# Patient Record
Sex: FEMALE | Race: WHITE | NOT HISPANIC OR LATINO | Employment: FULL TIME | URBAN - METROPOLITAN AREA
[De-identification: names, ages, dates, MRNs, and addresses within clinical notes are randomized per-mention and may not be internally consistent; named-entity substitution may affect disease eponyms.]

---

## 2017-07-05 LAB
ALT SERPL-CCNC: 16 U/L (ref 14–59)
AST SERPL-CCNC: 17 U/L (ref 0–45)
CHOLEST SERPL-MCNC: 158 MG/DL
CREAT SERPL-MCNC: 0.8 MG/DL (ref 0.6–1)
GFR SERPL CREATININE-BSD FRML MDRD: >60 ML/MIN/1.73M^2
GLUCOSE SERPL-MCNC: 80 MG/DL (ref 70–124)
HDLC SERPL-MCNC: 68 MG/DL (ref 35–999)
HEP C HIM: NORMAL
HIV 1&2 EXT: NORMAL
LDLC SERPL CALC-MCNC: 76 MG/DL (ref 0–130)
POTASSIUM SERPL-SCNC: 3.9 MMOL/L (ref 3.4–5.3)
TRIGL SERPL-MCNC: 71 MG/DL (ref 20–150)
TSH SERPL-ACNC: 10.39 MLU/L (ref 0.4–4.5)

## 2017-10-03 ENCOUNTER — TRANSFERRED RECORDS (OUTPATIENT)
Dept: HEALTH INFORMATION MANAGEMENT | Facility: CLINIC | Age: 27
End: 2017-10-03

## 2017-10-03 LAB — TSH SERPL-ACNC: 5.07 MLU/L (ref 0.4–4.5)

## 2017-10-14 ENCOUNTER — MEDICAL CORRESPONDENCE (OUTPATIENT)
Dept: HEALTH INFORMATION MANAGEMENT | Facility: CLINIC | Age: 27
End: 2017-10-14

## 2017-10-14 ENCOUNTER — TRANSFERRED RECORDS (OUTPATIENT)
Dept: HEALTH INFORMATION MANAGEMENT | Facility: CLINIC | Age: 27
End: 2017-10-14

## 2017-10-24 ENCOUNTER — TELEPHONE (OUTPATIENT)
Dept: ENDOCRINOLOGY | Facility: CLINIC | Age: 27
End: 2017-10-24

## 2017-10-24 NOTE — TELEPHONE ENCOUNTER
To schedulers: Please schedule her for lst available endocrine    Joana Agustin MD  Endocrine triage

## 2017-10-24 NOTE — TELEPHONE ENCOUNTER
----- Message from Wilber Ruff sent at 10/24/2017 10:49 AM CDT -----  Regarding: Referring to Endocrine from Dr. Britney Le   Pt being referred for possible Hasimoto's / Thyroiditis based on lab results/symptoms.    Thanks,  Wilber    Referral / Discharge Coordinator

## 2017-11-12 ASSESSMENT — ENCOUNTER SYMPTOMS
BLOOD IN STOOL: 0
INSOMNIA: 0
DIARRHEA: 0
NERVOUS/ANXIOUS: 1
SINUS PAIN: 0
ARTHRALGIAS: 0
INCREASED ENERGY: 0
DECREASED CONCENTRATION: 0
SKIN CHANGES: 0
SORE THROAT: 0
HALLUCINATIONS: 0
POOR WOUND HEALING: 0
HOT FLASHES: 0
MUSCLE WEAKNESS: 0
BOWEL INCONTINENCE: 0
VOMITING: 0
NECK MASS: 0
RECTAL PAIN: 0
SMELL DISTURBANCE: 0
HOARSE VOICE: 0
TASTE DISTURBANCE: 0
NECK PAIN: 1
NAUSEA: 0
PANIC: 0
POLYDIPSIA: 0
STIFFNESS: 0
CONSTIPATION: 1
FEVER: 0
BACK PAIN: 1
ALTERED TEMPERATURE REGULATION: 0
SINUS CONGESTION: 0
WEIGHT LOSS: 0
MYALGIAS: 1
FATIGUE: 1
DECREASED LIBIDO: 0
DECREASED APPETITE: 0
MUSCLE CRAMPS: 0
JOINT SWELLING: 0
NAIL CHANGES: 0
CHILLS: 0
BLOATING: 1
POLYPHAGIA: 0
ABDOMINAL PAIN: 1
NIGHT SWEATS: 0
HEARTBURN: 0
JAUNDICE: 0
DEPRESSION: 1
WEIGHT GAIN: 0
TROUBLE SWALLOWING: 0

## 2017-11-15 ENCOUNTER — OFFICE VISIT (OUTPATIENT)
Dept: ENDOCRINOLOGY | Facility: CLINIC | Age: 27
End: 2017-11-15

## 2017-11-15 VITALS
SYSTOLIC BLOOD PRESSURE: 106 MMHG | BODY MASS INDEX: 22.9 KG/M2 | HEIGHT: 61 IN | DIASTOLIC BLOOD PRESSURE: 68 MMHG | WEIGHT: 121.3 LBS | HEART RATE: 63 BPM

## 2017-11-15 DIAGNOSIS — E03.8 SUBCLINICAL HYPOTHYROIDISM: Primary | ICD-10-CM

## 2017-11-15 ASSESSMENT — PAIN SCALES - GENERAL: PAINLEVEL: NO PAIN (0)

## 2017-11-15 NOTE — MR AVS SNAPSHOT
"              After Visit Summary   11/15/2017    Corrina Beasley    MRN: [id number]           Patient Information     Date Of Birth          1990        Visit Information        Provider Department      11/15/2017 7:00 AM Hernesto Ortez MD [facility]        Today's Diagnoses     Subclinical hypothyroidism    -  1       Follow-ups after your visit        Who to contact     Please call your clinic at [phone] to:    Ask questions about your health    Make or cancel appointments    Discuss your medicines    Learn about your test results    Speak to your doctor   If you have compliments or concerns about an experience at your clinic, or if you wish to file a complaint, please contact [facility] Patient Relations at [phone] or email us at [email or web address]         Additional Information About Your Visit        MyChart Information     [employer] gives you secure access to your electronic health record. If you see a primary care provider, you can also send messages to your care team and make appointments. If you have questions, please call your primary care clinic.  If you do not have a primary care provider, please call [phone] and they will assist you.      [employer] is an electronic gateway that provides easy, online access to your medical records. With [employer], you can request a clinic appointment, read your test results, renew a prescription or communicate with your care team.     To access your existing account, please contact your [facility] or call [phone] for assistance.        Care EveryWhere ID     This is your Care EveryWhere ID. This could be used by other organizations to access your [place] medical records  [id number]        Your Vitals Were     Pulse Height BMI (Body Mass Index)             63 1.549 m (5' 1\") 22.92 kg/m2          Blood Pressure from Last 3 Encounters:   11/15/17 106/68    Weight " from Last 3 Encounters:   11/15/17 55 kg (121 lb 4.8 oz)              Today, you had the following     No orders found for display       Primary Care Provider Office Phone # Fax #    [facility] [phone]       [address] [place] [id number]        Equal Access to Services     MISTY SANTOS : Hadii aad ku hadasho Soomaali, waaxda luqadaha, qaybta kaalmada adeegyada, waxmayte pantojain hayaan adeiesha lamb. So [facility] [phone].    ATENCIÓN: Si habla español, tiene a gray disposición servicios gratuitos de asistencia lingüística. Llame al [phone].    We comply with applicable federal civil rights laws and [place] laws. We do not discriminate on the basis of race, color, national origin, age, disability, sex, sexual orientation, or gender identity.            Thank you!     Thank you for choosing [facility]  for your care. Our goal is always to provide you with excellent care. Hearing back from our patients is one way we can continue to improve our services. Please take a few minutes to complete the written survey that you may receive in the mail after your visit with us. Thank you!             Your Updated Medication List - Protect others around you: Learn how to safely use, store and throw away your medicines at www.disposemymeds.org.          This list is accurate as of: 11/15/17  7:37 AM.  Always use your most recent med list.                   Brand Name Dispense Instructions for use Diagnosis    CALCIUM/VITAMIN D3/ADULT GUMMY PO           HM MULTIVITAMIN ADULT GUMMY Chew

## 2017-11-15 NOTE — LETTER
11/15/2017       RE: Corrina Beasley  [address]  [place] [address]     Dear Colleague,    Thank you for referring your patient, Corrina Beasley, to the [facility] ENDOCR[facility] at [facility]. Please see a copy of my visit note below.    Endocrinology Clinic Visit 11/15/2017    NAME:  Corrina Beasley  PCP:  Mimi Bueno Md  MRN:  [id number]  Reason for Consult:  Subclinical hypothyroidism  Requesting Provider:  [facility]    Chief Complaint     Chief Complaint   Patient presents with     Consult     Hashimoto's        History of Present Illness     Corrina Beasley is a 27 year old female who is seen in clinic for management of subclinical hypothyroidism.    Patient had a routine physical examination at [facility] in July 2017. Thyroid labs were checked due to a family history of thyroid disease. Patient was not symptomatic then. Labs showed TSH 10.39, free T4 0.9. Patient was advised to recheck labs in 3 months.      Labs drawn on 10/4/2017 showed:  Thyroid peroxidase antibodies: 448 IU/mL (normal less than 9)  TSH: 5.07 (normal 0.4-4.5)  Free T4: 1.0 ng/dL (normal 0.8-1.8)  CBC, BMP, and cholesterol profile all normal.    Outside records from [facility] were reviewed in the visit today    The patient reports that she has been noticing some hair thinning in the past year or 2. It is mild. She has been slightly more fatigue this past year but she thinks it may be due to increased schoolwork in grad school. She has noticed occasional constipation but only about 25% of the time. Her weight is stable.    The patient denies any slowed thinking, feeling slow, dry skin, feeling down and feeling depressed.    No recent history of febrile illness with neck pain or sore throat.     Localized symptoms: there is no throat swelling, trouble swallowing, no SOB when lying flat, and no voice changes.     Family history of thyroid disease: Yes: one  maternal aunt and a paternal aunt both had Graves disease. Paternal grandmother had hypothyroidism. Possibly another paternal aunt with hypothyroidism. Paternal aunt with lupus (same one who has Graves disease).   Family history of thyroid cancer: No  Personal history of high-dose radiation especially in childhood: No    Problem List     There is no problem list on file for this patient.       Medications     Current Outpatient Prescriptions   Medication     Calcium-Phosphorus-Vitamin D (CALCIUM/VITAMIN D3/ADULT GUMMY PO)     Multiple Vitamins-Minerals (HM MULTIVITAMIN ADULT GUMMY) CHEW     No current facility-administered medications for this visit.         Allergies     No Known Allergies    Medical / Surgical History     No past medical history on file.  No past surgical history on file.    Social History     Social History     Social History     Marital status: Single     Spouse name: N/A     Number of children: N/A     Years of education: N/A     Occupational History     Not on file.     Social History Main Topics     Smoking status: Not on file     Smokeless tobacco: Not on file     Alcohol use Not on file     Drug use: Not on file     Sexual activity: Not on file     Other Topics Concern     Not on file     Social History Narrative       Family History     No family history on file.    ROS     Constitutional: no fevers, chills, night sweats. No weight loss/gain. Mild fatigue. Good appetite  Eyes: no vision changes, no eye redness, no diplopia  Ears, Nose, mouth, throat: no hearing changes, no tinnitus, no rhinorrhea, no nasal congestion, no anosmia  Cardiovascular: no chest pain, no orthopnea or PND, no edema, no palpitations  Respiratory: no dyspnea, no cough, no sputum, no wheezing  Gastrointestinal: no nausea, no vomiting, no abdominal pain, no diarrhea, occasional constipation  Genitourinary: no dysuria, no frequency, no urgency, no nocturia  Musculoskeletal: no joint pains, no back pain, no cramps, no  "fractures  Skin: no rash, no itching, no dryness, no ulcers, no hair loss, no nail changes  Neurological:no headaches, no weakness, no numbness, no tingling, no tremors, no difficulty sleeping, no snoring, no AM sleepiness  Psychiatric: no anxiety, no sadness  Hematologic/lymphatic: no easy bruising, no bleeding, no palor    Physical Exam   /68  Pulse 63  Ht 1.549 m (5' 1\")  Wt 55 kg (121 lb 4.8 oz)  BMI 22.92 kg/m2     General: Comfortable, no obvious distress, normal body habitus  Eyes: Sclera anicteric, moist conjunctiva, no lid lag, no exophthalmos  HENT: Atraumatic, oropharynx clear, moist mucous membranes with no mucosal ulcerations  Neck: Trachea midline, supple. Thyroid: Thyroid is normal in size and texture  CV: Regular rhythm, normal rate. No murmurs auscultated  Resp: Clear to auscultation bilaterally, good effort  Abdomen:  Soft, non tender, non distended. Bowel sounds heard. No organomegaly. No striae  Skin: No rashes, lesions, or subcutaneous nodules.   Psych: Alert and oriented x 3. Appropriate affect, good insight  Extremities: No peripheral edema  Musculoskeletal: Appropriate muscle bulk and strength  Lymphatic: No cervical lymphadenopathy  Neuro: Moves all four extremities. No focal deficits on limited exam. Gait normal. No resting tremor, deep tendon reflexes with normal relaxation phase.     Labs/Imaging     Pertinent Labs were reviewed and updated in [facility].  Radiology Results were  reviewed and updated in EPIC.    I personally reviewed the patient's outside records from [place]. Summary of pertinent findings in [facility].    Impression / Plan     1. Subclinical Hypothyroidism:    We discussed the pathogenesis of hypothyroidism. In most cases it is an autoimmune process that results in lymphocytic thyroiditis, also knows as Hashimoto's thyroiditis, which gradually destroys the gland's ability to produce and secrete thyroid hormone. There is a genetic predisposition in most cases, and possibly " an environmental trigger. There is no treatment that would reverse/stop the autoimmune process. Treatment is aimed at replacing thyroid hormone, with levothyroxine, which is identical to our own T4. The dose is adjusted to target a normal TSH, which is a signal from the pituitary gland.   Patients with hypothyroidism usually have positive thyroid antibodies, namely thyroid peroxidase antibody (anti-TPO) and anti-thyroglobulin antibody.     As far as guidelines for starting treatment, we discussed the current guidelines. I explained to her the pros and cons of starting levothyroxine replacement. Pros including possible symptom relief, but her symptoms are mild, also she has a positive TPO so this may get worse over time. Cons including cost of treatments, and possible lack of any clinical response.     After discussing at length all of the above the patient decided to delay the decision for treatment. This is mostly due to the fact that her TSH changed from 10 to 5 in 3 months, and she would like to check again in 3 months status he where her TSH settles. I did explain to her that it is not very predictable as to the course of Hashimoto's thyroiditis. Her TSH might get worse very quickly or it may just linger in the mildly elevated range for years or any scenario in between.     Plan:  - Will recheck TSH again around January 15, 2018 which is about 3 months from her previous tests  - She will have the lab done at [place] where she had it before. I gave her a lab slip today  - we Will communicate via [employer] after the lab tests are back  - No follow up appointment was made today pending the next set of labs      Hernesto Ortez MD  Endocrinology, Diabetes and Metabolism  [facility]

## 2017-11-15 NOTE — PROGRESS NOTES
Endocrinology Clinic Visit 11/15/2017    NAME:  Corrina Beasley  PCP:  [facility]  MRN:  [id number]  Reason for Consult:  Subclinical hypothyroidism  Requesting Provider:  [facility]    Chief Complaint     Chief Complaint   Patient presents with     Consult     Hashimoto's        History of Present Illness     Corrina Beasley is a 27 year old female who is seen in clinic for management of subclinical hypothyroidism.    Patient had a routine physical examination at [facility] in July 2017. Thyroid labs were checked due to a family history of thyroid disease. Patient was not symptomatic then. Labs showed TSH 10.39, free T4 0.9. Patient was advised to recheck labs in 3 months.      Labs drawn on 10/4/2017 showed:  Thyroid peroxidase antibodies: 448 IU/mL (normal less than 9)  TSH: 5.07 (normal 0.4-4.5)  Free T4: 1.0 ng/dL (normal 0.8-1.8)  CBC, BMP, and cholesterol profile all normal.    Outside records from [facility] were reviewed in the visit today    The patient reports that she has been noticing some hair thinning in the past year or 2. It is mild. She has been slightly more fatigue this past year but she thinks it may be due to increased schoolwork in grad school. She has noticed occasional constipation but only about 25% of the time. Her weight is stable.    The patient denies any slowed thinking, feeling slow, dry skin, feeling down and feeling depressed.    No recent history of febrile illness with neck pain or sore throat.     Localized symptoms: there is no throat swelling, trouble swallowing, no SOB when lying flat, and no voice changes.     Family history of thyroid disease: Yes: one maternal aunt and a paternal aunt both had Graves disease. Paternal grandmother had hypothyroidism. Possibly another paternal aunt with hypothyroidism. Paternal aunt with lupus (same one who has Graves disease).   Family history of thyroid cancer: No  Personal history of high-dose  radiation especially in childhood: No    Problem List     There is no problem list on file for this patient.       Medications     Current Outpatient Prescriptions   Medication     Calcium-Phosphorus-Vitamin D (CALCIUM/VITAMIN D3/ADULT GUMMY PO)     Multiple Vitamins-Minerals (HM MULTIVITAMIN ADULT GUMMY) CHEW     No current facility-administered medications for this visit.         Allergies     No Known Allergies    Medical / Surgical History     No past medical history on file.  No past surgical history on file.    Social History     Social History     Social History     Marital status: Single     Spouse name: N/A     Number of children: N/A     Years of education: N/A     Occupational History     Not on file.     Social History Main Topics     Smoking status: Not on file     Smokeless tobacco: Not on file     Alcohol use Not on file     Drug use: Not on file     Sexual activity: Not on file     Other Topics Concern     Not on file     Social History Narrative       Family History     No family history on file.    ROS     Constitutional: no fevers, chills, night sweats. No weight loss/gain. Mild fatigue. Good appetite  Eyes: no vision changes, no eye redness, no diplopia  Ears, Nose, mouth, throat: no hearing changes, no tinnitus, no rhinorrhea, no nasal congestion, no anosmia  Cardiovascular: no chest pain, no orthopnea or PND, no edema, no palpitations  Respiratory: no dyspnea, no cough, no sputum, no wheezing  Gastrointestinal: no nausea, no vomiting, no abdominal pain, no diarrhea, occasional constipation  Genitourinary: no dysuria, no frequency, no urgency, no nocturia  Musculoskeletal: no joint pains, no back pain, no cramps, no fractures  Skin: no rash, no itching, no dryness, no ulcers, no hair loss, no nail changes  Neurological:no headaches, no weakness, no numbness, no tingling, no tremors, no difficulty sleeping, no snoring, no AM sleepiness  Psychiatric: no anxiety, no sadness  Hematologic/lymphatic:  "no easy bruising, no bleeding, no palor    Physical Exam   /68  Pulse 63  Ht 1.549 m (5' 1\")  Wt 55 kg (121 lb 4.8 oz)  BMI 22.92 kg/m2     General: Comfortable, no obvious distress, normal body habitus  Eyes: Sclera anicteric, moist conjunctiva, no lid lag, no exophthalmos  HENT: Atraumatic, oropharynx clear, moist mucous membranes with no mucosal ulcerations  Neck: Trachea midline, supple. Thyroid: Thyroid is normal in size and texture  CV: Regular rhythm, normal rate. No murmurs auscultated  Resp: Clear to auscultation bilaterally, good effort  Abdomen:  Soft, non tender, non distended. Bowel sounds heard. No organomegaly. No striae  Skin: No rashes, lesions, or subcutaneous nodules.   Psych: Alert and oriented x 3. Appropriate affect, good insight  Extremities: No peripheral edema  Musculoskeletal: Appropriate muscle bulk and strength  Lymphatic: No cervical lymphadenopathy  Neuro: Moves all four extremities. No focal deficits on limited exam. Gait normal. No resting tremor, deep tendon reflexes with normal relaxation phase.     Labs/Imaging     Pertinent Labs were [facility].  Radiology Results were  reviewed and updated in EPIC.    I personally reviewed the patient's outside records from [place]. Summary of pertinent findings in [facility].    Impression / Plan     1. Subclinical Hypothyroidism:    We discussed the pathogenesis of hypothyroidism. In most cases it is an autoimmune process that results in lymphocytic thyroiditis, also knows as Hashimoto's thyroiditis, which gradually destroys the gland's ability to produce and secrete thyroid hormone. There is a genetic predisposition in most cases, and possibly an environmental trigger. There is no treatment that would reverse/stop the autoimmune process. Treatment is aimed at replacing thyroid hormone, with levothyroxine, which is identical to our own T4. The dose is adjusted to target a normal TSH, which is a signal from the pituitary " gland.   Patients with hypothyroidism usually have positive thyroid antibodies, namely thyroid peroxidase antibody (anti-TPO) and anti-thyroglobulin antibody.     As far as guidelines for starting treatment, we discussed the current guidelines. I explained to her the pros and cons of starting levothyroxine replacement. Pros including possible symptom relief, but her symptoms are mild, also she has a positive TPO so this may get worse over time. Cons including cost of treatments, and possible lack of any clinical response.     After discussing at length all of the above the patient decided to delay the decision for treatment. This is mostly due to the fact that her TSH changed from 10 to 5 in 3 months, and she would like to check again in 3 months status he where her TSH settles. I did explain to her that it is not very predictable as to the course of Hashimoto's thyroiditis. Her TSH might get worse very quickly or it may just linger in the mildly elevated range for years or any scenario in between.     Plan:  - Will recheck TSH again around January 15, 2018 which is about 3 months from her previous tests  - She will have the lab done at [place] [facility] where she had it before. I gave her a lab slip today  - we Will communicate via [employer] after the lab tests are back  - No follow up appointment was made today pending the next set of labs      Hernesto Ortez MD  Endocrinology, Diabetes and Metabolism  [facility]

## 2017-11-18 ENCOUNTER — HEALTH MAINTENANCE LETTER (OUTPATIENT)
Age: 27
End: 2017-11-18

## 2018-05-15 ENCOUNTER — MYC MEDICAL ADVICE (OUTPATIENT)
Dept: ENDOCRINOLOGY | Facility: CLINIC | Age: 28
End: 2018-05-15

## 2018-05-15 DIAGNOSIS — E03.9 ACQUIRED HYPOTHYROIDISM: Primary | ICD-10-CM

## 2018-05-18 DIAGNOSIS — E03.8 SUBCLINICAL HYPOTHYROIDISM: ICD-10-CM

## 2018-05-18 LAB
T4 FREE SERPL-MCNC: 0.73 NG/DL (ref 0.76–1.46)
TSH SERPL DL<=0.005 MIU/L-ACNC: 10.15 MU/L (ref 0.4–4)

## 2018-05-21 RX ORDER — LEVOTHYROXINE SODIUM 75 UG/1
75 TABLET ORAL DAILY
Qty: 90 TABLET | Refills: 1 | Status: SHIPPED | OUTPATIENT
Start: 2018-05-21 | End: 2018-07-18

## 2018-07-06 DIAGNOSIS — E03.9 ACQUIRED HYPOTHYROIDISM: ICD-10-CM

## 2018-07-06 LAB — TSH SERPL DL<=0.005 MIU/L-ACNC: 0.97 MU/L (ref 0.4–4)

## 2018-07-18 DIAGNOSIS — E03.9 ACQUIRED HYPOTHYROIDISM: ICD-10-CM

## 2018-07-18 RX ORDER — LEVOTHYROXINE SODIUM 75 UG/1
75 TABLET ORAL DAILY
Qty: 90 TABLET | Refills: 3 | Status: SHIPPED | OUTPATIENT
Start: 2018-07-18 | End: 2019-01-16

## 2018-08-16 ENCOUNTER — TRANSFERRED RECORDS (OUTPATIENT)
Dept: HEALTH INFORMATION MANAGEMENT | Facility: CLINIC | Age: 28
End: 2018-08-16

## 2018-08-20 ENCOUNTER — MYC MEDICAL ADVICE (OUTPATIENT)
Dept: ENDOCRINOLOGY | Facility: CLINIC | Age: 28
End: 2018-08-20

## 2018-08-22 DIAGNOSIS — E03.8 OTHER SPECIFIED HYPOTHYROIDISM: Primary | ICD-10-CM

## 2019-01-16 ENCOUNTER — OFFICE VISIT (OUTPATIENT)
Dept: ENDOCRINOLOGY | Facility: CLINIC | Age: 29
End: 2019-01-16
Payer: COMMERCIAL

## 2019-01-16 VITALS
BODY MASS INDEX: 23.03 KG/M2 | WEIGHT: 122 LBS | DIASTOLIC BLOOD PRESSURE: 73 MMHG | HEIGHT: 61 IN | HEART RATE: 69 BPM | SYSTOLIC BLOOD PRESSURE: 123 MMHG

## 2019-01-16 DIAGNOSIS — E03.8 OTHER SPECIFIED HYPOTHYROIDISM: ICD-10-CM

## 2019-01-16 DIAGNOSIS — E03.9 ACQUIRED HYPOTHYROIDISM: Primary | ICD-10-CM

## 2019-01-16 DIAGNOSIS — E03.9 ACQUIRED HYPOTHYROIDISM: ICD-10-CM

## 2019-01-16 LAB
T4 FREE SERPL-MCNC: NORMAL NG/DL (ref 0.76–1.46)
TSH SERPL DL<=0.005 MIU/L-ACNC: 3.77 MU/L (ref 0.4–4)

## 2019-01-16 ASSESSMENT — MIFFLIN-ST. JEOR: SCORE: 1215.77

## 2019-01-16 ASSESSMENT — PAIN SCALES - GENERAL: PAINLEVEL: NO PAIN (0)

## 2019-01-16 NOTE — PROGRESS NOTES
Endocrinology Clinic Visit 01/16/19  NAME:  Corrina Beasley  PCP:  [facility]  MRN:  [id number]      Chief Complaint     Chief Complaint   Patient presents with     RECHECK     hashimoto's thyroiditis       History of Present Illness     Corrina Beasley is a 27 year old female who is seen in clinic for management of subclinical hypothyroidism.    Patient had a routine physical examination at [facility] in July 2017. Thyroid labs were checked due to a family history of thyroid disease. Patient was not symptomatic then. Labs showed TSH 10.39, free T4 0.9. Patient was advised to recheck labs in 3 months.  Lab recheck in October 2017 showed elevated thyroid peroxidase antibody, with a TSH of 5.07 (normal 0.4-4.5) and a free T4 of 1.0 ng/dL (normal 0.8-1.8).  I saw the patient in November 2017 and she elected to wait and recheck her TSH in a few months.  She had it checked in May 2018 and her TSH was 10.15, with a low free T4 of 0.73.  At that time I advised starting levothyroxine replacement at a dose of 75 mcg daily.  Follow-up lab on 7/6/2018 showed a normal TSH of 0.97.  She was continued on the same dose of levothyroxine, which is the same dose she is still taking right now.  She has not had any repeat thyroid testing.  She reports taking her levothyroxine every morning on an empty stomach half an hour before any other food or drinks.    As far as symptoms, she has noticed that she feels less cold since she started her levothyroxine replacement.  She does report some fatigue, but there has been a lot happening in the past year including starting a new job.  Weight is stable, No change in BMs, No issues with sleep, No tremors, No palpitations, No change in menstrual cycles. It is regular.    No recent history of febrile illness with neck pain or sore throat.     Localized symptoms: there is no throat swelling, trouble swallowing, no SOB when lying flat, and no voice changes.     Family history of  thyroid disease: Yes: one maternal aunt and a paternal aunt both had Graves disease. Paternal grandmother had hypothyroidism. Possibly another paternal aunt with hypothyroidism. Paternal aunt with lupus (same one who has Graves disease).   Family history of thyroid cancer: No  Personal history of high-dose radiation especially in childhood: No    Problem List     Patient Active Problem List   Diagnosis     Acquired hypothyroidism        Medications     Current Outpatient Medications   Medication     Calcium-Phosphorus-Vitamin D (CALCIUM/VITAMIN D3/ADULT GUMMY PO)     levothyroxine (SYNTHROID/LEVOTHROID) 75 MCG tablet     Multiple Vitamins-Minerals (HM MULTIVITAMIN ADULT GUMMY) CHEW     No current facility-administered medications for this visit.         Allergies     No Known Allergies    Medical / Surgical History     No past medical history on file.  No past surgical history on file.    Social History     Social History     Socioeconomic History     Marital status: Single     Spouse name: Not on file     Number of children: Not on file     Years of education: Not on file     Highest education level: Not on file   Social Needs     Financial resource strain: Not on file     Food insecurity - worry: Not on file     Food insecurity - inability: Not on file     Transportation needs - medical: Not on file     Transportation needs - non-medical: Not on file   Occupational History     Not on file   Tobacco Use     Smoking status: Not on file   Substance and Sexual Activity     Alcohol use: Not on file     Drug use: Not on file     Sexual activity: Not on file   Other Topics Concern     Not on file   Social History Narrative     Not on file       Family History     Multiple family members with thyroid disease.    ROS     Constitutional: no fevers, chills, night sweats. Mild fatigue. Good appetite  Eyes: no vision changes, no eye redness, no diplopia  Ears, Nose, mouth, throat: no hearing changes, no tinnitus, no  "rhinorrhea, no nasal congestion  Cardiovascular: no chest pain, no orthopnea or PND, no edema, no palpitations  Respiratory: no dyspnea, no cough, no sputum, no wheezing  Gastrointestinal: no nausea, no vomiting, no abdominal pain, no diarrhea, occasional constipation  Genitourinary: no dysuria, no frequency, no urgency, no nocturia  Musculoskeletal: no joint pains, no back pain, no cramps, no fractures  Skin: no rash, no itching, no dryness, no ulcers, no hair loss, no nail changes  Neurological:no headaches, no weakness, no numbness, no tingling, no tremors, no difficulty sleeping  Psychiatric: no anxiety, no sadness  Hematologic/lymphatic: no easy bruising, no bleeding, no palor    Physical Exam   /73   Pulse 69   Ht 1.549 m (5' 1\")   Wt 55.3 kg (122 lb)   BMI 23.05 kg/m       General: Comfortable, no obvious distress, normal body habitus  Eyes: Sclera anicteric, moist conjunctiva, no lid lag, no exophthalmos  HENT: Atraumatic, oropharynx clear, moist mucous membranes with no mucosal ulcerations  Neck: Trachea midline, supple. Thyroid: Thyroid is normal in size and texture  CV: Regular rhythm, normal rate. No murmurs auscultated  Resp: Clear to auscultation bilaterally, good effort  Abdomen:  Soft, non tender, non distended. Bowel sounds heard. No organomegaly. No striae  Skin: No rashes, lesions, or subcutaneous nodules.   Psych: Alert and oriented x 3. Appropriate affect, good insight  Extremities: No peripheral edema  Musculoskeletal: Appropriate muscle bulk and strength  Lymphatic: No cervical lymphadenopathy  Neuro: Moves all four extremities. No focal deficits on limited exam. Gait normal. No resting tremor, deep tendon reflexes with normal relaxation phase.     Labs/Imaging     Pertinent Labs were reviewed and updated in [facility].  Radiology Results were  reviewed and updated in EPIC.    I personally reviewed the patient's outside records from [place]. Summary of pertinent findings in " HPI.    Impression / Plan     1. Hypothyroidism due to Hashimoto's thyroiditis.:    She was started on thyroid hormone replacement May 2018.    Today we discussed the following:  -The need for annual monitoring of her thyroid status.  TSH checks may need to be done more frequently depending on her results and dose adjustments.  -We will need to get a TSH today, and if it is normal we will recheck again in a year.  If it is abnormal we will adjust the dose and recheck in 2 months.  -The patient was advised to contact us in the next year if she notices any changes in her symptoms that could be suggestive of hyper or hypothyroidism.  At that point we would want to check a TSH.  -Counseling regarding pregnancy planning was given today.  Cervically advised for a TSH goal of 0.4-2.5 before pregnancy and during the first trimester.  I also advised empiric dose increases in levothyroxine by adding an extra pill on Saturdays and Sundays if she finds out she is pregnant.  She is advised to call us as soon as she gets a pregnancy test.  She is not planning a pregnancy anytime soon but counseling was given in any case given that she is in childbearing age.    Otherwise she will follow-up with me annually, considering that she does not have any other primary provider.  She was encouraged to establish with a primary care provider in the future and at that point she does not need to see endocrinology.    Follow up: One year    TIME: I spent a total of 25 minutes face-to-face with Corrina Beasley during today's office visit.  Over 50% of this time was spent counseling the patient and/or coordinating care regarding counseling as per above.  See note for details.    Hernesto Ortez MD  Endocrinology, Diabetes and Metabolism  [facility]

## 2019-01-16 NOTE — LETTER
1/16/2019       RE: Corrina Beasley  [address]  [facility] [address]     Dear Colleague,    Thank you for referring your patient, Corrina Beasley, to the [facility] ENDOCRINOLOGY at [facility]. Please see a copy of my visit note below.    Endocrinology Clinic Visit 01/16/19  NAME:  Corrina Beasley  PCP:  [facility]  MRN:  [id number]      Chief Complaint     Chief Complaint   Patient presents with     RECHECK     hashimoto's thyroiditis       History of Present Illness     Corrina Beasley is a 27 year old female who is seen in clinic for management of subclinical hypothyroidism.    Patient had a routine physical examination at [facility] in July 2017. Thyroid labs were checked due to a family history of thyroid disease. Patient was not symptomatic then. Labs showed TSH 10.39, free T4 0.9. Patient was advised to recheck labs in 3 months.  Lab recheck in October 2017 showed elevated thyroid peroxidase antibody, with a TSH of 5.07 (normal 0.4-4.5) and a free T4 of 1.0 ng/dL (normal 0.8-1.8).  I saw the patient in November 2017 and she elected to wait and recheck her TSH in a few months.  She had it checked in May 2018 and her TSH was 10.15, with a low free T4 of 0.73.  At that time I advised starting levothyroxine replacement at a dose of 75 mcg daily.  Follow-up lab on 7/6/2018 showed a normal TSH of 0.97.  She was continued on the same dose of levothyroxine, which is the same dose she is still taking right now.  She has not had any repeat thyroid testing.  She reports taking her levothyroxine every morning on an empty stomach half an hour before any other food or drinks.    As far as symptoms, she has noticed that she feels less cold since she started her levothyroxine replacement.  She does report some fatigue, but there has been a lot happening in the past year including starting a new job.  Weight is stable, No change in BMs, No issues with sleep, No  tremors, No palpitations, No change in menstrual cycles. It is regular.    No recent history of febrile illness with neck pain or sore throat.     Localized symptoms: there is no throat swelling, trouble swallowing, no SOB when lying flat, and no voice changes.     Family history of thyroid disease: Yes: one maternal aunt and a paternal aunt both had Graves disease. Paternal grandmother had hypothyroidism. Possibly another paternal aunt with hypothyroidism. Paternal aunt with lupus (same one who has Graves disease).   Family history of thyroid cancer: No  Personal history of high-dose radiation especially in childhood: No    Problem List     Patient Active Problem List   Diagnosis     Acquired hypothyroidism        Medications     Current Outpatient Medications   Medication     Calcium-Phosphorus-Vitamin D (CALCIUM/VITAMIN D3/ADULT GUMMY PO)     levothyroxine (SYNTHROID/LEVOTHROID) 75 MCG tablet     Multiple Vitamins-Minerals (HM MULTIVITAMIN ADULT GUMMY) CHEW     No current facility-administered medications for this visit.         Allergies     No Known Allergies    Medical / Surgical History     No past medical history on file.  No past surgical history on file.    Social History     Social History     Socioeconomic History     Marital status: Single     Spouse name: Not on file     Number of children: Not on file     Years of education: Not on file     Highest education level: Not on file   Social Needs     Financial resource strain: Not on file     Food insecurity - worry: Not on file     Food insecurity - inability: Not on file     Transportation needs - medical: Not on file     Transportation needs - non-medical: Not on file   Occupational History     Not on file   Tobacco Use     Smoking status: Not on file   Substance and Sexual Activity     Alcohol use: Not on file     Drug use: Not on file     Sexual activity: Not on file   Other Topics Concern     Not on file   Social History Narrative     Not on file  "      Family History     Multiple family members with thyroid disease.    ROS     Constitutional: no fevers, chills, night sweats. Mild fatigue. Good appetite  Eyes: no vision changes, no eye redness, no diplopia  Ears, Nose, mouth, throat: no hearing changes, no tinnitus, no rhinorrhea, no nasal congestion  Cardiovascular: no chest pain, no orthopnea or PND, no edema, no palpitations  Respiratory: no dyspnea, no cough, no sputum, no wheezing  Gastrointestinal: no nausea, no vomiting, no abdominal pain, no diarrhea, occasional constipation  Genitourinary: no dysuria, no frequency, no urgency, no nocturia  Musculoskeletal: no joint pains, no back pain, no cramps, no fractures  Skin: no rash, no itching, no dryness, no ulcers, no hair loss, no nail changes  Neurological:no headaches, no weakness, no numbness, no tingling, no tremors, no difficulty sleeping  Psychiatric: no anxiety, no sadness  Hematologic/lymphatic: no easy bruising, no bleeding, no palor    Physical Exam   /73   Pulse 69   Ht 1.549 m (5' 1\")   Wt 55.3 kg (122 lb)   BMI 23.05 kg/m        General: Comfortable, no obvious distress, normal body habitus  Eyes: Sclera anicteric, moist conjunctiva, no lid lag, no exophthalmos  HENT: Atraumatic, oropharynx clear, moist mucous membranes with no mucosal ulcerations  Neck: Trachea midline, supple. Thyroid: Thyroid is normal in size and texture  CV: Regular rhythm, normal rate. No murmurs auscultated  Resp: Clear to auscultation bilaterally, good effort  Abdomen:  Soft, non tender, non distended. Bowel sounds heard. No organomegaly. No striae  Skin: No rashes, lesions, or subcutaneous nodules.   Psych: Alert and oriented x 3. Appropriate affect, good insight  Extremities: No peripheral edema  Musculoskeletal: Appropriate muscle bulk and strength  Lymphatic: No cervical lymphadenopathy  Neuro: Moves all four extremities. No focal deficits on limited exam. Gait normal. No resting tremor, deep tendon " reflexes with normal relaxation phase.     Labs/Imaging     Pertinent Labs were reviewed and updated in [facility].  Radiology Results were  reviewed and updated in EPIC.    I personally reviewed the patient's outside records from [place]. Summary of pertinent findings in [facility].    Impression / Plan     1. Hypothyroidism due to Hashimoto's thyroiditis.:    She was started on thyroid hormone replacement May 2018.    Today we discussed the following:  -The need for annual monitoring of her thyroid status.  TSH checks may need to be done more frequently depending on her results and dose adjustments.  -We will need to get a TSH today, and if it is normal we will recheck again in a year.  If it is abnormal we will adjust the dose and recheck in 2 months.  -The patient was advised to contact us in the next year if she notices any changes in her symptoms that could be suggestive of hyper or hypothyroidism.  At that point we would want to check a TSH.  -Counseling regarding pregnancy planning was given today.  Cervically advised for a TSH goal of 0.4-2.5 before pregnancy and during the first trimester.  I also advised empiric dose increases in levothyroxine by adding an extra pill on Saturdays and Sundays if she finds out she is pregnant.  She is advised to call us as soon as she gets a pregnancy test.  She is not planning a pregnancy anytime soon but counseling was given in any case given that she is in childbearing age.    Otherwise she will follow-up with me annually, considering that she does not have any other primary provider.  She was encouraged to establish with a primary care provider in the future and at that point she does not need to see endocrinology.    Follow up: One year    TIME: I spent a total of 25 minutes face-to-face with Corrina Beasley during today's office visit.  Over 50% of this time was spent counseling the patient and/or coordinating care regarding counseling as per above.  See note for  details.      Hernesto Ortez MD

## 2019-01-16 NOTE — NURSING NOTE
Chief Complaint   Patient presents with     RECHECK     hashimoto's thyroiditis     Maggie Alberto CMA

## 2019-07-16 DIAGNOSIS — E03.9 ACQUIRED HYPOTHYROIDISM: ICD-10-CM

## 2019-07-16 LAB — TSH SERPL DL<=0.005 MIU/L-ACNC: 1.53 MU/L (ref 0.4–4)

## 2019-07-17 ENCOUNTER — MYC MEDICAL ADVICE (OUTPATIENT)
Dept: ENDOCRINOLOGY | Facility: CLINIC | Age: 29
End: 2019-07-17

## 2019-10-25 DIAGNOSIS — E03.9 ACQUIRED HYPOTHYROIDISM: ICD-10-CM

## 2019-10-26 RX ORDER — LEVOTHYROXINE SODIUM 88 UG/1
88 TABLET ORAL DAILY
Qty: 90 TABLET | Refills: 3 | OUTPATIENT
Start: 2019-10-26

## 2019-11-03 ENCOUNTER — HEALTH MAINTENANCE LETTER (OUTPATIENT)
Age: 29
End: 2019-11-03

## 2019-12-12 ENCOUNTER — OFFICE VISIT (OUTPATIENT)
Dept: OBGYN | Facility: CLINIC | Age: 29
End: 2019-12-12
Payer: COMMERCIAL

## 2019-12-12 VITALS
DIASTOLIC BLOOD PRESSURE: 65 MMHG | SYSTOLIC BLOOD PRESSURE: 112 MMHG | WEIGHT: 123 LBS | OXYGEN SATURATION: 95 % | HEART RATE: 71 BPM | BODY MASS INDEX: 23.24 KG/M2

## 2019-12-12 DIAGNOSIS — Z12.4 SCREENING FOR MALIGNANT NEOPLASM OF CERVIX: ICD-10-CM

## 2019-12-12 DIAGNOSIS — Z11.3 SCREENING EXAMINATION FOR SEXUALLY TRANSMITTED DISEASE: ICD-10-CM

## 2019-12-12 DIAGNOSIS — Z11.3 SCREEN FOR STD (SEXUALLY TRANSMITTED DISEASE): ICD-10-CM

## 2019-12-12 DIAGNOSIS — B37.31 YEAST INFECTION OF THE VAGINA: ICD-10-CM

## 2019-12-12 DIAGNOSIS — N89.8 VAGINAL ITCHING: Primary | ICD-10-CM

## 2019-12-12 LAB
SPECIMEN SOURCE: ABNORMAL
WET PREP SPEC: ABNORMAL

## 2019-12-12 PROCEDURE — 87491 CHLMYD TRACH DNA AMP PROBE: CPT | Performed by: OBSTETRICS & GYNECOLOGY

## 2019-12-12 PROCEDURE — 99203 OFFICE O/P NEW LOW 30 MIN: CPT | Performed by: OBSTETRICS & GYNECOLOGY

## 2019-12-12 PROCEDURE — 87210 SMEAR WET MOUNT SALINE/INK: CPT | Performed by: OBSTETRICS & GYNECOLOGY

## 2019-12-12 PROCEDURE — 87624 HPV HI-RISK TYP POOLED RSLT: CPT | Performed by: OBSTETRICS & GYNECOLOGY

## 2019-12-12 PROCEDURE — 87591 N.GONORRHOEAE DNA AMP PROB: CPT | Performed by: OBSTETRICS & GYNECOLOGY

## 2019-12-12 PROCEDURE — G0145 SCR C/V CYTO,THINLAYER,RESCR: HCPCS | Performed by: OBSTETRICS & GYNECOLOGY

## 2019-12-12 RX ORDER — FLUCONAZOLE 150 MG/1
150 TABLET ORAL ONCE
Qty: 1 TABLET | Refills: 0 | Status: SHIPPED | OUTPATIENT
Start: 2019-12-12 | End: 2019-12-17

## 2019-12-12 NOTE — PROGRESS NOTES
SUBJECTIVE:  Corrina Beasley is a 29 year old female who presents to evaluate vulvar itching.  She is seen no abnormal discharge, but over the past week has had vulvar itching, vulva is sensitive to touch.  She had a recent menstrual period, the symptoms seem to be decreasing.  She has a history of an abdominal myomectomy 4 years ago, had been experiencing pelvic pain prior to the procedure, still has some degree of pain but feels she can cope with it.  She used a ParaGard for 5 years, now is not using contraception.  She requests STD testing as ordered.    Past medical, surgical, family, and social history have been noted.     ROS:  See above re GI/.  No fevers, chills.     OBJECTIVE: /65   Pulse 71   Wt 55.8 kg (123 lb)   SpO2 95%   BMI 23.24 kg/m       Pelvic exam:  External genitalia appeared normal without lesion.  Urethral meatus, urethra, bladder, perineum, and anus appeared normal. Cervix and vagina appeared normal, without lesion.  Bimanual exam revealed a normal sized non-tender uterus, with no adnexal masses.  Rectovaginal deferred.       Results for orders placed or performed in visit on 12/12/19   Wet prep     Status: Abnormal   Result Value Ref Range    Specimen Description Vagina     Wet Prep No Trichomonas seen     Wet Prep No clue cells seen     Wet Prep Few  Yeast seen   (A)     Wet Prep Few  WBC'S seen         GC/CT pending.  Pap pending.     ASSESSMENT: Vulvar pruritus, improving.  Yeast seen on wet prep.    PLAN: Will treat with Diflucan x 1.  RTC if symptoms recur.

## 2019-12-13 LAB
C TRACH DNA SPEC QL NAA+PROBE: NEGATIVE
N GONORRHOEA DNA SPEC QL NAA+PROBE: NEGATIVE
SPECIMEN SOURCE: NORMAL
SPECIMEN SOURCE: NORMAL

## 2019-12-16 LAB
COPATH REPORT: NORMAL
PAP: NORMAL

## 2019-12-17 ENCOUNTER — ANCILLARY PROCEDURE (OUTPATIENT)
Dept: GENERAL RADIOLOGY | Facility: CLINIC | Age: 29
End: 2019-12-17
Attending: NURSE PRACTITIONER
Payer: OTHER MISCELLANEOUS

## 2019-12-17 ENCOUNTER — OFFICE VISIT (OUTPATIENT)
Dept: FAMILY MEDICINE | Facility: CLINIC | Age: 29
End: 2019-12-17
Payer: OTHER MISCELLANEOUS

## 2019-12-17 VITALS
TEMPERATURE: 98.4 F | HEIGHT: 61 IN | WEIGHT: 124 LBS | RESPIRATION RATE: 18 BRPM | BODY MASS INDEX: 23.41 KG/M2 | OXYGEN SATURATION: 98 % | SYSTOLIC BLOOD PRESSURE: 117 MMHG | HEART RATE: 57 BPM | DIASTOLIC BLOOD PRESSURE: 74 MMHG

## 2019-12-17 DIAGNOSIS — T36.95XA ANTIBIOTIC-INDUCED YEAST INFECTION: ICD-10-CM

## 2019-12-17 DIAGNOSIS — S67.196A CRUSHING INJURY OF RIGHT LITTLE FINGER, INITIAL ENCOUNTER: ICD-10-CM

## 2019-12-17 DIAGNOSIS — Z02.6 ENCOUNTER RELATED TO WORKER'S COMPENSATION CLAIM: ICD-10-CM

## 2019-12-17 DIAGNOSIS — S67.196A CRUSHING INJURY OF RIGHT LITTLE FINGER, INITIAL ENCOUNTER: Primary | ICD-10-CM

## 2019-12-17 DIAGNOSIS — B37.9 ANTIBIOTIC-INDUCED YEAST INFECTION: ICD-10-CM

## 2019-12-17 PROCEDURE — 73140 X-RAY EXAM OF FINGER(S): CPT | Mod: RT

## 2019-12-17 PROCEDURE — 99203 OFFICE O/P NEW LOW 30 MIN: CPT | Performed by: NURSE PRACTITIONER

## 2019-12-17 RX ORDER — CEPHALEXIN 500 MG/1
500 CAPSULE ORAL 2 TIMES DAILY
Qty: 10 CAPSULE | Refills: 0 | Status: SHIPPED | OUTPATIENT
Start: 2019-12-17 | End: 2019-12-23

## 2019-12-17 RX ORDER — FLUCONAZOLE 150 MG/1
150 TABLET ORAL ONCE
Qty: 1 TABLET | Refills: 0 | Status: SHIPPED | OUTPATIENT
Start: 2019-12-17 | End: 2019-12-23

## 2019-12-17 ASSESSMENT — MIFFLIN-ST. JEOR: SCORE: 1219.84

## 2019-12-17 ASSESSMENT — PAIN SCALES - GENERAL: PAINLEVEL: MODERATE PAIN (4)

## 2019-12-17 NOTE — PROGRESS NOTES
"Subjective     Corrina Beasley is a 29 year old female who presents to clinic today for the following health issues:    HPI   Work Comp    Onset: Today 12/17/19 about 8:40am    Description:   Location: Right pinky finger  Character: throbbing    Intensity: mild    Progression of Symptoms: improving       Accompanying Signs & Symptoms:  Other symptoms: radiation of pain to right hand, warm    History:   Previous similar pain: no       Precipitating factors:   Trauma or overuse: YES- Right pinky finger slammed between window when trying to close window, window came down and shut on finger    Alleviating factors:  Improved by: Ibuprofen    Therapies Tried and outcome: Ice right after injury, Ibuprofen      Pleasant 29 year old female presents with crush injury to right little finger that occurred this morning. She works in a [employer] and was in a patient room. The window was open and she went to close it and it slammed shut on her finger. She is able to move it but it's quite painful. Left hand dominant. Not pregnant or breastfeeding.        Reviewed and updated as needed this visit by Provider         Review of Systems   ROS COMP: Constitutional, HEENT, cardiovascular, pulmonary, gi and gu systems are negative, except as otherwise noted.      Objective    /74 (BP Location: Right arm, Patient Position: Chair, Cuff Size: Adult Regular)   Pulse 57   Temp 98.4  F (36.9  C) (Oral)   Resp 18   Ht 1.549 m (5' 1\")   Wt 56.2 kg (124 lb)   LMP 12/04/2019 (Exact Date)   SpO2 98%   BMI 23.43 kg/m    Body mass index is 23.43 kg/m .  Physical Exam   GENERAL: healthy, alert and no distress  MS: left little finger with tenderness from DIP to tip of finger  SKIN: minor abrasion with dried blood to left little finger. No erythema or ecchymosis  PSYCH: mentation appears normal, affect normal/bright    Diagnostic Test Results:  Xray -     RIGHT FINGER TWO OR MORE VIEWS  12/17/2019 1:43 PM      HISTORY: Crush injury this morning - " window slammed on finger.  Crushing injury of right little finger, initial encounter.     COMPARISON: None.                                                                      IMPRESSION: Small ossicle adjacent to the tuft of the distal phalanx  of the small finger. This could be a subtle fracture. Otherwise  unremarkable.     ANTONIO BUTCHER MD        Assessment & Plan       ICD-10-CM    1. Crushing injury of right little finger, initial encounter S67.196A XR Finger Right G/E 2 Views     cephALEXin (KEFLEX) 500 MG capsule   2. Antibiotic-induced yeast infection B37.9 fluconazole (DIFLUCAN) 150 MG tablet    T36.95XA    3. Encounter related to worker's compensation claim [email or web address]    Wound is superficial and doesn't require closure   X-ray as above - possible fracture. Splinted with frog with tip of finger in slight extension and used papertape to secure.  Will give keflex for possible open fracture  Follow up in 1 week  Workability letter given         See Patient Instructions    Ice 15 minutes 4 times daily  Elevate while resting  Splint x2-4 weeks  Tylenol 650-1000 mg every 6 hours as needed for pain  Cephalexin 1 tab twice daily x5 days for prophylaxis   Follow up with primary care provider in 1 week    Return in about 1 week (around 12/24/2019).    ESSENCE Jansen [facility]

## 2019-12-17 NOTE — LETTER
REPORT OF WORK COMP    [address] [address]  [phone]      PATIENT DATA    Employee Name: Corrina Beasley      : 1990     #: [id number]    Work related injury: Yes  Employer at time of injury:  [employer]  Employer contact & phone:    Employed elsewhere? No  Workers' Compensation Carrier/Managed Care Plan:       Today's date: 2019  Date of injury: 2019  Date of first visit: 2019    PROVIDER EVALUATION: Please fill in as needed.  Please give copy to employee for employer.    1. Diagnosis: crush injury of right little finger, open fracture    2. Treatment: splint, rest, ice.  3. Medication: tylenol, keflex  NOTE: When ordering a medication, [place] Rules require Work Comp or WC on prescriptions.    4. No work for the rest of the day today.  5. Return to work date: 2019   ** WITH RESTRICTIONS? Yes, with work restrictions: left hand work with right hand assist. She is left handed.  DURATION OF LIMITATIONS: 1 week      RESTRICTIONS: Unlimited unless listed.  Restrictions apply to home and leisure also.  If work restrictions is not available, the employee is totally disabled.    Maximum Medical Improvement (Date): 2020  Any Permanent Partial Disability? Deferred to future exam/consult.    Provider comments: as above    Medical Examiner: ESSENCE Jansen CNP           License or registration: [id number]    Next appointment: 1 week    CC: Employer, Managed Care Plan/Payor, Patient

## 2019-12-17 NOTE — PATIENT INSTRUCTIONS
Ice 15 minutes 4 times daily  Elevate while resting  Splint x2-4 weeks  Tylenol 650-1000 mg every 6 hours as needed for pain  Cephalexin 1 tab twice daily x5 days for prophylaxis   Follow up with primary care provider in 1 week  Patient Education     Finger Fracture, Open  You have a broken finger (fracture) with a nearby cut, puncture, or deep scrape. This causes local pain, swelling, and bruising. Because of the open injury, you are at risk for infection in the skin and bone. You will take antibiotics to lower the risk for infection.   This injury usually takes about 4 weeks to heal. Finger injuries are often treated with a splint or cast, or by taping the injured finger to the next one (buddy taping). This protects the injured finger and holds the bone in position while it heals. More serious fractures may need surgery.  If the fingernail has been severely injured, it will probably fall off in 1 to 2 weeks. A new fingernail will usually start to grow back within a month.  Home care  Follow these guidelines when caring for yourself at home:    Keep your hand elevated to reduce pain and swelling. When sitting or lying down keep your arm above the level of your heart. You can do this by placing your arm on a pillow that rests on your chest or on a pillow at your side. This is most important during the first 2 days (48 hours) after the injury.    Put an ice pack on the injured area. Do this for 20 minutes every 1 to 2 hours the first day for pain relief. You can make an ice pack by wrapping a plastic bag of ice cubes in a thin towel. As the ice melts, be careful that the cast or splint doesn t get wet. Continue using the ice pack 3 to 4 times a day until the pain and swelling go away.    Keep the cast or splint completely dry at all times. Bathe with your cast or splint out of the water. Protect it with a large plastic bag, rubber-banded at the top end. If a fiberglass cast or splint gets wet, you can dry it with a  hair dryer.    You may use acetaminophen or ibuprofen to control pain, unless another pain medicine was prescribed. If you have chronic liver or kidney disease, talk with your healthcare provider before using these medicines. Also talk with your provider if you ve had a stomach ulcer or gastrointestinal bleeding.    If charlie tape was applied and it becomes wet or dirty, change it. You may replace it with paper, plastic, or cloth tape. Cloth tape and paper tapes must be kept dry. Keep the charlie tape in place for at least 4 weeks.    Take all antibiotics until you have finished them.    Don t put creams or objects under the cast if you have itching.  Follow-up care  Follow up with your healthcare provider, or as advised. This is to make sure the bone is healing the way it should.  X-rays may be taken. You will be told of any new findings that may affect your care.  When to seek medical advice  Call your healthcare provider right away if any of these occur:    The cast or splint cracks    The plaster cast or splint becomes wet or soft    The fiberglass cast or splint stays wet for more than 24 hours    Pain or swelling gets worse    Tightness or pressure under the cast or splint gets worse    Finger becomes cold, blue, numb, or tingly    You can t move your finger    Redness, warmth, swelling, drainage from the wound, or foul odor from a cast or splint    Fever of 100.4 F (38 C) or higher, or as directed by your healthcare provider   Date Last Reviewed: 2/1/2017 2000-2018 [employer]. [address] [place] [address], [place] [address]. All rights reserved. This information is not intended as a substitute for professional medical care. Always follow your healthcare professional's instructions.

## 2019-12-18 LAB
FINAL DIAGNOSIS: NORMAL
HPV HR 12 DNA CVX QL NAA+PROBE: NEGATIVE
HPV16 DNA SPEC QL NAA+PROBE: NEGATIVE
HPV18 DNA SPEC QL NAA+PROBE: NEGATIVE
SPECIMEN DESCRIPTION: NORMAL
SPECIMEN SOURCE CVX/VAG CYTO: NORMAL

## 2019-12-23 ENCOUNTER — OFFICE VISIT (OUTPATIENT)
Dept: FAMILY MEDICINE | Facility: CLINIC | Age: 29
End: 2019-12-23
Payer: OTHER MISCELLANEOUS

## 2019-12-23 VITALS
DIASTOLIC BLOOD PRESSURE: 60 MMHG | RESPIRATION RATE: 14 BRPM | HEIGHT: 61 IN | TEMPERATURE: 97.9 F | HEART RATE: 65 BPM | WEIGHT: 127 LBS | OXYGEN SATURATION: 99 % | SYSTOLIC BLOOD PRESSURE: 96 MMHG | BODY MASS INDEX: 23.98 KG/M2

## 2019-12-23 DIAGNOSIS — S67.196D CRUSHING INJURY OF RIGHT LITTLE FINGER, SUBSEQUENT ENCOUNTER: Primary | ICD-10-CM

## 2019-12-23 DIAGNOSIS — Z02.6 ENCOUNTER RELATED TO WORKER'S COMPENSATION CLAIM: ICD-10-CM

## 2019-12-23 PROCEDURE — 99213 OFFICE O/P EST LOW 20 MIN: CPT | Performed by: NURSE PRACTITIONER

## 2019-12-23 ASSESSMENT — MIFFLIN-ST. JEOR: SCORE: 1233.45

## 2019-12-23 NOTE — PATIENT INSTRUCTIONS
Ice 15 minutes 4 times daily  Elevate while resting  Splint x2-4 weeks  Tylenol 650-1000 mg every 6 hours as needed for pain  Follow up with orthopedics

## 2019-12-31 ENCOUNTER — OFFICE VISIT (OUTPATIENT)
Dept: ORTHOPEDICS | Facility: CLINIC | Age: 29
End: 2019-12-31
Payer: OTHER MISCELLANEOUS

## 2019-12-31 VITALS
HEIGHT: 62 IN | BODY MASS INDEX: 23 KG/M2 | RESPIRATION RATE: 16 BRPM | HEART RATE: 64 BPM | DIASTOLIC BLOOD PRESSURE: 69 MMHG | SYSTOLIC BLOOD PRESSURE: 127 MMHG | WEIGHT: 125 LBS

## 2019-12-31 DIAGNOSIS — S62.666A CLOSED NONDISPLACED FRACTURE OF DISTAL PHALANX OF RIGHT LITTLE FINGER, INITIAL ENCOUNTER: Primary | ICD-10-CM

## 2019-12-31 PROCEDURE — 99243 OFF/OP CNSLTJ NEW/EST LOW 30: CPT | Performed by: ORTHOPAEDIC SURGERY

## 2019-12-31 ASSESSMENT — MIFFLIN-ST. JEOR: SCORE: 1232.31

## 2019-12-31 NOTE — PROGRESS NOTES
Corrina Beasley is a 29 year old female who is seen in consultation at the request of Sonya Mckenzie CNP  for right small finger crush injury 12/17/19.  A window slammed down on her small finger.  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  She had severe pain in the finger initially.  It is improving.  She is used ice and ibuprofen.  She has a AlumaFoam splint but it is uncomfortable for her.    X-ray is reviewed with her showing minimally displaced fracture of the distal phalanx right small finger.    History reviewed. No pertinent past medical history.    History reviewed. No pertinent surgical history.    History reviewed. No pertinent family history.    Social History     Socioeconomic History     Marital status: Single     Spouse name: Not on file     Number of children: Not on file     Years of education: Not on file     Highest education level: Not on file   Occupational History     Not on file   Social Needs     Financial resource strain: Not on file     Food insecurity:     Worry: Not on file     Inability: Not on file     Transportation needs:     Medical: Not on file     Non-medical: Not on file   Tobacco Use     Smoking status: Never Smoker     Smokeless tobacco: Never Used   Substance and Sexual Activity     Alcohol use: Not Currently     Drug use: Never     Sexual activity: Yes     Partners: Male     Birth control/protection: None   Lifestyle     Physical activity:     Days per week: Not on file     Minutes per session: Not on file     Stress: Not on file   Relationships     Social connections:     Talks on phone: Not on file     Gets together: Not on file     Attends Holiness service: Not on file     Active member of club or organization: Not on file     Attends meetings of clubs or organizations: Not on file     Relationship status: Not on file     Intimate partner violence:     Fear of  "current or ex partner: Not on file     Emotionally abused: Not on file     Physically abused: Not on file     Forced sexual activity: Not on file   Other Topics Concern     Not on file   Social History Narrative     Not on file       Current Outpatient Medications   Medication Sig Dispense Refill     Calcium-Phosphorus-Vitamin D (CALCIUM/VITAMIN D3/ADULT GUMMY PO)        levothyroxine (SYNTHROID/LEVOTHROID) 88 MCG tablet Take 1 tablet (88 mcg) by mouth daily 90 tablet 3     Multiple Vitamins-Minerals (HM MULTIVITAMIN ADULT GUMMY) CHEW          Allergies   Allergen Reactions     Seasonal Allergies        REVIEW OF SYSTEMS:  CONSTITUTIONAL:  NEGATIVE for fever, chills, change in weight, not feeling tired  SKIN:  NEGATIVE for worrisome rashes, no skin lumps, no skin ulcers and no non-healing wounds  EYES:  NEGATIVE for vision changes or irritation.  ENT/MOUTH:  NEGATIVE.  No hearing loss, no hoarseness, no difficulty swallowing.  RESP:  NEGATIVE. No cough or shortness of breath.  CV:  NEGATIVE for chest pain, palpitations or peripheral edema  GI:  NEGATIVE for nausea, abdominal pain, heartburn, or change in bowel habits  :  Negative. No dysuria, no hematuria  MUSCULOSKELETAL:  See HPI above  NEURO:  NEGATIVE . No headaches, no dizziness,  no numbness  ENDOCRINE:  NEGATIVE for temperature intolerance, skin/hair changes  HEME/ALLERGY/IMMUNE:  NEGATIVE for bleeding problems  PSYCHIATRIC:  NEGATIVE. no anxiety, no depression.     Exam:  Vitals: /69   Pulse 64   Resp 16   Ht 1.562 m (5' 1.5\")   Wt 56.7 kg (125 lb)   LMP 12/04/2019 (Exact Date)   BMI 23.24 kg/m    BMI= Body mass index is 23.24 kg/m .  Constitutional:  healthy, alert and no distress  Neuro: Alert and Oriented x 3, Sensation grossly WNL.  Psych: Affect normal   Respiratory: Breathing not labored.  Cardiovascular: normal peripheral pulses  Lymph: no adenopathy  Skin: No rashes,worrisome lesions or skin problems  She has some bruising of the right " small fingernail.  She has 2 sites of abrasion on the fingertip.  She has normal sensation to the fingertip.  The finger is quite sensitive to touch.  She does have full range of motion of the finger.  No warmth or erythema.  No drainage.    Assessment: Right small finger tip fracture.  There is no evidence of nailbed injury.  There is no infection.    Plan: This needs symptomatic treatment only.  I have fitted her with a stack splint, which is more comfortable for her.  She will use this as long as it feels like the fingertip needs to be protected.  This will probably take another month.  She does not need follow-up x-ray.  There will be no permanent impairment from this injury.

## 2019-12-31 NOTE — LETTER
12/31/2019         RE: Corrina Beasley  [address]  [facility] [address]        Dear Colleague,    Thank you for referring your patient, Corrina Beasley, to the [facility]. Please see a copy of my visit note below.    Corrina Beasley is a 29 year old female who is seen in consultation at the request of Sonya Mckenzie CNP  for right small finger crush injury 12/17/19.  A window slammed down on her small finger.  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  She had severe pain in the finger initially.  It is improving.  She is used ice and ibuprofen.  She has a AlumaFoam splint but it is uncomfortable for her.    X-ray is reviewed with her showing minimally displaced fracture of the distal phalanx right small finger.    History reviewed. No pertinent past medical history.    History reviewed. No pertinent surgical history.    History reviewed. No pertinent family history.    Social History     Socioeconomic History     Marital status: Single     Spouse name: Not on file     Number of children: Not on file     Years of education: Not on file     Highest education level: Not on file   Occupational History     Not on file   Social Needs     Financial resource strain: Not on file     Food insecurity:     Worry: Not on file     Inability: Not on file     Transportation needs:     Medical: Not on file     Non-medical: Not on file   Tobacco Use     Smoking status: Never Smoker     Smokeless tobacco: Never Used   Substance and Sexual Activity     Alcohol use: Not Currently     Drug use: Never     Sexual activity: Yes     Partners: Male     Birth control/protection: None   Lifestyle     Physical activity:     Days per week: Not on file     Minutes per session: Not on file     Stress: Not on file   Relationships     Social connections:     Talks on phone: Not on file     Gets together:  "Not on file     Attends Orthodoxy service: Not on file     Active member of club or organization: Not on file     Attends meetings of clubs or organizations: Not on file     Relationship status: Not on file     Intimate partner violence:     Fear of current or ex partner: Not on file     Emotionally abused: Not on file     Physically abused: Not on file     Forced sexual activity: Not on file   Other Topics Concern     Not on file   Social History Narrative     Not on file       Current Outpatient Medications   Medication Sig Dispense Refill     Calcium-Phosphorus-Vitamin D (CALCIUM/VITAMIN D3/ADULT GUMMY PO)        levothyroxine (SYNTHROID/LEVOTHROID) 88 MCG tablet Take 1 tablet (88 mcg) by mouth daily 90 tablet 3     Multiple Vitamins-Minerals (HM MULTIVITAMIN ADULT GUMMY) CHEW          Allergies   Allergen Reactions     Seasonal Allergies        REVIEW OF SYSTEMS:  CONSTITUTIONAL:  NEGATIVE for fever, chills, change in weight, not feeling tired  SKIN:  NEGATIVE for worrisome rashes, no skin lumps, no skin ulcers and no non-healing wounds  EYES:  NEGATIVE for vision changes or irritation.  ENT/MOUTH:  NEGATIVE.  No hearing loss, no hoarseness, no difficulty swallowing.  RESP:  NEGATIVE. No cough or shortness of breath.  CV:  NEGATIVE for chest pain, palpitations or peripheral edema  GI:  NEGATIVE for nausea, abdominal pain, heartburn, or change in bowel habits  :  Negative. No dysuria, no hematuria  MUSCULOSKELETAL:  See HPI above  NEURO:  NEGATIVE . No headaches, no dizziness,  no numbness  ENDOCRINE:  NEGATIVE for temperature intolerance, skin/hair changes  HEME/ALLERGY/IMMUNE:  NEGATIVE for bleeding problems  PSYCHIATRIC:  NEGATIVE. no anxiety, no depression.     Exam:  Vitals: /69   Pulse 64   Resp 16   Ht 1.562 m (5' 1.5\")   Wt 56.7 kg (125 lb)   LMP 12/04/2019 (Exact Date)   BMI 23.24 kg/m     BMI= Body mass index is 23.24 kg/m .  Constitutional:  healthy, alert and no distress  Neuro: Alert " and Oriented x 3, Sensation grossly WNL.  Psych: Affect normal   Respiratory: Breathing not labored.  Cardiovascular: normal peripheral pulses  Lymph: no adenopathy  Skin: No rashes,worrisome lesions or skin problems  She has some bruising of the right small fingernail.  She has 2 sites of abrasion on the fingertip.  She has normal sensation to the fingertip.  The finger is quite sensitive to touch.  She does have full range of motion of the finger.  No warmth or erythema.  No drainage.    Assessment: Right small finger tip fracture.  There is no evidence of nailbed injury.  There is no infection.    Plan: This needs symptomatic treatment only.  I have fitted her with a stack splint, which is more comfortable for her.  She will use this as long as it feels like the fingertip needs to be protected.  This will probably take another month.  She does not need follow-up x-ray.  There will be no permanent impairment from this injury.      Again, thank you for allowing me to participate in the care of your patient.        Sincerely,        Dylan Mosher MD

## 2020-01-14 ENCOUNTER — TELEPHONE (OUTPATIENT)
Dept: ORTHOPEDICS | Facility: CLINIC | Age: 30
End: 2020-01-14

## 2020-01-14 NOTE — TELEPHONE ENCOUNTER
Called and spoke to Corrina. Work note will be provided for her. Discussed her options of delivery of note. She voices her understanding. All questions were asked and answered. She was in agreement with the plan and wished to proceed.

## 2020-01-14 NOTE — TELEPHONE ENCOUNTER
Reason for Call:  Other note    Detailed comments: Pt calling in regards to the note for her after visit summary and restrictions. Please fill out and leave at  in [facility] office.    Phone Number Patient can be reached at: Home number on file [phone] (home)    Best Time: After 3:45 PM    Can we leave a detailed message on this number? YES    Call taken on 1/14/2020 at 1:23 PM by Arsh Workman

## 2020-01-14 NOTE — TELEPHONE ENCOUNTER
Pt needs an after visit summary/ restrictions  for work. Please call pt to advise.      Thank you,   Debra RENAE   Central Scheduling  [phone]

## 2020-01-14 NOTE — LETTER
[address] [address]  [phone]      2020    Corrina Beasley  [address]  [facility] [address]  [phone] (home)     : 1990      To Whom it may concern:    Corrina Beasley was seen in our clinic on, 2019 for her right small finger. She was diagnosed with a right small finger tip fracture. She may do work as tolerated with no specific restrictions. Expected date of healing is approximately one month. There will be no permanent impairment from this injury.       Sincerely,        Dylan Mosher MD

## 2020-01-24 ENCOUNTER — MYC REFILL (OUTPATIENT)
Dept: ENDOCRINOLOGY | Facility: CLINIC | Age: 30
End: 2020-01-24

## 2020-01-24 DIAGNOSIS — E03.9 ACQUIRED HYPOTHYROIDISM: ICD-10-CM

## 2020-01-24 RX ORDER — LEVOTHYROXINE SODIUM 88 UG/1
88 TABLET ORAL DAILY
Qty: 30 TABLET | Refills: 0 | Status: SHIPPED | OUTPATIENT
Start: 2020-01-24 | End: 2020-01-29

## 2020-01-24 NOTE — TELEPHONE ENCOUNTER
levothyroxine (SYNTHROID/LEVOTHROID) 88 MCG tablet      Last Written Prescription Date:  1-18-19  Last Fill Quantity: 90,   # refills: 3  Last Office Visit : 1-16-19  Future Office visit:  none    Past due appt- 30 day RF sent to pharm  Scheduling has been notified to contact the pt for appointment.

## 2020-01-28 ENCOUNTER — OFFICE VISIT (OUTPATIENT)
Dept: FAMILY MEDICINE | Facility: CLINIC | Age: 30
End: 2020-01-28
Payer: COMMERCIAL

## 2020-01-28 VITALS
WEIGHT: 125 LBS | RESPIRATION RATE: 18 BRPM | BODY MASS INDEX: 23.6 KG/M2 | SYSTOLIC BLOOD PRESSURE: 119 MMHG | HEART RATE: 62 BPM | TEMPERATURE: 98.1 F | DIASTOLIC BLOOD PRESSURE: 72 MMHG | OXYGEN SATURATION: 100 % | HEIGHT: 61 IN

## 2020-01-28 DIAGNOSIS — Z13.6 CARDIOVASCULAR SCREENING; LDL GOAL LESS THAN 160: ICD-10-CM

## 2020-01-28 DIAGNOSIS — Z30.09 ENCOUNTER FOR OTHER GENERAL COUNSELING OR ADVICE ON CONTRACEPTION: ICD-10-CM

## 2020-01-28 DIAGNOSIS — E03.9 ACQUIRED HYPOTHYROIDISM: ICD-10-CM

## 2020-01-28 DIAGNOSIS — Z00.00 ROUTINE GENERAL MEDICAL EXAMINATION AT A HEALTH CARE FACILITY: Primary | ICD-10-CM

## 2020-01-28 PROCEDURE — 36415 COLL VENOUS BLD VENIPUNCTURE: CPT | Performed by: NURSE PRACTITIONER

## 2020-01-28 PROCEDURE — 99395 PREV VISIT EST AGE 18-39: CPT | Performed by: NURSE PRACTITIONER

## 2020-01-28 PROCEDURE — 84443 ASSAY THYROID STIM HORMONE: CPT | Performed by: NURSE PRACTITIONER

## 2020-01-28 RX ORDER — LEVOTHYROXINE SODIUM 88 UG/1
88 TABLET ORAL DAILY
Qty: 90 TABLET | Refills: 3 | Status: CANCELLED | OUTPATIENT
Start: 2020-01-28

## 2020-01-28 ASSESSMENT — MIFFLIN-ST. JEOR: SCORE: 1228.34

## 2020-01-28 ASSESSMENT — PAIN SCALES - GENERAL: PAINLEVEL: NO PAIN (0)

## 2020-01-28 NOTE — PROGRESS NOTES
SUBJECTIVE:   CC: Corrina Beasley is an 29 year old woman who presents for preventive health visit.     Healthy Habits:    Do you get at least three servings of calcium containing foods daily (dairy, green leafy vegetables, etc.)? no    Amount of exercise or daily activities, outside of work: 3 day(s) per week    Problems taking medications regularly No    Medication side effects: No    Have you had an eye exam in the past two years? no    Do you see a dentist twice per year? yes    Do you have sleep apnea, excessive snoring or daytime drowsiness?no    Birth Control Consultation - Patient leaning towards the Mirena. Paragard taken out 2.5 years ago due to pelvic pain if related.    Hypothyroidism Follow-up  Patient states due for appointment prior to refills and endocrinologist said it would be okay with family doctor.      Since last visit, patient describes the following symptoms: Weight stable, no hair loss, no skin changes, no constipation, no loose stools    Considering IUD. Previously had Paragard but had it removed 5 years ago because of abdominal pain of unknown cause. Removing it didn't really help the discomfort that occurs sporadically. Thinking about Mirena or Kyleena.    Today's PHQ-2 Score:   PHQ-2 ( 1999 Pfizer) 1/28/2020   Q1: Little interest or pleasure in doing things 0   Q2: Feeling down, depressed or hopeless 0   PHQ-2 Score 0       Abuse: Current or Past(Physical, Sexual or Emotional)- No  Do you feel safe in your environment? Yes        Social History     Tobacco Use     Smoking status: Never Smoker     Smokeless tobacco: Never Used   Substance Use Topics     Alcohol use: Yes     Comment: 2 drinks or less per week     If you drink alcohol do you typically have >3 drinks per day or >7 drinks per week? No                     Reviewed orders with patient.  Reviewed health maintenance and updated orders accordingly - Yes  Lab work is in process  Labs reviewed in EPIC  BP Readings from Last 3  Encounters:   01/28/20 119/72   12/31/19 127/69   12/23/19 96/60    Wt Readings from Last 3 Encounters:   01/28/20 56.7 kg (125 lb)   12/31/19 56.7 kg (125 lb)   12/23/19 57.6 kg (127 lb)                  Patient Active Problem List   Diagnosis     Acquired hypothyroidism     CARDIOVASCULAR SCREENING; LDL GOAL LESS THAN 160     History reviewed. No pertinent surgical history.    Social History     Tobacco Use     Smoking status: Never Smoker     Smokeless tobacco: Never Used   Substance Use Topics     Alcohol use: Yes     Comment: 2 drinks or less per week     Family History   Problem Relation Age of Onset     Hypertension Father      Cancer Maternal Grandfather      Hypothyroidism Paternal Grandmother      Cancer Paternal Grandfather      Hypothyroidism Paternal Aunt          Current Outpatient Medications   Medication Sig Dispense Refill     levothyroxine (SYNTHROID/LEVOTHROID) 88 MCG tablet Take 1 tablet (88 mcg) by mouth daily For  refills, please schedule a follow-up appointment at [phone] 30 tablet 0     Multiple Vitamins-Minerals (HM MULTIVITAMIN ADULT GUMMY) CHEW        Calcium-Phosphorus-Vitamin D (CALCIUM/VITAMIN D3/ADULT GUMMY PO)        Allergies   Allergen Reactions     Seasonal Allergies      Recent Labs   Lab Test 07/16/19  1629 01/16/19  0905   TSH 1.53 3.77        Mammogram not appropriate for this patient based on age.    Pertinent mammograms are reviewed under the imaging tab.  History of abnormal Pap smear: NO - age 30-65 PAP every 5 years with negative HPV co-testing recommended  PAP / HPV Latest Ref Rng & Units 12/12/2019   PAP - NIL   HPV 16 DNA NEG:Negative Negative   HPV 18 DNA NEG:Negative Negative   OTHER HR HPV NEG:Negative Negative     Reviewed and updated as needed this visit by clinical staff  Tobacco  Allergies  Meds  Problems  Med Hx  Surg Hx  Fam Hx  Soc Hx          Reviewed and updated as needed this visit by Provider  Tobacco  Allergies  Meds  Problems  Med Hx   "Surg Hx  Fam Hx        History reviewed. No pertinent past medical history.   History reviewed. No pertinent surgical history.  OB History   No obstetric history on file.       ROS:  CONSTITUTIONAL: NEGATIVE for fever, chills, change in weight  INTEGUMENTARU/SKIN: NEGATIVE for worrisome rashes, moles or lesions  EYES: NEGATIVE for vision changes or irritation  ENT: NEGATIVE for ear, mouth and throat problems  RESP: NEGATIVE for significant cough or SOB  BREAST: NEGATIVE for masses, tenderness or discharge  CV: NEGATIVE for chest pain, palpitations or peripheral edema  GI: NEGATIVE for nausea, abdominal pain, heartburn, or change in bowel habits  : NEGATIVE for unusual urinary or vaginal symptoms. Periods are regular.  MUSCULOSKELETAL: NEGATIVE for significant arthralgias or myalgia  NEURO: NEGATIVE for weakness, dizziness or paresthesias  ENDOCRINE: NEGATIVE for temperature intolerance, skin/hair changes  PSYCHIATRIC: NEGATIVE for changes in mood or affect    OBJECTIVE:   /72 (BP Location: Right arm, Patient Position: Chair, Cuff Size: Adult Regular)   Pulse 62   Temp 98.1  F (36.7  C) (Oral)   Resp 18   Ht 1.556 m (5' 1.25\")   Wt 56.7 kg (125 lb)   LMP 01/07/2020 (Exact Date)   SpO2 100%   BMI 23.43 kg/m    EXAM:  GENERAL: healthy, alert and no distress  EYES: Eyes grossly normal to inspection, PERRL and conjunctivae and sclerae normal  HENT: ear canals and TM's normal, nose and mouth without ulcers or lesions  NECK: no adenopathy, no asymmetry, masses, or scars and thyroid normal to palpation  RESP: lungs clear to auscultation - no rales, rhonchi or wheezes  CV: regular rate and rhythm, normal S1 S2, no S3 or S4, no murmur, click or rub, no peripheral edema and peripheral pulses strong  ABDOMEN: soft, nontender, no hepatosplenomegaly, no masses and bowel sounds normal  MS: no gross musculoskeletal defects noted, no edema  SKIN: no suspicious lesions or rashes  NEURO: Normal strength and tone, " "mentation intact and speech normal  PSYCH: mentation appears normal, affect normal/bright    Diagnostic Test Results:  Labs reviewed in Epic  Results for orders placed or performed in visit on 01/28/20   TSH with free T4 reflex     Status: None   Result Value Ref Range    TSH 0.81 0.40 - 4.00 mU/L       ASSESSMENT/PLAN:   1. Routine general medical examination at a health care facility      2. Acquired hypothyroidism  Controlled. Refilled.  - TSH with free T4 reflex  - levothyroxine (SYNTHROID/LEVOTHROID) 88 MCG tablet; Take 1 tablet (88 mcg) by mouth daily  Dispense: 90 tablet; Refill: 3    3. CARDIOVASCULAR SCREENING; LDL GOAL LESS THAN 160  She will make fasting lab appointment.   - Lipid panel reflex to direct LDL Fasting; Future    4. Encounter for other general counseling or advice on contraception  Discussed Kyleena vs Mirena. Gave handout. She will schedule with a provider who placed IUD.      COUNSELING:   Reviewed preventive health counseling, as reflected in patient instructions       Regular exercise       Healthy diet/nutrition       Safe sex practices/STD prevention    Estimated body mass index is 23.43 kg/m  as calculated from the following:    Height as of this encounter: 1.556 m (5' 1.25\").    Weight as of this encounter: 56.7 kg (125 lb).         reports that she has never smoked. She has never used smokeless tobacco.    The benefits, risks and potential side effects were discussed in detail. Black box warnings discussed as relevant. All patient questions were answered. The patient was instructed to follow up immediately if any adverse reactions develop.    Return precautions discussed, including when to seek urgent/emergent care.    Patient verbalizes understanding and agrees with plan of care. Patient stable for discharge.      Counseling Resources:  ATP IV Guidelines  Pooled Cohorts Equation Calculator  Breast Cancer Risk Calculator  FRAX Risk Assessment  ICSI Preventive Guidelines  Dietary " Guidelines for Americans, 2010  USDA's MyPlate  ASA Prophylaxis  Lung CA Screening    ESSENCE Jansen CNP  [facility]

## 2020-01-28 NOTE — PATIENT INSTRUCTIONS
At [facility], we strive to deliver an exceptional experience to you, every time we see you.  If you receive a survey in the mail, please send us back your thoughts. We really do value your feedback.    Based on your medical history, these are the current health maintenance/preventive care services that you are due for (some may have been done at this visit.)  Health Maintenance Due   Topic Date Due     PREVENTIVE CARE VISIT  1989     DTAP/TDAP/TD IMMUNIZATION (1 - Tdap) 08/31/2000     HIV SCREENING  08/31/2004     INFLUENZA VACCINE (1) 09/01/2019     PHQ-2  01/01/2020         Suggested websites for health information:  Www.[facility].org : Up to date and easily searchable information on multiple topics.  Www.medlineplus.gov : medication info, interactive tutorials, watch real surgeries online  Www.familydoctor.org : good info from the Academy of Family Physicians  Www.cdc.gov : public health info, travel advisories, epidemics (H1N1)  Www.aap.org : children's health info, normal development, vaccinations  Www.health.[facility].[place].us : [place] dept of health, public health issues in [place], N1N1    Your care team:                            Family Medicine Internal Medicine   MD Ramon Hollis MD Shantel Branch-Fleming, MD Katya Georgiev PA-C Nam Ho, MD Pediatrics   KELL Wellington, MD Bonnie Griffin CNP, MD Deborah Mielke, MD Kim Thein, APRN CNP      Clinic hours: Monday - Thursday 7 am-7 pm; Fridays 7 am-5 pm.   Urgent care: Monday - Friday 11 am-9 pm; Saturday and Sunday 9 am-5 pm.  Pharmacy : Monday -Thursday 8 am-8 pm; Friday 8 am-6 pm; Saturday and Sunday 9 am-5 pm.     Clinic: [phone]   Pharmacy: [phone]      Preventive Health Recommendations  Female Ages 26 - 39  Yearly exam:   See your health care provider every year in order to    Review health changes.     Discuss preventive care.      Review  your medicines if you your doctor has prescribed any.    Until age 30: Get a Pap test every three years (more often if you have had an abnormal result).    After age 30: Talk to your doctor about whether you should have a Pap test every 3 years or have a Pap test with HPV screening every 5 years.   You do not need a Pap test if your uterus was removed (hysterectomy) and you have not had cancer.  You should be tested each year for STDs (sexually transmitted diseases), if you're at risk.   Talk to your provider about how often to have your cholesterol checked.  If you are at risk for diabetes, you should have a diabetes test (fasting glucose).  Shots: Get a flu shot each year. Get a tetanus shot every 10 years.   Nutrition:     Eat at least 5 servings of fruits and vegetables each day.    Eat whole-grain bread, whole-wheat pasta and brown rice instead of white grains and rice.    Get adequate Calcium and Vitamin D.     Lifestyle    Exercise at least 150 minutes a week (30 minutes a day, 5 days of the week). This will help you control your weight and prevent disease.    Limit alcohol to one drink per day.    No smoking.     Wear sunscreen to prevent skin cancer.    See your dentist every six months for an exam and cleaning.  Yasmine: Dr. Zafar or Darcy Oconnell, CNP

## 2020-01-29 LAB — TSH SERPL DL<=0.005 MIU/L-ACNC: 0.81 MU/L (ref 0.4–4)

## 2020-01-29 RX ORDER — LEVOTHYROXINE SODIUM 88 UG/1
88 TABLET ORAL DAILY
Qty: 90 TABLET | Refills: 3 | Status: SHIPPED | OUTPATIENT
Start: 2020-01-29 | End: 2021-02-16

## 2020-02-01 DIAGNOSIS — Z13.6 CARDIOVASCULAR SCREENING; LDL GOAL LESS THAN 160: ICD-10-CM

## 2020-02-01 LAB
CHOLEST SERPL-MCNC: 156 MG/DL
HDLC SERPL-MCNC: 65 MG/DL
LDLC SERPL CALC-MCNC: 80 MG/DL
NONHDLC SERPL-MCNC: 91 MG/DL
TRIGL SERPL-MCNC: 53 MG/DL

## 2020-05-22 ENCOUNTER — NURSE TRIAGE (OUTPATIENT)
Dept: NURSING | Facility: CLINIC | Age: 30
End: 2020-05-22

## 2020-05-22 DIAGNOSIS — Z20.822 SUSPECTED COVID-19 VIRUS INFECTION: Primary | ICD-10-CM

## 2020-05-22 NOTE — TELEPHONE ENCOUNTER
"Patient is calling requesting COVID serologic antibody testing.  NOTE: Serologic testing is a blood test for 'antibodies' which are made at 10-14 days after you have had symptoms of COVID or were exposed and had an asymptomatic infection.  This does NOT test you for 'active' infection or tell you if you are contagious.    Are you a healthcare worker?  Yes  Do you work for [employer]?   No  Do you currently have a cough, fever, body aches, shortness of breath, or difficulty breathing?  No  Have you been exposed to (or come into close contact with) someone who tested POSITIVE for COVID-19 or someone who had a possible case of COVID-19?  Confirmed exposure >14 days ago.  Confirmed exposure > 14 days ago.      The patient was informed: \"Testing is limited each day and it may take time for testing to be available to everyone who has called.  We will be calling you to schedule testing- please confirm the best number to reach you is [phone].\"    Lab order placed per SARS-CoV-2 Serology test Standing Order.          "

## 2020-05-26 DIAGNOSIS — Z20.822 SUSPECTED COVID-19 VIRUS INFECTION: ICD-10-CM

## 2020-05-26 PROCEDURE — 36415 COLL VENOUS BLD VENIPUNCTURE: CPT | Performed by: EMERGENCY MEDICINE

## 2020-05-26 PROCEDURE — 99000 SPECIMEN HANDLING OFFICE-LAB: CPT | Performed by: EMERGENCY MEDICINE

## 2020-05-26 PROCEDURE — 86769 SARS-COV-2 COVID-19 ANTIBODY: CPT | Mod: 90 | Performed by: EMERGENCY MEDICINE

## 2020-05-27 LAB
COVID-19 SPIKE RBD ABY TITER: NORMAL
COVID-19 SPIKE RBD ABY: NEGATIVE

## 2020-09-09 ENCOUNTER — OFFICE VISIT (OUTPATIENT)
Dept: FAMILY MEDICINE | Facility: CLINIC | Age: 30
End: 2020-09-09
Payer: COMMERCIAL

## 2020-09-09 VITALS
BODY MASS INDEX: 24.73 KG/M2 | SYSTOLIC BLOOD PRESSURE: 124 MMHG | HEIGHT: 61 IN | WEIGHT: 131 LBS | DIASTOLIC BLOOD PRESSURE: 76 MMHG | TEMPERATURE: 97.3 F | OXYGEN SATURATION: 100 % | HEART RATE: 58 BPM

## 2020-09-09 DIAGNOSIS — E03.9 ACQUIRED HYPOTHYROIDISM: ICD-10-CM

## 2020-09-09 DIAGNOSIS — F41.9 ANXIETY: Primary | ICD-10-CM

## 2020-09-09 LAB
BASOPHILS # BLD AUTO: 0 10E9/L (ref 0–0.2)
BASOPHILS NFR BLD AUTO: 0.2 %
DIFFERENTIAL METHOD BLD: NORMAL
EOSINOPHIL # BLD AUTO: 0.1 10E9/L (ref 0–0.7)
EOSINOPHIL NFR BLD AUTO: 1.1 %
ERYTHROCYTE [DISTWIDTH] IN BLOOD BY AUTOMATED COUNT: 11.7 % (ref 10–15)
HCT VFR BLD AUTO: 42 % (ref 35–47)
HGB BLD-MCNC: 14 G/DL (ref 11.7–15.7)
LYMPHOCYTES # BLD AUTO: 1.7 10E9/L (ref 0.8–5.3)
LYMPHOCYTES NFR BLD AUTO: 30.9 %
MCH RBC QN AUTO: 30.5 PG (ref 26.5–33)
MCHC RBC AUTO-ENTMCNC: 33.3 G/DL (ref 31.5–36.5)
MCV RBC AUTO: 92 FL (ref 78–100)
MONOCYTES # BLD AUTO: 0.5 10E9/L (ref 0–1.3)
MONOCYTES NFR BLD AUTO: 9 %
NEUTROPHILS # BLD AUTO: 3.2 10E9/L (ref 1.6–8.3)
NEUTROPHILS NFR BLD AUTO: 58.8 %
PLATELET # BLD AUTO: 229 10E9/L (ref 150–450)
RBC # BLD AUTO: 4.59 10E12/L (ref 3.8–5.2)
TSH SERPL DL<=0.005 MIU/L-ACNC: 2.21 MU/L (ref 0.4–4)
WBC # BLD AUTO: 5.4 10E9/L (ref 4–11)

## 2020-09-09 PROCEDURE — 84443 ASSAY THYROID STIM HORMONE: CPT | Performed by: NURSE PRACTITIONER

## 2020-09-09 PROCEDURE — 36415 COLL VENOUS BLD VENIPUNCTURE: CPT | Performed by: NURSE PRACTITIONER

## 2020-09-09 PROCEDURE — 99213 OFFICE O/P EST LOW 20 MIN: CPT | Performed by: NURSE PRACTITIONER

## 2020-09-09 PROCEDURE — 82306 VITAMIN D 25 HYDROXY: CPT | Performed by: NURSE PRACTITIONER

## 2020-09-09 PROCEDURE — 85025 COMPLETE CBC W/AUTO DIFF WBC: CPT | Performed by: NURSE PRACTITIONER

## 2020-09-09 ASSESSMENT — PAIN SCALES - GENERAL: PAINLEVEL: NO PAIN (0)

## 2020-09-09 ASSESSMENT — MIFFLIN-ST. JEOR: SCORE: 1246.59

## 2020-09-09 NOTE — PROGRESS NOTES
"Subjective     Corrina Beasley is a 30 year old female who presents to clinic today for the following health issues:    HPI       Abnormal Mood Symptoms  Onset/Duration: about 4-6 months  Description:   Depression (if yes, do PHQ-9): no  Anxiety (if yes, do MARTHA-7): YES  Accompanying Signs & Symptoms:  Still participating in activities that you used to enjoy: YES  Fatigue: YES  Irritability: YES  Difficulty concentrating: YES  Changes in appetite: no  Problems with sleep: no  Heart racing/beating fast: YES  Abnormally elevated, expansive, or irritable mood: YES  Persistently increased activity or energy: no  Thoughts of hurting yourself or others: no  History:  Recent stress or major life event: YES- COVID and WORK  Prior depression or anxiety: None  Family history of depression or anxiety: no  Alcohol/drug use: no  Difficulty sleeping: no  Precipitating or alleviating factors: None  Therapies tried and outcome: none  No flowsheet data found.  No flowsheet data found.    covid related  Works [employer]  Had several residents pass away  Worried she'll bring it to or from work  Wakes up and feels anxiety  More anxious than depressed  No thoughts to harm self or others  In counseling  Exercising  Hx hypothyroid - feeling fatigued      Review of Systems   Constitutional, HEENT, cardiovascular, pulmonary, gi and gu systems are negative, except as otherwise noted.      Objective    /76 (BP Location: Left arm, Patient Position: Chair, Cuff Size: Adult Regular)   Pulse 58   Temp 97.3  F (36.3  C) (Oral)   Ht 1.549 m (5' 1\")   Wt 59.4 kg (131 lb)   SpO2 100%   BMI 24.75 kg/m    Body mass index is 24.75 kg/m .  Physical Exam   GENERAL: healthy, alert and no distress  NECK: no adenopathy, no asymmetry, masses, or scars and thyroid normal to palpation  RESP: lungs clear to auscultation - no rales, rhonchi or wheezes  CV: regular rate and rhythm, normal S1 S2, no S3 or S4, no murmur, click or rub, no peripheral edema " and peripheral pulses strong  MS: no gross musculoskeletal defects noted, no edema  PSYCH: mentation appears normal, affect normal/bright    Results for orders placed or performed in visit on 09/09/20 (from the past 24 hour(s))   CBC with platelets differential   Result Value Ref Range    WBC 5.4 4.0 - 11.0 10e9/L    RBC Count 4.59 3.8 - 5.2 10e12/L    Hemoglobin 14.0 11.7 - 15.7 g/dL    Hematocrit 42.0 35.0 - 47.0 %    MCV 92 78 - 100 fl    MCH 30.5 26.5 - 33.0 pg    MCHC 33.3 31.5 - 36.5 g/dL    RDW 11.7 10.0 - 15.0 %    Platelet Count 229 150 - 450 10e9/L    % Neutrophils 58.8 %    % Lymphocytes 30.9 %    % Monocytes 9.0 %    % Eosinophils 1.1 %    % Basophils 0.2 %    Absolute Neutrophil 3.2 1.6 - 8.3 10e9/L    Absolute Lymphocytes 1.7 0.8 - 5.3 10e9/L    Absolute Monocytes 0.5 0.0 - 1.3 10e9/L    Absolute Eosinophils 0.1 0.0 - 0.7 10e9/L    Absolute Basophils 0.0 0.0 - 0.2 10e9/L    Diff Method Automated Method            Assessment & Plan     Anxiety  Continue therapy. Start sertraline. Follow up in 3-4 weeks.  - TSH with free T4 reflex  - Vitamin D Deficiency  - CBC with platelets differential  - sertraline (ZOLOFT) 50 MG tablet; Take 0.5 tablets (25 mg) by mouth daily for 8 days, THEN 1 tablet (50 mg) daily for 22 days.    Acquired hypothyroidism  Checking TSH due to #1  - TSH with free T4 reflex       See Patient Instructions    Return in about 3 weeks (around 9/30/2020).     The benefits, risks and potential side effects were discussed in detail. Black box warnings discussed as relevant. All patient questions were answered. The patient was instructed to follow up immediately if any adverse reactions develop.    Return precautions discussed, including when to seek urgent/emergent care.    Patient verbalizes understanding and agrees with plan of care. Patient stable for discharge.      ESSENCE Jansen CNP  [facility]    This visit took place during the COVID 19 global pandemic.   PPE  worn during the visit included: surgical mask and face shield by me and mask by patient

## 2020-09-09 NOTE — PATIENT INSTRUCTIONS
If you are in crisis, you can call the Crisis Connection Hotline 24/7 at [phone]  [facility] open 24/7 for anyone in crisis for assessment, evaluation and care: [phone]  If you are thinking of hurting yourself or someone else, you can also go to the emergency department or call 911    Serotonin syndrome symptoms usually occur within several hours of taking a new drug or increasing the dose of a drug you're already taking.   Signs and symptoms include:  Agitation or restlessness  Confusion  Rapid heart rate and high blood pressure  Dilated pupils  Loss of muscle coordination or twitching muscles  Muscle rigidity  Heavy sweating  Diarrhea  Headache  Shivering  Goose bumps    Severe serotonin syndrome can be life-threatening. Signs and symptoms include:  High fever  Seizures  Irregular heartbeat  Unconsciousness    If you suspect you might have serotonin syndrome after starting a new drug or increasing the dose of a drug you're already taking, call your doctor right away or go to the emergency room. If you have severe or rapidly worsening symptoms, seek emergency treatment immediately.            Patient Education    At [facility], we strive to deliver an exceptional experience to you, every time we see you. If you receive a survey, please complete it as we do value your feedback.  If you have MyChart, you can expect to receive results automatically within 24 hours of their completion.  Your provider will send a note interpreting your results as well.   If you do not have MyChart, you should receive your results in about a week by mail.    Your care team:                            Family Medicine Internal Medicine   MD Ramon Hollis MD Shantel Branch-Fleming, MD Srinivasa Vaka, MD Katya Georgiev PA-C Megan Hill, APRN CNP Nam Ho, MD Pediatrics   KELL Wellington, EMMA  MD Enedelia Peng APRN CNP   MD Bonnie Grijalva MD Deborah Mielke, MD Kim Thein, APRN CNP Christine Bouman, PA-C Emily Bunt, MD Paola Zarate MD Angela Wermerskirchen, MD      Clinic hours: Monday - Thursday 7 am-7 pm; Fridays 7 am-5 pm.   Urgent care: Monday - Friday 11 am-9 pm; Saturday and Sunday 9 am-5 pm.    Clinic: [phone]       [facility]: Monday - Thursday 8 am - 7 pm; Friday 8 am - 6 pm  [facility]: [phone]     Use www.oncare.org for 24/7 diagnosis and treatment of dozens of conditions.

## 2020-09-10 LAB — DEPRECATED CALCIDIOL+CALCIFEROL SERPL-MC: 31 UG/L (ref 20–75)

## 2020-09-16 ASSESSMENT — ANXIETY QUESTIONNAIRES
6. BECOMING EASILY ANNOYED OR IRRITABLE: MORE THAN HALF THE DAYS
GAD7 TOTAL SCORE: 9
2. NOT BEING ABLE TO STOP OR CONTROL WORRYING: SEVERAL DAYS
1. FEELING NERVOUS, ANXIOUS, OR ON EDGE: MORE THAN HALF THE DAYS
3. WORRYING TOO MUCH ABOUT DIFFERENT THINGS: SEVERAL DAYS
IF YOU CHECKED OFF ANY PROBLEMS ON THIS QUESTIONNAIRE, HOW DIFFICULT HAVE THESE PROBLEMS MADE IT FOR YOU TO DO YOUR WORK, TAKE CARE OF THINGS AT HOME, OR GET ALONG WITH OTHER PEOPLE: SOMEWHAT DIFFICULT
5. BEING SO RESTLESS THAT IT IS HARD TO SIT STILL: NOT AT ALL
7. FEELING AFRAID AS IF SOMETHING AWFUL MIGHT HAPPEN: MORE THAN HALF THE DAYS

## 2020-09-16 ASSESSMENT — PATIENT HEALTH QUESTIONNAIRE - PHQ9
5. POOR APPETITE OR OVEREATING: SEVERAL DAYS
SUM OF ALL RESPONSES TO PHQ QUESTIONS 1-9: 8

## 2020-09-17 ASSESSMENT — ANXIETY QUESTIONNAIRES: GAD7 TOTAL SCORE: 9

## 2020-09-30 ENCOUNTER — VIRTUAL VISIT (OUTPATIENT)
Dept: FAMILY MEDICINE | Facility: CLINIC | Age: 30
End: 2020-09-30
Payer: COMMERCIAL

## 2020-09-30 DIAGNOSIS — F41.9 ANXIETY: ICD-10-CM

## 2020-09-30 PROCEDURE — 96127 BRIEF EMOTIONAL/BEHAV ASSMT: CPT | Mod: TEL | Performed by: NURSE PRACTITIONER

## 2020-09-30 PROCEDURE — 99213 OFFICE O/P EST LOW 20 MIN: CPT | Mod: TEL | Performed by: NURSE PRACTITIONER

## 2020-09-30 ASSESSMENT — ANXIETY QUESTIONNAIRES
7. FEELING AFRAID AS IF SOMETHING AWFUL MIGHT HAPPEN: SEVERAL DAYS
GAD7 TOTAL SCORE: 4
IF YOU CHECKED OFF ANY PROBLEMS ON THIS QUESTIONNAIRE, HOW DIFFICULT HAVE THESE PROBLEMS MADE IT FOR YOU TO DO YOUR WORK, TAKE CARE OF THINGS AT HOME, OR GET ALONG WITH OTHER PEOPLE: SOMEWHAT DIFFICULT
1. FEELING NERVOUS, ANXIOUS, OR ON EDGE: NOT AT ALL
5. BEING SO RESTLESS THAT IT IS HARD TO SIT STILL: NOT AT ALL
2. NOT BEING ABLE TO STOP OR CONTROL WORRYING: SEVERAL DAYS
6. BECOMING EASILY ANNOYED OR IRRITABLE: SEVERAL DAYS
3. WORRYING TOO MUCH ABOUT DIFFERENT THINGS: NOT AT ALL

## 2020-09-30 ASSESSMENT — PATIENT HEALTH QUESTIONNAIRE - PHQ9
SUM OF ALL RESPONSES TO PHQ QUESTIONS 1-9: 4
5. POOR APPETITE OR OVEREATING: SEVERAL DAYS

## 2020-09-30 NOTE — PATIENT INSTRUCTIONS
If you are in crisis, you can call the Crisis Connection Hotline 24/7 at [phone]  [facility] open 24/7 for anyone in crisis for assessment, evaluation and care: [phone]  If you are thinking of hurting yourself or someone else, you can also go to the emergency department or call [phone]

## 2020-09-30 NOTE — PROGRESS NOTES
"Corrina Beasley is a 30 year old female who is being evaluated via a billable telephone visit.      The patient has been notified of following:     \"This telephone visit will be conducted via a call between you and your physician/provider. We have found that certain health care needs can be provided without the need for a physical exam.  This service lets us provide the care you need with a short phone conversation.  If a prescription is necessary we can send it directly to your pharmacy.  If lab work is needed we can place an order for that and you can then stop by our lab to have the test done at a later time.    Telephone visits are billed at different rates depending on your insurance coverage. During this emergency period, for some insurers they may be billed the same as an in-person visit.  Please reach out to your insurance provider with any questions.    If during the course of the call the physician/provider feels a telephone visit is not appropriate, you will not be charged for this service.\"    Patient has given verbal consent for Telephone visit?  Yes    What phone number would you like to be contacted at?     How would you like to obtain your AVS? MyChart    Subjective     Corrina Beasley is a 30 year old female who presents via phone visit today for the following health issues:    HPI    Pleasant 30-year-old female initiated a virtual visit to follow-up on her anxiety and newly prescribed sertraline.  She reports she is been feeling much better since starting the sertraline.  No thoughts to harm self or others.  Feels safe.  Able to relax and enjoy herself much more.  She continues to work in healthcare.    PHQ 9/16/2020 9/30/2020   PHQ-9 Total Score 8 4   Q9: Thoughts of better off dead/self-harm past 2 weeks Not at all Not at all     AMRTHA-7 SCORE 9/16/2020 9/30/2020   Total Score 9 4           Review of Systems   Constitutional, HEENT, cardiovascular, pulmonary, gi and gu systems are negative, except as " otherwise noted.       Objective          Vitals:  No vitals were obtained today due to virtual visit.    healthy, alert and no distress  PSYCH: Alert and oriented times 3; coherent speech, normal   rate and volume, able to articulate logical thoughts, able   to abstract reason, no tangential thoughts, no hallucinations   or delusions  Her affect is normal  RESP: No cough, no audible wheezing, able to talk in full sentences  Remainder of exam unable to be completed due to telephone visits            Assessment/Plan:    Assessment & Plan     Anxiety  Doing well at current dose without side effects.  Refilled.  Supportive care as discussed.  Follow-up in 6 months.  - sertraline (ZOLOFT) 50 MG tablet; Take 1 tablet (50 mg) by mouth daily       See Patient Instructions    Return in about 6 months (around 3/30/2021).    ESSENCE Jansen [facility]    Phone call duration:  6 minutes (8:17-8:23)  Attempted video visit but due to delay in video/speaking converted to phone visit

## 2020-10-01 ASSESSMENT — ANXIETY QUESTIONNAIRES: GAD7 TOTAL SCORE: 4

## 2020-10-02 NOTE — PROGRESS NOTES
"Subjective         Corrina Beasley is a 29 year old female who presents to clinic today for the following health issues:     HPI   Work Comp    Onset: 12/17/19    Description:   Location: Right pinky finger  Character: throbbing    Intensity: mild    Progression of Symptoms: improving                  Accompanying Signs & Symptoms:  Other symptoms: radiation of pain to right hand, warm    History:   Previous similar pain: no       Precipitating factors:   Trauma or overuse: YES- Right pinky finger slammed between window when trying to close window, window came down and shut on finger    Alleviating factors:  Improved by: Ibuprofen     Therapies Tried and outcome: Ice right after injury, Ibuprofen        Pleasant 29 year old female presents with crush injury to right little finger that occurred 12/17/19. She works in a [employer] and was in a patient room. The window was open and she went to close it and it slammed shut on her finger. She is able to move it but it's quite painful. Left hand dominant. Not pregnant or breastfeeding.           Reviewed and updated as needed this visit by Provider        Review of Systems   ROS COMP: Constitutional, HEENT, cardiovascular, pulmonary, gi and gu systems are negative, except as otherwise noted.           Objective       /74 (BP Location: Right arm, Patient Position: Chair, Cuff Size: Adult Regular)   Pulse 57   Temp 98.4  F (36.9  C) (Oral)   Resp 18   Ht 1.549 m (5' 1\")   Wt 56.2 kg (124 lb)   LMP 12/04/2019 (Exact Date)   SpO2 98%   BMI 23.43 kg/m    Body mass index is 23.43 kg/m .  Physical Exam   GENERAL: healthy, alert and no distress  MS: left little finger with tenderness from DIP to tip of finger. Splint in place.  SKIN: minor abrasion healing well. No erythema or ecchymosis  PSYCH: mentation appears normal, affect normal/bright     Diagnostic Test Results:  Xray -      RIGHT FINGER TWO OR MORE VIEWS  12/17/2019 1:43 PM      HISTORY: Crush injury this morning - " window slammed on finger.  Crushing injury of right little finger, initial encounter.     COMPARISON: None.        IMPRESSION: Small ossicle adjacent to the tuft of the distal phalanx  of the small finger. This could be a subtle fracture. Otherwise  unremarkable.     ANTONIO BUTCHER MD              Assessment & Plan             ICD-10-CM     1. Crushing injury of right little finger, subsequent encounter S67.196A                        2. Encounter related to worker's compensation claim [email or web address]     Improving but still with some tenderness  Will have her follow up with ortho        See Patient Instructions     Ice 15 minutes 4 times daily  Elevate while resting  Splint x2-4 weeks  Tylenol 650-1000 mg every 6 hours as needed for pain  Follow up with orthopedics    The benefits, risks and potential side effects were discussed in detail. Black box warnings discussed as relevant. All patient questions were answered. The patient was instructed to follow up immediately if any adverse reactions develop.    Return precautions discussed, including when to seek urgent/emergent care.    Patient verbalizes understanding and agrees with plan of care. Patient stable for discharge.       ESSENCE Jansen [facility]        FE

## 2020-11-16 ENCOUNTER — HEALTH MAINTENANCE LETTER (OUTPATIENT)
Age: 30
End: 2020-11-16

## 2021-02-16 DIAGNOSIS — E03.9 ACQUIRED HYPOTHYROIDISM: ICD-10-CM

## 2021-02-16 RX ORDER — LEVOTHYROXINE SODIUM 88 UG/1
88 TABLET ORAL DAILY
Qty: 90 TABLET | Refills: 1 | Status: SHIPPED | OUTPATIENT
Start: 2021-02-16 | End: 2021-08-16

## 2021-02-16 NOTE — TELEPHONE ENCOUNTER
Prescription approved per [facility] Refill Protocol.  Sole Gaines RN

## 2021-03-27 DIAGNOSIS — F41.9 ANXIETY: ICD-10-CM

## 2021-04-03 ENCOUNTER — HEALTH MAINTENANCE LETTER (OUTPATIENT)
Age: 31
End: 2021-04-03

## 2021-04-19 ASSESSMENT — ENCOUNTER SYMPTOMS
DIZZINESS: 0
DYSURIA: 0
PALPITATIONS: 0
FEVER: 0
PARESTHESIAS: 0
EYE PAIN: 0
CHILLS: 0
ARTHRALGIAS: 0
WEAKNESS: 0
HEMATURIA: 0
CONSTIPATION: 0
NERVOUS/ANXIOUS: 0
SORE THROAT: 0
NAUSEA: 0
FREQUENCY: 0
HEADACHES: 0
JOINT SWELLING: 0
HEMATOCHEZIA: 0
BREAST MASS: 0
COUGH: 0
DIARRHEA: 0
MYALGIAS: 0
HEARTBURN: 0
ABDOMINAL PAIN: 0
SHORTNESS OF BREATH: 0

## 2021-04-20 ENCOUNTER — OFFICE VISIT (OUTPATIENT)
Dept: FAMILY MEDICINE | Facility: CLINIC | Age: 31
End: 2021-04-20
Payer: COMMERCIAL

## 2021-04-20 VITALS
DIASTOLIC BLOOD PRESSURE: 71 MMHG | TEMPERATURE: 96.8 F | OXYGEN SATURATION: 98 % | BODY MASS INDEX: 23.22 KG/M2 | HEIGHT: 61 IN | SYSTOLIC BLOOD PRESSURE: 110 MMHG | WEIGHT: 123 LBS | HEART RATE: 52 BPM

## 2021-04-20 DIAGNOSIS — Z11.3 ROUTINE SCREENING FOR STI (SEXUALLY TRANSMITTED INFECTION): ICD-10-CM

## 2021-04-20 DIAGNOSIS — Z13.6 CARDIOVASCULAR SCREENING; LDL GOAL LESS THAN 160: ICD-10-CM

## 2021-04-20 DIAGNOSIS — Z00.00 ROUTINE GENERAL MEDICAL EXAMINATION AT A HEALTH CARE FACILITY: Primary | ICD-10-CM

## 2021-04-20 DIAGNOSIS — Z13.1 SCREENING FOR DIABETES MELLITUS: ICD-10-CM

## 2021-04-20 LAB
CHOLEST SERPL-MCNC: 174 MG/DL
GLUCOSE SERPL-MCNC: 94 MG/DL (ref 70–99)
HDLC SERPL-MCNC: 60 MG/DL
LDLC SERPL CALC-MCNC: 94 MG/DL
NONHDLC SERPL-MCNC: 114 MG/DL
TRIGL SERPL-MCNC: 98 MG/DL

## 2021-04-20 PROCEDURE — 99395 PREV VISIT EST AGE 18-39: CPT | Performed by: NURSE PRACTITIONER

## 2021-04-20 PROCEDURE — 36415 COLL VENOUS BLD VENIPUNCTURE: CPT | Performed by: NURSE PRACTITIONER

## 2021-04-20 PROCEDURE — 82947 ASSAY GLUCOSE BLOOD QUANT: CPT | Performed by: NURSE PRACTITIONER

## 2021-04-20 PROCEDURE — 87591 N.GONORRHOEAE DNA AMP PROB: CPT | Performed by: NURSE PRACTITIONER

## 2021-04-20 PROCEDURE — 80061 LIPID PANEL: CPT | Performed by: NURSE PRACTITIONER

## 2021-04-20 PROCEDURE — 87491 CHLMYD TRACH DNA AMP PROBE: CPT | Performed by: NURSE PRACTITIONER

## 2021-04-20 ASSESSMENT — ENCOUNTER SYMPTOMS
CHILLS: 0
ARTHRALGIAS: 0
WEAKNESS: 0
MYALGIAS: 0
JOINT SWELLING: 0
HEARTBURN: 0
NERVOUS/ANXIOUS: 0
ABDOMINAL PAIN: 0
NAUSEA: 0
HEMATOCHEZIA: 0
EYE PAIN: 0
DIARRHEA: 0
HEADACHES: 0
BREAST MASS: 0
SHORTNESS OF BREATH: 0
CONSTIPATION: 0
HEMATURIA: 0
DYSURIA: 0
DIZZINESS: 0
PARESTHESIAS: 0
COUGH: 0
PALPITATIONS: 0
SORE THROAT: 0
FREQUENCY: 0
FEVER: 0

## 2021-04-20 ASSESSMENT — PAIN SCALES - GENERAL: PAINLEVEL: MILD PAIN (3)

## 2021-04-20 ASSESSMENT — MIFFLIN-ST. JEOR: SCORE: 1210.3

## 2021-04-20 NOTE — PROGRESS NOTES
SUBJECTIVE:   CC: Corrina Beasley is an 30 year old woman who presents for preventive health visit.       Patient has been advised of split billing requirements and indicates understanding: Yes  Healthy Habits:     Getting at least 3 servings of Calcium per day:  Yes    Bi-annual eye exam:  NO    Dental care twice a year:  Yes    Sleep apnea or symptoms of sleep apnea:  None    Diet:  Regular (no restrictions)    Frequency of exercise:  4-5 days/week    Duration of exercise:  30-45 minutes    Taking medications regularly:  Yes    Barriers to taking medications:  None    Medication side effects:  None    PHQ-2 Total Score: 1    Additional concerns today:  Yes          Today's PHQ-2 Score:   PHQ-2 ( 1999 Pfizer) 4/19/2021   Q1: Little interest or pleasure in doing things 0   Q2: Feeling down, depressed or hopeless 1   PHQ-2 Score 1   Q1: Little interest or pleasure in doing things Not at all   Q2: Feeling down, depressed or hopeless Several days   PHQ-2 Score 1       Abuse: Current or Past (Physical, Sexual or Emotional) - No  Do you feel safe in your environment? Yes    Have you ever done Advance Care Planning? (For example, a Health Directive, POLST, or a discussion with a medical provider or your loved ones about your wishes): No, advance care planning information given to patient to review.  Patient plans to discuss their wishes with loved ones or provider.      Social History     Tobacco Use     Smoking status: Never Smoker     Smokeless tobacco: Never Used   Substance Use Topics     Alcohol use: Yes     Comment: 2 drinks or less per week     If you drink alcohol do you typically have >3 drinks per day or >7 drinks per week? No    Alcohol Use 4/19/2021   Prescreen: >3 drinks/day or >7 drinks/week? No   No flowsheet data found.    Reviewed orders with patient.  Reviewed health maintenance and updated orders accordingly - Yes  Lab work is in process  Labs reviewed in EPIC  BP Readings from Last 3 Encounters:    04/20/21 110/71   09/09/20 124/76   01/28/20 119/72    Wt Readings from Last 3 Encounters:   04/20/21 55.8 kg (123 lb)   09/09/20 59.4 kg (131 lb)   01/28/20 56.7 kg (125 lb)                  Patient Active Problem List   Diagnosis     Acquired hypothyroidism     CARDIOVASCULAR SCREENING; LDL GOAL LESS THAN 160     Past Surgical History:   Procedure Laterality Date     AS MYOMECTOMY 5/>,TOT>250 GMS,ABD APPRCH  02/11/2016     HC TOOTH EXTRACTION W/FORCEP  05/2009       Social History     Tobacco Use     Smoking status: Never Smoker     Smokeless tobacco: Never Used   Substance Use Topics     Alcohol use: Yes     Comment: 2 drinks or less per week     Family History   Problem Relation Age of Onset     Hypertension Father      Alcoholism Father      Hepatitis Father         C     Cancer Maternal Grandfather      Hypothyroidism Paternal Grandmother      Cancer Paternal Grandfather      Hypothyroidism Paternal Aunt          Current Outpatient Medications   Medication Sig Dispense Refill     CALCIUM-VITAMIN D PO        levothyroxine (SYNTHROID/LEVOTHROID) 88 MCG tablet Take 1 tablet (88 mcg) by mouth daily 90 tablet 1     Multiple Vitamin (MULTIVITAMIN PO)        sertraline (ZOLOFT) 50 MG tablet Take 1 tablet (50 mg) by mouth daily 90 tablet 1     Allergies   Allergen Reactions     Seasonal Allergies        Breast Cancer Screening:    FSH-7:   Breast CA Risk Assessment (FHS-7) 4/19/2021   Did any of your first-degree relatives have breast or ovarian cancer? No   Did any of your relatives have bilateral breast cancer? No   Did any man in your family have breast cancer? No   Did any woman in your family have breast and ovarian cancer? No   Did any woman in your family have breast cancer before age 50 y? No   Do you have 2 or more relatives with breast and/or ovarian cancer? Yes   Do you have 2 or more relatives with breast and/or bowel cancer? Yes       Patient under 40 years of age: Routine Mammogram Screening not  "recommended.   Pertinent mammograms are reviewed under the imaging tab.    History of abnormal Pap smear: NO - age 30-65 PAP every 5 years with negative HPV co-testing recommended  PAP / HPV Latest Ref Rng & Units 12/12/2019   PAP - NIL   HPV 16 DNA NEG:Negative Negative   HPV 18 DNA NEG:Negative Negative   OTHER HR HPV NEG:Negative Negative     Reviewed and updated as needed this visit by clinical staff  Tobacco  Allergies  Meds   Med Hx  Surg Hx  Fam Hx  Soc Hx        Reviewed and updated as needed this visit by Provider                History reviewed. No pertinent past medical history.   Past Surgical History:   Procedure Laterality Date     AS MYOMECTOMY 5/>,TOT>250 GMS,ABD APPRCH  02/11/2016     HC TOOTH EXTRACTION W/FORCEP  05/2009       Review of Systems   Constitutional: Negative for chills and fever.   HENT: Negative for congestion, ear pain, hearing loss and sore throat.    Eyes: Negative for pain and visual disturbance.   Respiratory: Negative for cough and shortness of breath.    Cardiovascular: Negative for chest pain, palpitations and peripheral edema.   Gastrointestinal: Negative for abdominal pain, constipation, diarrhea, heartburn, hematochezia and nausea.   Breasts:  Negative for tenderness, breast mass and discharge.   Genitourinary: Negative for dysuria, frequency, genital sores, hematuria, pelvic pain, urgency, vaginal bleeding and vaginal discharge.   Musculoskeletal: Negative for arthralgias, joint swelling and myalgias.   Skin: Negative for rash.   Neurological: Negative for dizziness, weakness, headaches and paresthesias.   Psychiatric/Behavioral: Negative for mood changes. The patient is not nervous/anxious.           OBJECTIVE:   /71 (BP Location: Left arm, Patient Position: Chair, Cuff Size: Adult Regular)   Pulse 52   Temp 96.8  F (36  C) (Tympanic)   Ht 1.549 m (5' 1\")   Wt 55.8 kg (123 lb)   LMP 03/31/2021   SpO2 98%   Breastfeeding No   BMI 23.24 kg/m    Physical " "Exam  GENERAL: healthy, alert and no distress  EYES: Eyes grossly normal to inspection, PERRL and conjunctivae and sclerae normal  HENT: ear canals and TM's normal, nose and mouth without ulcers or lesions  NECK: no adenopathy, no asymmetry, masses, or scars and thyroid normal to palpation  RESP: lungs clear to auscultation - no rales, rhonchi or wheezes  BREAST: normal without masses, tenderness or nipple discharge and no palpable axillary masses or adenopathy  CV: regular rate and rhythm, normal S1 S2, no S3 or S4, no murmur, click or rub, no peripheral edema and peripheral pulses strong  ABDOMEN: soft, nontender, no hepatosplenomegaly, no masses and bowel sounds normal  MS: no gross musculoskeletal defects noted, no edema  SKIN: no suspicious lesions or rashes  NEURO: Normal strength and tone, mentation intact and speech normal  PSYCH: mentation appears normal, affect normal/bright    Diagnostic Test Results:  Labs reviewed in Epic  No results found for this or any previous visit (from the past 24 hour(s)).    ASSESSMENT/PLAN:       ICD-10-CM    1. Routine general medical examination at a health care facility  Z00.00    2. Routine screening for STI (sexually transmitted infection)  Z11.3 Chlamydia trachomatis PCR     Neisseria gonorrhoeae PCR   3. CARDIOVASCULAR SCREENING; LDL GOAL LESS THAN 160  Z13.6 Lipid panel reflex to direct LDL Fasting   4. Screening for diabetes mellitus  Z13.1 Glucose       Patient has been advised of split billing requirements and indicates understanding: Yes  COUNSELING:  Reviewed preventive health counseling, as reflected in patient instructions       Regular exercise       Healthy diet/nutrition    Estimated body mass index is 23.24 kg/m  as calculated from the following:    Height as of this encounter: 1.549 m (5' 1\").    Weight as of this encounter: 55.8 kg (123 lb).        She reports that she has never smoked. She has never used smokeless tobacco.      Counseling Resources:  ATP " IV Guidelines  Pooled Cohorts Equation Calculator  Breast Cancer Risk Calculator  BRCA-Related Cancer Risk Assessment: FHS-7 Tool  FRAX Risk Assessment  ICSI Preventive Guidelines  Dietary Guidelines for Americans, 2010  USDA's MyPlate  ASA Prophylaxis  Lung CA Screening    ESSENCE Jansen CNP [facility]

## 2021-04-20 NOTE — PATIENT INSTRUCTIONS
At [facility], we strive to deliver an exceptional experience to you, every time we see you. If you receive a survey, please complete it as we do value your feedback.  If you have MyChart, you can expect to receive results automatically within 24 hours of their completion.  Your provider will send a note interpreting your results as well.   If you do not have MyChart, you should receive your results in about a week by mail.    Your care team:                            Family Medicine Internal Medicine   MD Ramon Hollis MD Shantel Branch-Fleming, MD Srinivasa Vaka, MD Katya Belousova, PAFelicitaC  Sonya Mckenzie, APRN CNP    Izaiah Josue, MD Pediatrics   Stone Espinoza, PAAGNIESZKA Oconnell, CNP MD Enedelia Peng APRN CNP   MD Bonnie Grijalva MD Deborah Mielke, MD Marta Vaca, APRN [facility]      Clinic hours: Monday - Thursday 7 am-6 pm; Fridays 7 am-5 pm.   Urgent care: Monday - Friday 10 am- 8 pm; Saturday and Sunday 9 am-5 pm.    Clinic: [phone]       [facility]: Monday - Thursday 8 am - 7 pm; Friday 8 am - 6 pm  [facility]: [phone]     Use [email or web address] for 24/7 diagnosis and treatment of dozens of conditions.  Preventive Health Recommendations  Female Ages 26 - 39  Yearly exam:   See your health care provider every year in order to    Review health changes.     Discuss preventive care.      Review your medicines if you your doctor has prescribed any.    Until age 30: Get a Pap test every three years (more often if you have had an abnormal result).    After age 30: Talk to your doctor about whether you should have a Pap test every 3 years or have a Pap test with HPV screening every 5 years.   You do not need a Pap test if your uterus was removed (hysterectomy) and you have not had cancer.  You should be tested each year for STDs (sexually transmitted diseases), if  you're at risk.   Talk to your provider about how often to have your cholesterol checked.  If you are at risk for diabetes, you should have a diabetes test (fasting glucose).  Shots: Get a flu shot each year. Get a tetanus shot every 10 years.   Nutrition:     Eat at least 5 servings of fruits and vegetables each day.    Eat whole-grain bread, whole-wheat pasta and brown rice instead of white grains and rice.    Get adequate Calcium and Vitamin D.     Lifestyle    Exercise at least 150 minutes a week (30 minutes a day, 5 days of the week). This will help you control your weight and prevent disease.    Limit alcohol to one drink per day.    No smoking.     Wear sunscreen to prevent skin cancer.    See your dentist every six months for an exam and cleaning.

## 2021-05-07 ENCOUNTER — VIRTUAL VISIT (OUTPATIENT)
Dept: FAMILY MEDICINE | Facility: CLINIC | Age: 31
End: 2021-05-07
Payer: COMMERCIAL

## 2021-05-07 DIAGNOSIS — R09.81 NASAL CONGESTION: ICD-10-CM

## 2021-05-07 DIAGNOSIS — R07.0 THROAT PAIN: Primary | ICD-10-CM

## 2021-05-07 DIAGNOSIS — R53.83 FATIGUE, UNSPECIFIED TYPE: ICD-10-CM

## 2021-05-07 DIAGNOSIS — R07.0 THROAT PAIN: ICD-10-CM

## 2021-05-07 LAB
DEPRECATED S PYO AG THROAT QL EIA: NEGATIVE
LABORATORY COMMENT REPORT: NORMAL
SARS-COV-2 RNA RESP QL NAA+PROBE: NEGATIVE
SARS-COV-2 RNA RESP QL NAA+PROBE: NORMAL
SPECIMEN SOURCE: NORMAL
STREP GROUP A PCR: NOT DETECTED

## 2021-05-07 PROCEDURE — U0005 INFEC AGEN DETEC AMPLI PROBE: HCPCS | Performed by: NURSE PRACTITIONER

## 2021-05-07 PROCEDURE — 99N1174 PR STATISTIC STREP A RAPID: Performed by: NURSE PRACTITIONER

## 2021-05-07 PROCEDURE — U0003 INFECTIOUS AGENT DETECTION BY NUCLEIC ACID (DNA OR RNA); SEVERE ACUTE RESPIRATORY SYNDROME CORONAVIRUS 2 (SARS-COV-2) (CORONAVIRUS DISEASE [COVID-19]), AMPLIFIED PROBE TECHNIQUE, MAKING USE OF HIGH THROUGHPUT TECHNOLOGIES AS DESCRIBED BY CMS-2020-01-R: HCPCS | Performed by: NURSE PRACTITIONER

## 2021-05-07 PROCEDURE — 99213 OFFICE O/P EST LOW 20 MIN: CPT | Mod: 95 | Performed by: NURSE PRACTITIONER

## 2021-05-07 PROCEDURE — 87651 STREP A DNA AMP PROBE: CPT | Performed by: NURSE PRACTITIONER

## 2021-05-07 NOTE — PATIENT INSTRUCTIONS
"Instructions for Patients    -Claritin or Zyrtec without Sudafed in it.    -Can also try over the counter FLonase nasal spray- two sprays each side daily.  Will take 4-5 days to note difference but can be really helpful with allergy symptoms overall  -contact me next week via Innovegahart if symptoms persist and we can see if antibiotics are warranted for a sinus infection  -contact me if letter is needed for work    Patient Education   After Your COVID-19 (Coronavirus) Test  You have been tested for COVID-19 (coronavirus).   If you'll have surgery in the next few days, we'll let you know ahead of time if you have the virus. Please call your surgeon's office with any questions.  For all other patients: Results are usually available in Fe[employer]o within 2 to 3 days.   If you do not have a FeZo account, you'll get a letter in the mail in about 7 to 10 days.   Xirrushart is often the fastest way to get test results. Please sign up if you do not already have a FeZo account. See the handout Getting COVID-19 Test Results in FeZo for help.  What if my test result is positive?  If your test is positive and you have not viewed your result in FeZo, you'll get a phone call with your result. (A positive test means that you have the virus.)     Follow the tips under \"How do I self-isolate?\" below for 10 days (20 days if you have a weak immune system).    You don't need to be retested for COVID-19 before going back to school or work. As long as you're fever-free and feeling better, you can go back to school, work and other activities after waiting the 10 or 20 days.  What if I have questions after I get my results?  If you have questions about your results, please visit our testing website at [email or web address].   After 7 to 10 days, if you have not gotten your results:     Call [phone] ([phone]) and ask to speak with our COVID-19 results team.    If you're being treated at an " "infusion center: Call your infusion center directly.  What are the symptoms of COVID-19?  Cough, fever and trouble breathing are the most common signs of COVID-19.  Other symptoms can include new headaches, new muscle or body aches, new and unexplained fatigue (feeling very tired), chills, sore throat, congestion (stuffy or runny nose), diarrhea (loose poop), loss of taste or smell, belly pain, and nausea or vomiting (feeling sick to your stomach or throwing up).  You may already have symptoms of COVID-19, or they may show up later.  What should I do if I have symptoms?  If you're having surgery: Call your surgeon's office.  For all other patients: Stay home and away from others (self-isolate) until ...    You've had no fever--and no medicine that reduces fever--for 1 full day (24 hours), AND    Other symptoms have gotten better. For example, your cough or breathing has improved, AND    At least 10 days have passed since your symptoms first started.  How do I self-isolate?    Stay in your own room, even for meals. Use your own bathroom if you can.    Stay away from others in your home. No hugging, kissing or shaking hands. No visitors.    Don't go to work, school or anywhere else.    Clean \"high touch\" surfaces often (doorknobs, counters, handles). Use household cleaning spray or wipes. You'll find a full list of  on the EPA website: www.epa.gov/pesticide-registration/list-n-disinfectants-use-against-sars-cov-2.    Cover your mouth and nose with a mask or other face covering to avoid spreading germs.    Wash your hands and face often. Use soap and water.    Caregivers in these groups are at risk for severe illness due to COVID-19:  ? People 65 years and older  ? People who live in a nursing home or long-term care facility  ? People with chronic disease (lung, heart, cancer, diabetes, kidney, liver, immunologic)  ? People who have a weakened immune system, including those who:    Are in cancer " treatment    Take medicine that weakens the immune system, such as corticosteroids    Had a bone marrow or organ transplant    Have an immune deficiency    Have poorly controlled HIV or AIDS    Are obese (body mass index of 40 or higher)    Smoke regularly    Caregivers should wear gloves while washing dishes, handling laundry and cleaning bedrooms and bathrooms.    Use caution when washing and drying laundry: Don't shake dirty laundry and use the warmest water setting that you can.    For more tips on managing your health at home, go to www.cdc.gov/coronavirus/2019-ncov/downloads/10Things.pdf.  How can I take care of myself at home?  1. Get lots of rest. Drink extra fluids (unless a doctor has told you not to).  2. Take Tylenol (acetaminophen) for fever or pain. If you have liver or kidney problems, ask your family doctor if it's OK to take Tylenol.   Adults can take either:  ? 650 mg (two 325 mg pills) every 4 to 6 hours, or   ? 1,000 mg (two 500 mg pills) every 8 hours as needed.  ? Note: Don't take more than 3,000 mg in one day. Acetaminophen is found in many medicines (both prescribed and over-the-counter medicines). Read all labels to be sure you don't take too much.   For children, check the Tylenol bottle for the right dose. The dose is based on the child's age or weight.  3. If you have other health problems (like cancer, heart failure, an organ transplant or severe kidney disease): Call your specialty clinic if you don't feel better in the next 2 days.  4. Know when to call 911. Emergency warning signs include:  ? Trouble breathing or shortness of breath  ? Chest pain or pressure that doesn't go away  ? Feeling confused like you haven't felt before, or not being able to wake up  ? Bluish-colored lips or face  5. If your doctor prescribed a blood thinner medicine: Follow their instructions.  Where can I get more information?     [employer] [facility] - About COVID-19:   [email or web address]    CDC -  If You're Sick: cdc.gov/coronavirus/2019-ncov/about/steps-when-sick.html    CDC - Ending Home Isolation: www.cdc.gov/coronavirus/2019-ncov/hcp/disposition-in-home-patients.html    CDC - Caring for Someone: www.cdc.gov/coronavirus/2019-ncov/if-you-are-sick/care-for-someone.html    [facility] - Interim Guidance for Hospital Discharge to Home: www.[facility].mn./diseases/coronavirus/hcp/hospdischarge.pdf    [facility] clinical trials (COVID-19 research studies): [email or web address].[email or web address]-clinical-trials    Below are the COVID-19 hotlines at the [place] Department of Health ([facility]). Interpreters are available.  ? For health questions: Call 992-291-3824 or 1-674.866.1495 (7 a.m. to 7 p.m.)  ? For questions about schools and childcare: Call 571-479-2065 or 1-142.737.7791 (7 a.m. to 7 p.m.)    For informational purposes only. Not to replace the advice of your health care provider. Clinically reviewed by Infection Prevention and the [facility] COVID-19 Clinical Team. Copyright   2020 [facility]. All rights reserved. [employer] 371149 - Rev 11/11/20.             Thank you for taking steps to prevent the spread of this virus.  o Limit your contact with others.  o Wear a simple mask to cover your cough.  o Wash your hands well and often.  o If you need medical care, go to [email or web address] or contact your health care provider.     For more about COVID-19 and caring for yourself at home, visit the CDC website at https://www.cdc.gov/coronavirus/2019-ncov/about/steps-when-sick.html.     To learn about care at [facility], please go to [email or web address].     [facility] clinical trials (COVID-19 research studies): clinicalaffairs.[facility]-clinical-trials.    Below are the COVID-19 hotlines at the [facility] ([facility]). Interpreters are available.     For health questions: Call 047-317-5062 or 1-850.344.9683 (7 a.m. to 7  p.m.)    For questions about schools and childcare: Call 443-275-3972 or 1-646.367.5449 (7 a.m. to 7 p.m.)

## 2021-05-07 NOTE — PROGRESS NOTES
Corrina is a 30 year old who is being evaluated via a billable video visit.      How would you like to obtain your AVS? MyChart  If the video visit is dropped, the invitation should be resent by: Text to cell phone:    Will anyone else be joining your video visit? No      Video Start Time: 8:01 AM    Assessment & Plan     Throat pain  Contact me next week if symptoms do not improve and we can discuss treatment for sinusitis.  Did discuss treatment for allergies.  - Symptomatic COVID-19 Virus (Coronavirus) by PCR; Future  - Streptococcus A Rapid Scr w Reflx to PCR; Future    Fatigue, unspecified type  - Symptomatic COVID-19 Virus (Coronavirus) by PCR; Future  - Streptococcus A Rapid Scr w Reflx to PCR; Future    Nasal congestion  - Symptomatic COVID-19 Virus (Coronavirus) by PCR; Future    Ordering of each unique test  15 minutes spent on the date of the encounter doing chart review, history and exam, documentation and further activities per the note       See Patient Instructions    No follow-ups on file.    ESSENCE Salcido CNP  [facility]   Corrina is a 30 year old who presents for the following health issues     HPI     Acute Illness  Acute illness concerns: sore throat, fatigue, congestion  Onset/Duration: 6 days with fatigue.  Has had allergies in past but sore throat worsened today and these symptoms fee a bit different than allergies.  Symptoms:  Fever: no  Chills/Sweats: no  Headache (location?): YES- mild, intermittent  Sinus Pressure: YES  Conjunctivitis:  no  Ear Pain: no  Rhinorrhea: YES  Congestion: YES  Sore Throat: YES  Cough: no  Wheeze: no  Decreased Appetite: no  Nausea: no  Vomiting: no  Diarrhea: no  Dysuria/Freq.: no  Dysuria or Hematuria: no  Fatigue/Achiness: YES- fatigued  Sick/Strep Exposure: girlfriend sick with same; neg covid, neg strep  Therapies tried and outcome: None      Review of Systems   Constitutional, HEENT, cardiovascular,  pulmonary, gi and gu systems are negative, except as otherwise noted.      Objective           Vitals:  No vitals were obtained today due to virtual visit.    Physical Exam   GENERAL: alert, no distress and fatigued  EYES: Eyes grossly normal to inspection.  No discharge or erythema, or obvious scleral/conjunctival abnormalities.  RESP: No audible wheeze, cough, or visible cyanosis.  No visible retractions or increased work of breathing.    SKIN: Visible skin clear. No significant rash, abnormal pigmentation or lesions.  NEURO: Cranial nerves grossly intact.  Mentation and speech appropriate for age.  PSYCH: Mentation appears normal, affect normal/bright, judgement and insight intact, normal speech and appearance well-groomed.    No results found for this or any previous visit (from the past 24 hour(s)).            Video-Visit Details    Type of service:  Video Visit    Video End Time:8:16 AM    Originating Location (pt. Location): Home    Distant Location (provider location):  [facility]     Platform used for Video Visit: [employer]

## 2021-06-10 ENCOUNTER — OFFICE VISIT (OUTPATIENT)
Dept: FAMILY MEDICINE | Facility: CLINIC | Age: 31
End: 2021-06-10
Payer: COMMERCIAL

## 2021-06-10 VITALS
RESPIRATION RATE: 16 BRPM | SYSTOLIC BLOOD PRESSURE: 120 MMHG | BODY MASS INDEX: 24.32 KG/M2 | HEIGHT: 61 IN | TEMPERATURE: 97 F | OXYGEN SATURATION: 98 % | HEART RATE: 59 BPM | WEIGHT: 128.8 LBS | DIASTOLIC BLOOD PRESSURE: 77 MMHG

## 2021-06-10 DIAGNOSIS — G25.3 MYOCLONIC JERKING: Primary | ICD-10-CM

## 2021-06-10 DIAGNOSIS — G25.3 MYOCLONIC JERKING: ICD-10-CM

## 2021-06-10 LAB
ALBUMIN SERPL-MCNC: 4.4 G/DL (ref 3.4–5)
ALBUMIN UR-MCNC: NEGATIVE MG/DL
ALP SERPL-CCNC: 52 U/L (ref 40–150)
ALT SERPL W P-5'-P-CCNC: 23 U/L (ref 0–50)
ANION GAP SERPL CALCULATED.3IONS-SCNC: 5 MMOL/L (ref 3–14)
APPEARANCE UR: CLEAR
AST SERPL W P-5'-P-CCNC: 19 U/L (ref 0–45)
BASOPHILS # BLD AUTO: 0 10E9/L (ref 0–0.2)
BASOPHILS NFR BLD AUTO: 0.4 %
BILIRUB SERPL-MCNC: 0.5 MG/DL (ref 0.2–1.3)
BILIRUB UR QL STRIP: NEGATIVE
BUN SERPL-MCNC: 11 MG/DL (ref 7–30)
CALCIUM SERPL-MCNC: 9.2 MG/DL (ref 8.5–10.1)
CHLORIDE SERPL-SCNC: 108 MMOL/L (ref 94–109)
CO2 SERPL-SCNC: 27 MMOL/L (ref 20–32)
COLOR UR AUTO: YELLOW
CREAT SERPL-MCNC: 0.75 MG/DL (ref 0.52–1.04)
DIFFERENTIAL METHOD BLD: NORMAL
EOSINOPHIL # BLD AUTO: 0.1 10E9/L (ref 0–0.7)
EOSINOPHIL NFR BLD AUTO: 1.9 %
ERYTHROCYTE [DISTWIDTH] IN BLOOD BY AUTOMATED COUNT: 11.9 % (ref 10–15)
GFR SERPL CREATININE-BSD FRML MDRD: >90 ML/MIN/{1.73_M2}
GLUCOSE SERPL-MCNC: 84 MG/DL (ref 70–99)
GLUCOSE UR STRIP-MCNC: NEGATIVE MG/DL
HCT VFR BLD AUTO: 37.4 % (ref 35–47)
HGB BLD-MCNC: 12.6 G/DL (ref 11.7–15.7)
HGB UR QL STRIP: NEGATIVE
IMM GRANULOCYTES # BLD: 0 10E9/L (ref 0–0.4)
IMM GRANULOCYTES NFR BLD: 0.2 %
KETONES UR STRIP-MCNC: NEGATIVE MG/DL
LEUKOCYTE ESTERASE UR QL STRIP: NEGATIVE
LYMPHOCYTES # BLD AUTO: 2.6 10E9/L (ref 0.8–5.3)
LYMPHOCYTES NFR BLD AUTO: 49.6 %
MAGNESIUM SERPL-MCNC: 1.9 MG/DL (ref 1.6–2.3)
MCH RBC QN AUTO: 30.9 PG (ref 26.5–33)
MCHC RBC AUTO-ENTMCNC: 33.7 G/DL (ref 31.5–36.5)
MCV RBC AUTO: 92 FL (ref 78–100)
MONOCYTES # BLD AUTO: 0.4 10E9/L (ref 0–1.3)
MONOCYTES NFR BLD AUTO: 7.7 %
NEUTROPHILS # BLD AUTO: 2.1 10E9/L (ref 1.6–8.3)
NEUTROPHILS NFR BLD AUTO: 40.2 %
NITRATE UR QL: NEGATIVE
NRBC # BLD AUTO: 0 10*3/UL
NRBC BLD AUTO-RTO: 0 /100
PH UR STRIP: 6 PH (ref 5–7)
PLATELET # BLD AUTO: 220 10E9/L (ref 150–450)
POTASSIUM SERPL-SCNC: 3.6 MMOL/L (ref 3.4–5.3)
PROT SERPL-MCNC: 8 G/DL (ref 6.8–8.8)
RBC # BLD AUTO: 4.08 10E12/L (ref 3.8–5.2)
SODIUM SERPL-SCNC: 140 MMOL/L (ref 133–144)
SOURCE: NORMAL
SP GR UR STRIP: 1.01 (ref 1–1.03)
T4 FREE SERPL-MCNC: 0.87 NG/DL (ref 0.76–1.46)
TSH SERPL DL<=0.005 MIU/L-ACNC: 6.4 MU/L (ref 0.4–4)
UROBILINOGEN UR STRIP-MCNC: 0 MG/DL (ref 0–2)
WBC # BLD AUTO: 5.2 10E9/L (ref 4–11)

## 2021-06-10 PROCEDURE — 83735 ASSAY OF MAGNESIUM: CPT | Performed by: PATHOLOGY

## 2021-06-10 PROCEDURE — 80050 GENERAL HEALTH PANEL: CPT | Performed by: PATHOLOGY

## 2021-06-10 PROCEDURE — 36415 COLL VENOUS BLD VENIPUNCTURE: CPT | Performed by: PATHOLOGY

## 2021-06-10 PROCEDURE — 81003 URINALYSIS AUTO W/O SCOPE: CPT | Performed by: PATHOLOGY

## 2021-06-10 PROCEDURE — 99213 OFFICE O/P EST LOW 20 MIN: CPT | Performed by: NURSE PRACTITIONER

## 2021-06-10 PROCEDURE — 84439 ASSAY OF FREE THYROXINE: CPT | Performed by: PATHOLOGY

## 2021-06-10 ASSESSMENT — MIFFLIN-ST. JEOR: SCORE: 1236.35

## 2021-06-10 ASSESSMENT — PAIN SCALES - GENERAL: PAINLEVEL: MILD PAIN (3)

## 2021-06-10 NOTE — PROGRESS NOTES
Assessment & Plan     Myoclonic jerking  Will get labs and refer to neurology. Patient has to leave for work so she will have then drawn later today. Emergency department precautions discussed.  - NEUROLOGY ADULT REFERRAL  - CBC with platelets differential; Future  - Comprehensive metabolic panel (BMP + Alb, Alk Phos, ALT, AST, Total. Bili, TP); Future  - Magnesium; Future  - TSH with free T4 reflex; Future  - UA reflex to Microscopic and Culture; Future         See Patient Instructions    Return in about 1 week (around 6/17/2021), or if symptoms worsen or fail to improve.     Return precautions discussed, including when to seek urgent/emergent care.    Patient verbalizes understanding and agrees with plan of care. Patient stable for discharge.      ESSENCE Jansen CNP  [facility] Houser is a 30 year old who presents for the following health issues     HPI     Concern - twitching  Onset: 1 week   Description: twitching movement from pelvis to feet- intermittent   Intensity: mild  Progression of Symptoms:  same and intermittent  Accompanying Signs & Symptoms: stress and emotions   Previous history of similar problem: none  Precipitating factors:        Worsened by: stree and emotions   Alleviating factors:        Improved by: none  Therapies tried and outcome:  none     Started EMDR on Memorial Day to work through trauma  Has done it before for other stuff and got tired and emotional and that happened again  Got very anxious in the afternoon  Twitching happened after that  No diet changes  Occasional alcohol  No drug use  No new medications  Has been stable on levothyroxine  No headaches  Not pain  No nausea or vomiting  Doesn't wake her from sleep      Review of Systems   Constitutional, HEENT, cardiovascular, pulmonary, gi and gu systems are negative, except as otherwise noted.      Objective    /77   Pulse 59   Temp 97  F (36.1  C) (Temporal)   Resp 16    "Ht 1.549 m (5' 0.98\")   Wt 58.4 kg (128 lb 12.8 oz)   LMP 06/01/2021   SpO2 98%   BMI 24.35 kg/m    Body mass index is 24.35 kg/m .  Physical Exam   GENERAL: healthy, alert and no distress  RESP: lungs clear to auscultation - no rales, rhonchi or wheezes  CV: regular rate and rhythm, normal S1 S2, no S3 or S4, no murmur, click or rub, no peripheral edema and peripheral pulses strong  ABDOMEN: soft, nontender, no hepatosplenomegaly, no masses and bowel sounds normal  MS: no gross musculoskeletal defects noted, no edema  SKIN: no suspicious lesions or rashes  NEURO: Normal strength and tone, mentation intact and speech normal. Some hyperreflexia to lower extremities   PSYCH: mentation appears normal, affect normal/bright    No results found for this or any previous visit (from the past 24 hour(s)).            "

## 2021-08-09 NOTE — TELEPHONE ENCOUNTER
FUTURE VISIT INFORMATION      FUTURE VISIT INFORMATION:    Date: 9/1/2021    Time: 7am    Location: [facility] [place]  REFERRAL INFORMATION:    Referring provider:  THEODORA Mckenzie    Referring providers clinic:  [facility]    Reason for visit/diagnosis  Myoclonic Jerking    RECORDS REQUESTED FROM:       Clinic name Comments Records Status Imaging Status   Internal THEODORA Mckenzie-6/10/2021 Epic N/A

## 2021-08-10 ENCOUNTER — LAB (OUTPATIENT)
Dept: LAB | Facility: CLINIC | Age: 31
End: 2021-08-10
Payer: COMMERCIAL

## 2021-08-10 DIAGNOSIS — E03.9 ACQUIRED HYPOTHYROIDISM: ICD-10-CM

## 2021-08-10 LAB — TSH SERPL DL<=0.005 MIU/L-ACNC: 2.56 MU/L (ref 0.4–4)

## 2021-08-10 PROCEDURE — 36415 COLL VENOUS BLD VENIPUNCTURE: CPT | Performed by: PATHOLOGY

## 2021-08-10 PROCEDURE — 84443 ASSAY THYROID STIM HORMONE: CPT | Performed by: PATHOLOGY

## 2021-08-15 DIAGNOSIS — E03.9 ACQUIRED HYPOTHYROIDISM: ICD-10-CM

## 2021-08-16 RX ORDER — LEVOTHYROXINE SODIUM 88 UG/1
88 TABLET ORAL DAILY
Qty: 90 TABLET | Refills: 3 | Status: SHIPPED | OUTPATIENT
Start: 2021-08-16 | End: 2022-05-03

## 2021-08-16 NOTE — TELEPHONE ENCOUNTER
Routing refill request to provider for review/approval because:  Labs out of range:  tsh  Enedina Valadez BSN, RN

## 2021-08-31 ASSESSMENT — ENCOUNTER SYMPTOMS
SORE THROAT: 1
SINUS PAIN: 0
SMELL DISTURBANCE: 0
TINGLING: 0
SINUS CONGESTION: 0
LOSS OF CONSCIOUSNESS: 0
BACK PAIN: 1
HEADACHES: 0
MYALGIAS: 0
STIFFNESS: 0
EYE WATERING: 0
TASTE DISTURBANCE: 0
DECREASED LIBIDO: 0
MUSCLE CRAMPS: 0
DISTURBANCES IN COORDINATION: 0
ARTHRALGIAS: 0
HOT FLASHES: 0
DOUBLE VISION: 0
NECK PAIN: 1
JOINT SWELLING: 0
DIZZINESS: 1
NECK MASS: 0
EYE PAIN: 0
TROUBLE SWALLOWING: 0
TREMORS: 1
PARALYSIS: 0
NUMBNESS: 0
EYE IRRITATION: 0
HOARSE VOICE: 0
EYE REDNESS: 0
WEAKNESS: 0
SPEECH CHANGE: 0
MEMORY LOSS: 0
SEIZURES: 0
MUSCLE WEAKNESS: 0

## 2021-09-01 ENCOUNTER — OFFICE VISIT (OUTPATIENT)
Dept: NEUROLOGY | Facility: CLINIC | Age: 31
End: 2021-09-01
Attending: NURSE PRACTITIONER
Payer: COMMERCIAL

## 2021-09-01 ENCOUNTER — PRE VISIT (OUTPATIENT)
Dept: NEUROLOGY | Facility: CLINIC | Age: 31
End: 2021-09-01

## 2021-09-01 VITALS
RESPIRATION RATE: 16 BRPM | SYSTOLIC BLOOD PRESSURE: 116 MMHG | BODY MASS INDEX: 24.69 KG/M2 | OXYGEN SATURATION: 100 % | HEART RATE: 60 BPM | DIASTOLIC BLOOD PRESSURE: 77 MMHG | WEIGHT: 130.6 LBS

## 2021-09-01 DIAGNOSIS — R25.2 JERKING MOVEMENTS OF EXTREMITIES: Primary | ICD-10-CM

## 2021-09-01 PROCEDURE — 99204 OFFICE O/P NEW MOD 45 MIN: CPT | Performed by: PSYCHIATRY & NEUROLOGY

## 2021-09-01 ASSESSMENT — PAIN SCALES - GENERAL: PAINLEVEL: MILD PAIN (3)

## 2021-09-01 NOTE — PATIENT INSTRUCTIONS
I suspect these movements are stress induced myoclonic movements.  I would not do more testing at this time given your normal exam.  Further stress therapies is the next best steps in management.    If symptoms emerge: double vision, swallowing difficulties, weakness, spastic tightness of the arms or leg or chest then consider a follow up with me.  If these movements continue past feeling like your emotional triggers and depression are controlled, then consider a follow up with me to discuss issues of hyperkinetic movement disorders.

## 2021-09-01 NOTE — LETTER
9/1/2021       RE: Corrina Beasley  [address]  [facility] [facility] [address]     Dear Colleague,    Thank you for referring your patient, Corrina Beasley, to the [facility] [place] [facility]. Please see a copy of my visit note below.    [facility] Neurology Consultation    Corrina Beasley MRN# [id number]   Age: 31 year old YOB: 1990     Requesting physician: Sonya Mckenzie  [facility] [place]     Reason for Consultation: atypical movements      History of Presenting Symptoms:   Corrina Beasley is a 31 year old female who presents today for evaluation of atypical movements.  The patient reported intermittent twitching in her pelvis to her feet for one week which was worsened with stress and emotions to her PCP on 6/10/2021.  Last year, the patient did have depression symptoms while working with a large degree of [employer] patient, many of whom passed away.  This has led to the need for Zoloft (9/2020) and varying counseling therapies with EMDR.    Today, the patient confirms that three months ago she started developing twitching in her legs/jerking in her legs. She describes that her initial event occurred after an intense therapy session (EMDR), and occurred while trying to sleep.  After this initial event, she has noted that she has clusters/bursts of twitching regularly (10-20 bursts, 4-5 times per week).  The bursts and frequency did eventually taper (1 burst a week), but the symptoms can return.    There is no specific weakness, no sensory loss of dysesthesias, and no LOC.  Often a stressful event can trigger the movements, but it doesn't have to happen.  There is no urge/build up in the movement itself, and no sense of relief when done.  The movements haven't occurred in the torso, arms, or face.  There is no dysphagia, diplopia, pain with eye-movement, headaches, nausea, or vomiting.  The patient does take a multi-vitamin, and  has about one cup of coffee per day.   She sleeps 8 hours a night, sometimes 7.  She sleeps between 9-11 pm until around 630.  She has no abnormal movements of sleep, and during the day she can still feel tired but doesn't need naps.    In the last two to three days, the patient is having some dizziness.  This has coincided with trying to taper off of zoloft.        Past Medical History:   Anxiety  Hypothyroidism     Social History:   Infrequent alcohol use. No smoking history. No recreational drug use.     Medications:     Current Outpatient Medications   Medication Sig     CALCIUM-VITAMIN D PO      levothyroxine (SYNTHROID/LEVOTHROID) 88 MCG tablet Take 1 tablet (88 mcg) by mouth daily     Multiple Vitamin (MULTIVITAMIN PO)      sertraline (ZOLOFT) 50 MG tablet Take 1 tablet (50 mg) by mouth daily      Physical Exam:   Vitals: /77   Pulse 60   Resp 16   Wt 59.2 kg (130 lb 9.6 oz)   SpO2 100%   BMI 24.69 kg/m     General: Seated comfortably in no acute distress.  HEENT: Neck supple with normal range of motion. No paracervical muscle tenderness or tightness.  Optic discs sharp and vasculature normal on funduscopic exam.   Skin: No rashes  Neurologic:     Mental Status: Fully alert, attentive and oriented. Speech clear and fluent, no paraphasic errors.     Cranial Nerves: Visual fields intact. PERRL. EOMI with normal smooth pursuit. Facial sensation intact/symmetric. Facial movements symmetric. Hearing not formally tested but intact to conversation. Palate elevation symmetric (no tremor), uvula midline. No dysarthria. Shoulder shrug strong bilaterally. Tongue protrusion midline, not enlarged or reddened, no fasiculations.     Motor: Suggestible jerks noted on exam today, large and small amplitude movements of the hip flexor (Left) as well as left shoulder anterior movement.  She also had some higher amplitude jerk in right dorsiflexor and hip flexor, as well as fingers noted occasionally. No hemifacial  spasm was noted, no eyelid fluttering was noted, no atypical tremor noted, movements are not stimulus induced. Muscle tone normal throughout. No pronator drift. Normal/symmetric rapid finger tapping. Strength 5/5 throughout upper and lower extremities.     Deep Tendon Reflexes: 2+/symmetric throughout upper and lower extremities. No clonus. Toes downgoing bilaterally.     Sensory: Intact/symmetric to light touch, pinprick, vibration  throughout upper and lower extremities. Negative Romberg.      Coordination: Finger-nose-finger and heel-shin intact without dysmetria. Rapid alternating movements intact/symmetric with normal speed and rhythm.     Gait: Normal, steady casual gait. Able to walk on toes, heels and tandem without difficulty.         Assessment and Plan:   Assessment:  Stress induced myoclonic movements, psychogenic jerks    The patient had an acute onset of myoclonic like jerks following an emotional trauma that now persist (wax/wane, cluster) and are worsened with anxiety and stress.  There is no urge to suggest tic, or compulsion.  The symptoms wax/wane and haven't necessarily led to acute onset with slow progression and worsening, which would be indicative for a paraneoplastic disorder. There is no atypical findings on exam to indicate a spinal injury (no weakness, no spasticity, no UMN signs on exam, laying down doesn't worsen the frequency/onset of the movements). The patient has exaggerated startle, which could be indicative for an underlying hyperkinetic movement disorder, but given her ongoing psychiatric issues and coming off of zoloft, I suspect this would only need further w/up once her psychiatric condition is felt to be stable (with therapist, and adequate treatment).    We did discuss today that should the patient develop any focal weakness, sensory loss/changes, diplopia, optic neuritis, vision changes, shooting spinal pain, or imbalance that she should follow up with me for further  evaluation and possibly imaging.       Plan:  No recommendations at this point    Would consider serum studies, brain and spinal imaging should new symptoms emerge or if the movements lead to functional decline.    Follow up in Neurology clinic in as needed, or should new concerns arise.    ARBEN Cadena D.O.   of Neurology    Total time  today (49 min) in this patient encounter was spent on pre-charting, counseling and/or coordination of care. We reviewed diagnostic results, impressions, and discussed other possible tests if symptoms do not improve. We discussed the implications of the diagnosis, as well as risks and benefits of management options. We reviewed treatment instructions and our scheduled follow-up as specified in the discharge plan. We also discussed the importance of compliance with the chosen course of treatment. The patient is in agreement with this plan and has no further questions.        Again, thank you for allowing me to participate in the care of your patient.      Sincerely,    Evens Cadena, DO

## 2021-09-01 NOTE — PROGRESS NOTES
[facility] Neurology Consultation    Corrina Beasley MRN# [id number]   Age: 31 year old YOB: 1990     Requesting physician: Sonya Mckenzie  [facility] [place]     Reason for Consultation: atypical movements      History of Presenting Symptoms:   Corrina Beasley is a 31 year old female who presents today for evaluation of atypical movements.  The patient reported intermittent twitching in her pelvis to her feet for one week which was worsened with stress and emotions to her PCP on 6/10/2021.  Last year, the patient did have depression symptoms while working with a large degree of [employer] patient, many of whom passed away.  This has led to the need for Zoloft (9/2020) and varying counseling therapies with EMDR.    Today, the patient confirms that three months ago she started developing twitching in her legs/jerking in her legs. She describes that her initial event occurred after an intense therapy session (EMDR), and occurred while trying to sleep.  After this initial event, she has noted that she has clusters/bursts of twitching regularly (10-20 bursts, 4-5 times per week).  The bursts and frequency did eventually taper (1 burst a week), but the symptoms can return.    There is no specific weakness, no sensory loss of dysesthesias, and no LOC.  Often a stressful event can trigger the movements, but it doesn't have to happen.  There is no urge/build up in the movement itself, and no sense of relief when done.  The movements haven't occurred in the torso, arms, or face.  There is no dysphagia, diplopia, pain with eye-movement, headaches, nausea, or vomiting.  The patient does take a multi-vitamin, and has about one cup of coffee per day.   She sleeps 8 hours a night, sometimes 7.  She sleeps between 9-11 pm until around 630.  She has no abnormal movements of sleep, and during the day she can still feel tired but doesn't need naps.    In the last two to three days, the patient is having some dizziness.  This  has coincided with trying to taper off of zoloft.        Past Medical History:   Anxiety  Hypothyroidism     Social History:   Infrequent alcohol use. No smoking history. No recreational drug use.     Medications:     Current Outpatient Medications   Medication Sig     CALCIUM-VITAMIN D PO      levothyroxine (SYNTHROID/LEVOTHROID) 88 MCG tablet Take 1 tablet (88 mcg) by mouth daily     Multiple Vitamin (MULTIVITAMIN PO)      sertraline (ZOLOFT) 50 MG tablet Take 1 tablet (50 mg) by mouth daily      Physical Exam:   Vitals: /77   Pulse 60   Resp 16   Wt 59.2 kg (130 lb 9.6 oz)   SpO2 100%   BMI 24.69 kg/m     General: Seated comfortably in no acute distress.  HEENT: Neck supple with normal range of motion. No paracervical muscle tenderness or tightness.  Optic discs sharp and vasculature normal on funduscopic exam.   Skin: No rashes  Neurologic:     Mental Status: Fully alert, attentive and oriented. Speech clear and fluent, no paraphasic errors.     Cranial Nerves: Visual fields intact. PERRL. EOMI with normal smooth pursuit. Facial sensation intact/symmetric. Facial movements symmetric. Hearing not formally tested but intact to conversation. Palate elevation symmetric (no tremor), uvula midline. No dysarthria. Shoulder shrug strong bilaterally. Tongue protrusion midline, not enlarged or reddened, no fasiculations.     Motor: Suggestible jerks noted on exam today, large and small amplitude movements of the hip flexor (Left) as well as left shoulder anterior movement.  She also had some higher amplitude jerk in right dorsiflexor and hip flexor, as well as fingers noted occasionally. No hemifacial spasm was noted, no eyelid fluttering was noted, no atypical tremor noted, movements are not stimulus induced. Muscle tone normal throughout. No pronator drift. Normal/symmetric rapid finger tapping. Strength 5/5 throughout upper and lower extremities.     Deep Tendon Reflexes: 2+/symmetric throughout upper and  lower extremities. No clonus. Toes downgoing bilaterally.     Sensory: Intact/symmetric to light touch, pinprick, vibration  throughout upper and lower extremities. Negative Romberg.      Coordination: Finger-nose-finger and heel-shin intact without dysmetria. Rapid alternating movements intact/symmetric with normal speed and rhythm.     Gait: Normal, steady casual gait. Able to walk on toes, heels and tandem without difficulty.         Assessment and Plan:   Assessment:  Stress induced myoclonic movements, psychogenic jerks    The patient had an acute onset of myoclonic like jerks following an emotional trauma that now persist (wax/wane, cluster) and are worsened with anxiety and stress.  There is no urge to suggest tic, or compulsion.  The symptoms wax/wane and haven't necessarily led to acute onset with slow progression and worsening, which would be indicative for a paraneoplastic disorder. There is no atypical findings on exam to indicate a spinal injury (no weakness, no spasticity, no UMN signs on exam, laying down doesn't worsen the frequency/onset of the movements). The patient has exaggerated startle, which could be indicative for an underlying hyperkinetic movement disorder, but given her ongoing psychiatric issues and coming off of zoloft, I suspect this would only need further w/up once her psychiatric condition is felt to be stable (with therapist, and adequate treatment).    We did discuss today that should the patient develop any focal weakness, sensory loss/changes, diplopia, optic neuritis, vision changes, shooting spinal pain, or imbalance that she should follow up with me for further evaluation and possibly imaging.       Plan:  No recommendations at this point    Would consider serum studies, brain and spinal imaging should new symptoms emerge or if the movements lead to functional decline.    Follow up in Neurology clinic in as needed, or should new concerns arise.    ARBEN Cadena,  HONG   of Neurology    Total time  today (49 min) in this patient encounter was spent on pre-charting, counseling and/or coordination of care. We reviewed diagnostic results, impressions, and discussed other possible tests if symptoms do not improve. We discussed the implications of the diagnosis, as well as risks and benefits of management options. We reviewed treatment instructions and our scheduled follow-up as specified in the discharge plan. We also discussed the importance of compliance with the chosen course of treatment. The patient is in agreement with this plan and has no further questions.

## 2021-09-01 NOTE — NURSING NOTE
Chief Complaint   Patient presents with     Consult     [facility] NEW - myoclonic jerking     Trevin Perez

## 2021-09-04 DIAGNOSIS — E03.9 ACQUIRED HYPOTHYROIDISM: ICD-10-CM

## 2021-09-07 RX ORDER — LEVOTHYROXINE SODIUM 88 UG/1
88 TABLET ORAL DAILY
Qty: 90 TABLET | Refills: 3 | OUTPATIENT
Start: 2021-09-07

## 2021-09-07 NOTE — TELEPHONE ENCOUNTER
"Requested Prescriptions   Pending Prescriptions Disp Refills     levothyroxine (SYNTHROID/LEVOTHROID) 88 MCG tablet 90 tablet 3     Sig: Take 1 tablet (88 mcg) by mouth daily       Thyroid Protocol Failed - 9/4/2021 11:38 AM        Failed - Normal TSH on file in past 12 months     Recent Labs   Lab Test 08/10/21  1439   TSH 2.56              Passed - Patient is 12 years or older        Passed - Recent (12 mo) or future (30 days) visit within the authorizing provider's specialty     Patient has had an office visit with the authorizing provider or a provider within the authorizing providers department within the previous 12 mos or has a future within next 30 days. See \"Patient Info\" tab in inbasket, or \"Choose Columns\" in Meds & Orders section of the refill encounter.              Passed - Medication is active on med list        Passed - No active pregnancy on record     If patient is pregnant or has had a positive pregnancy test, please check TSH.          Passed - No positive pregnancy test in past 12 months     If patient is pregnant or has had a positive pregnancy test, please check TSH.               Shantelle ELAMN, RN    "

## 2021-09-08 NOTE — TELEPHONE ENCOUNTER
Pt called and she stated that she switched pharmacy's. And they requested this. Pt said she is okay on  Medication and she does not need this filled.

## 2021-09-18 ENCOUNTER — HEALTH MAINTENANCE LETTER (OUTPATIENT)
Age: 31
End: 2021-09-18

## 2022-02-15 ENCOUNTER — MYC MEDICAL ADVICE (OUTPATIENT)
Dept: FAMILY MEDICINE | Facility: CLINIC | Age: 32
End: 2022-02-15
Payer: COMMERCIAL

## 2022-04-07 NOTE — PROGRESS NOTES
Nurse Note      [facility]      Departure Date: 5/25/2022      Return Date: 7/27/22       Length of Trip 2 months      Reason for Travel: Tourism    Visiting friends and relatives           Urban or rural: both      Accommodations: Hotel    Hostel        IMMUNIZATION HISTORY  Have you received any immunizations within the past 4 weeks?  No  Have you ever fainted from having your blood drawn or from an injection?  No  Have you ever had a fever reaction to vaccination?  Yes  Have you ever had any bad reaction or side effect from any vaccination?  No  Have you ever had hepatitis A or B vaccine?  Yes  Do you live (or work closely) with anyone who has AIDS, an AIDS-like condition, any other immune disorder or who is on chemotherapy for cancer?  No  Do you have a family history of immunodeficiency?  No  Have you received any injection of immune globulin or any blood products during the past 12 months?  No    Patient roomed by ESSENCE Jimenez CNP on 4/14/2022 at 4:26 PM      Subjective  Corrina Beasley is a 31 year old female seen today alone for counsultation for international travel.   Patient will be departing in  6 week(s) and  traveling with a friend .      Patient itinerary :  will be on the [facility]  and the urban region of [place] [place]  which risk for Dengue Fever, food borne illnesses, motor vehicle accidents and Covid. exposure.      Patient's activities will include hiking and attending a wedding.    Patient's country of birth is [place]    Special medical concerns: currently has ankle pain ( concerns about hike ) . Is starting PT  Pre-travel questionnaire was completed by patient and reviewed by provider.     Vitals: /69   Wt 60.7 kg (133 lb 14.4 oz)   LMP 03/19/2022   BMI 25.32 kg/m    BMI= Body mass index is 25.32 kg/m .    EXAM:  General:  Well-nourished, well-developed in no acute distress.  Appears to be stated age, interacts appropriately and expresses  understanding of information given to patient.    Current Outpatient Medications   Medication Sig Dispense Refill     CALCIUM-VITAMIN D PO Take by mouth daily        diclofenac (VOLTAREN) 50 MG EC tablet Take 1 tablet (50 mg) by mouth 2 times daily for 7 days 28 tablet 1     levothyroxine (SYNTHROID/LEVOTHROID) 88 MCG tablet Take 1 tablet (88 mcg) by mouth daily 90 tablet 3     Multiple Vitamin (MULTIVITAMIN PO) Take by mouth daily        sertraline (ZOLOFT) 50 MG tablet Take 1 tablet (50 mg) by mouth daily 90 tablet 1     Patient Active Problem List   Diagnosis     Acquired hypothyroidism     CARDIOVASCULAR SCREENING; LDL GOAL LESS THAN 160     Allergies   Allergen Reactions     Seasonal Allergies          Immunizations discussed include:   Covid 19: Up to date  Hepatitis A:  Up to date  Hepatitis B: Up to date  Influenza: Up to date  Typhoid: Ordered/given today, risks, benefits and side effects reviewed  Rabies: Declined  reviewed managment of a animal bite or scratch (washing wound, seek medical care within 24 hours for post exposure prophylaxis )  Yellow Fever: Not indicated  Japanese Encephalitis: Not indicated  Meningococcus: Not indicated  Tetanus/Diphtheria: Is due will get this at annual visit   Measles/Mumps/Rubella: Up to date  Cholera: Not needed  Polio: Not indicated  Pneumococcal: Under age of 65  Varicella: Immune by disease history per patient report  Shingrix: Not indicated  HPV:  Not indicated     TB: low risk     Altitude Exposure on this trip: no  Past tolerance to Altitude: na    ASSESSMENT/PLAN:  There are no diagnoses linked to this encounter.  I have reviewed general recommendations for safe travel   including: food/water precautions, insect precautions, safer sex   practices given high prevalence of Zika, HIV and other STDs,   roadway safety. Educational materials and [employer] report provided.    Malaraia prophylaxis recommended: none  Symptomatic treatment for traveler's diarrhea:  azithromycin  Altitude illness prevention and treatment: no    Personal protective measures reviewed including hand sanitizing and contact precautions for the prevention of viral illnesses. Cover coughs and masking  during travel and upon return.  Current COVID 19 pandemic.   Monitor / follow current CDC guidelines.    Country specific and CDC Covid 19  testing requirements and resources given to patient.      Evacuation insurance advised and resources were provided to patient.    Total visit time 30 minutes  with over 50% of time spent counseling patient as detailed above.    Enedina Ruff CNP  Certificate in Travel Health

## 2022-04-08 ENCOUNTER — ANCILLARY PROCEDURE (OUTPATIENT)
Dept: GENERAL RADIOLOGY | Facility: CLINIC | Age: 32
End: 2022-04-08
Attending: PODIATRIST
Payer: COMMERCIAL

## 2022-04-08 ENCOUNTER — OFFICE VISIT (OUTPATIENT)
Dept: PODIATRY | Facility: CLINIC | Age: 32
End: 2022-04-08
Payer: COMMERCIAL

## 2022-04-08 VITALS
BODY MASS INDEX: 25.11 KG/M2 | SYSTOLIC BLOOD PRESSURE: 110 MMHG | HEIGHT: 61 IN | WEIGHT: 133 LBS | DIASTOLIC BLOOD PRESSURE: 62 MMHG

## 2022-04-08 DIAGNOSIS — M79.672 LEFT FOOT PAIN: ICD-10-CM

## 2022-04-08 DIAGNOSIS — M25.372 ANKLE INSTABILITY, LEFT: Primary | ICD-10-CM

## 2022-04-08 DIAGNOSIS — M76.72 PERONEAL TENDONITIS, LEFT: ICD-10-CM

## 2022-04-08 DIAGNOSIS — S93.332A SUBLUXATION OF PERONEAL TENDON OF LEFT FOOT: ICD-10-CM

## 2022-04-08 DIAGNOSIS — M25.372 ANKLE INSTABILITY, LEFT: ICD-10-CM

## 2022-04-08 PROCEDURE — 73630 X-RAY EXAM OF FOOT: CPT | Mod: LT | Performed by: RADIOLOGY

## 2022-04-08 PROCEDURE — 73600 X-RAY EXAM OF ANKLE: CPT | Mod: LT | Performed by: RADIOLOGY

## 2022-04-08 PROCEDURE — 99204 OFFICE O/P NEW MOD 45 MIN: CPT | Performed by: PODIATRIST

## 2022-04-08 NOTE — LETTER
4/8/2022         RE: Corrina Beasley  [address] [place] [facility] [address]        Dear Colleague,    Thank you for referring your patient, Corrina Beasley, to the [facility]. Please see a copy of my visit note below.    Assessment:      ICD-10-CM    1. Ankle instability, left  M25.372 XR Ankle Left 2 Views     diclofenac (VOLTAREN) 50 MG EC tablet     Physical Therapy Referral     Ankle/Foot Bracing Supplies Order   2. Peroneal tendonitis, left  M76.72 XR Ankle Left 2 Views     diclofenac (VOLTAREN) 50 MG EC tablet     Physical Therapy Referral     Ankle/Foot Bracing Supplies Order   3. Subluxation of peroneal tendon of left foot  S93.332A         Plan:  Orders Placed This Encounter   Procedures     XR Foot Left G/E 3 Views     XR Ankle Left 2 Views     Physical Therapy Referral     Ankle/Foot Bracing Supplies Order       Discussed the etiology and treatment of the condition with the patient.  Imaging studies reviewed and discussed with the patient.  Discussed surgical and conservative options.  Chronic lateral ankle instability with peroneal tendonitis & subluxation.    -ASO ankle brace, cinch to eversion  -PO NSAID  -PT referral    Brief discussion on lateral ligament, peroneal tendon repair as definitive    -Injection- discussed      Return:  Return in about 6 weeks (around 5/20/2022), or if symptoms worsen or fail to improve.    Samreen Harrington DPM                Chief Complaint:     No chief complaint on file.     left foot/ankle pain    HPI:  Corrina Beasley is a 31 year old year old female who presents for evaluation of foot pain.    Pain location- lateral ankle / rearfoot.    Swelling, pain    Likes to hike, walk.  Has Altras today.    Past Medical & Surgical History:  No past medical history on file.   Past Surgical History:   Procedure Laterality Date     AS MYOMECTOMY 5/>,TOT>250 GMS,ABD APPRCH  02/11/2016     HC TOOTH EXTRACTION W/FORCEP  05/2009      Family History   Problem  "Relation Age of Onset     Hypertension Father      Alcoholism Father      Hepatitis Father         C     Cancer Maternal Grandfather      Hypothyroidism Paternal Grandmother      Cancer Paternal Grandfather      Hypothyroidism Paternal Aunt         Social History:  ?  History   Smoking Status     Never Smoker   Smokeless Tobacco     Never Used     History   Drug Use Unknown     Social History    Substance and Sexual Activity      Alcohol use: Yes        Comment: 2 drinks or less per week      Allergies:  ?   Allergies   Allergen Reactions     Seasonal Allergies         Medications:    Current Outpatient Medications   Medication     CALCIUM-VITAMIN D PO     diclofenac (VOLTAREN) 50 MG EC tablet     levothyroxine (SYNTHROID/LEVOTHROID) 88 MCG tablet     Multiple Vitamin (MULTIVITAMIN PO)     sertraline (ZOLOFT) 50 MG tablet     No current facility-administered medications for this visit.       Physical Exam:  ?  Vitals:  /62   Ht 1.549 m (5' 0.98\")   Wt 60.3 kg (133 lb)   BMI 25.15 kg/m     General:  WD/WN, in NAD.  A&O x3.  Dermatologic:    Skin is intact, open lesions absent.   Skin texture, turgor is normal.  Vascular:  Pulses palpable bilateral.  Digital capillary refill time normal bilateral.  Skin temperature is normal bilateral.  Generalized edema- none bilateral.  Focal edema- mild peroneals left.  Neurologic:    Gross sensation normal.  Gait and balance normal.  Musculoskeletal:  Maximal pain to palpation of peroneals distal to fibula along lateral RF, left.  Ankle ROM smooth, nonpainful left.  Ankle anterior drawer, talar tilt increased Left vs R  Ankle circumduction produces mild peroneal tendon subluxation, left.  Manual Muscle Testing of PB  painful  4/5  left.    Stance:  RCSP Valgus bilateral.    Imaging:   x-ray independently reviewed and interpreted by myself today.  Weight-bearing views left foot and ankle dated 04/08/22, reveal flexible flatfoot w/o degeneration, possible widened ankle " mortise but cannot determine due to malrotation of ankle films.    No ankle degeneration, but possible lateral talar dome cystic change.                    Again, thank you for allowing me to participate in the care of your patient.        Sincerely,        Samreen Harrington DPM

## 2022-04-08 NOTE — PATIENT INSTRUCTIONS
PATIENT INSTRUCTIONS - Podiatry / Foot & Ankle Surgery      Diclofenac- 1 pill twice daily x1 week.  Then take a 1 week break.  Repeat as needed.  Take with food & an acid blocker if stomach upset occurs.  Stop all other NSAIDs (aspirin, ibuprofen/Motrin, naproxen/ Aleve).      Physical Therapy  You have been referred to [facility].  Locations can be found online.  Please call to schedule an appointment:  [phone]      Wear ankle brace when active - over compression sleeve  Cinch the lateral figure of 8 strap into eversion

## 2022-04-08 NOTE — PROGRESS NOTES
Assessment:      ICD-10-CM    1. Ankle instability, left  M25.372 XR Ankle Left 2 Views     diclofenac (VOLTAREN) 50 MG EC tablet     Physical Therapy Referral     Ankle/Foot Bracing Supplies Order   2. Peroneal tendonitis, left  M76.72 XR Ankle Left 2 Views     diclofenac (VOLTAREN) 50 MG EC tablet     Physical Therapy Referral     Ankle/Foot Bracing Supplies Order   3. Subluxation of peroneal tendon of left foot  S93.332A         Plan:  Orders Placed This Encounter   Procedures     XR Foot Left G/E 3 Views     XR Ankle Left 2 Views     Physical Therapy Referral     Ankle/Foot Bracing Supplies Order       Discussed the etiology and treatment of the condition with the patient.  Imaging studies reviewed and discussed with the patient.  Discussed surgical and conservative options.  Chronic lateral ankle instability with peroneal tendonitis & subluxation.    -ASO ankle brace, cinch to eversion  -PO NSAID  -PT referral    Brief discussion on lateral ligament, peroneal tendon repair as definitive    -Injection- discussed      Return:  Return in about 6 weeks (around 5/20/2022), or if symptoms worsen or fail to improve.    Samreen Harrington DPM                Chief Complaint:     No chief complaint on file.     left foot/ankle pain    HPI:  Corrina Beasley is a 31 year old year old female who presents for evaluation of foot pain.    Pain location- lateral ankle / rearfoot.    Swelling, pain    Likes to hike, walk.  Has Altras today.    Past Medical & Surgical History:  No past medical history on file.   Past Surgical History:   Procedure Laterality Date     AS MYOMECTOMY 5/>,TOT>250 GMS,ABD APPRCH  02/11/2016     HC TOOTH EXTRACTION W/FORCEP  05/2009      Family History   Problem Relation Age of Onset     Hypertension Father      Alcoholism Father      Hepatitis Father         C     Cancer Maternal Grandfather      Hypothyroidism Paternal Grandmother      Cancer Paternal Grandfather      Hypothyroidism Paternal Aunt      "    Social History:  ?  History   Smoking Status     Never Smoker   Smokeless Tobacco     Never Used     History   Drug Use Unknown     Social History    Substance and Sexual Activity      Alcohol use: Yes        Comment: 2 drinks or less per week      Allergies:  ?   Allergies   Allergen Reactions     Seasonal Allergies         Medications:    Current Outpatient Medications   Medication     CALCIUM-VITAMIN D PO     diclofenac (VOLTAREN) 50 MG EC tablet     levothyroxine (SYNTHROID/LEVOTHROID) 88 MCG tablet     Multiple Vitamin (MULTIVITAMIN PO)     sertraline (ZOLOFT) 50 MG tablet     No current facility-administered medications for this visit.       Physical Exam:  ?  Vitals:  /62   Ht 1.549 m (5' 0.98\")   Wt 60.3 kg (133 lb)   BMI 25.15 kg/m     General:  WD/WN, in NAD.  A&O x3.  Dermatologic:    Skin is intact, open lesions absent.   Skin texture, turgor is normal.  Vascular:  Pulses palpable bilateral.  Digital capillary refill time normal bilateral.  Skin temperature is normal bilateral.  Generalized edema- none bilateral.  Focal edema- mild peroneals left.  Neurologic:    Gross sensation normal.  Gait and balance normal.  Musculoskeletal:  Maximal pain to palpation of peroneals distal to fibula along lateral RF, left.  Ankle ROM smooth, nonpainful left.  Ankle anterior drawer, talar tilt increased Left vs R  Ankle circumduction produces mild peroneal tendon subluxation, left.  Manual Muscle Testing of PB  painful  4/5  left.    Stance:  RCSP Valgus bilateral.    Imaging:   x-ray independently reviewed and interpreted by myself today.  Weight-bearing views left foot and ankle dated 04/08/22, reveal flexible flatfoot w/o degeneration, possible widened ankle mortise but cannot determine due to malrotation of ankle films.    No ankle degeneration, but possible lateral talar dome cystic change.                "

## 2022-04-14 ENCOUNTER — OFFICE VISIT (OUTPATIENT)
Dept: FAMILY MEDICINE | Facility: CLINIC | Age: 32
End: 2022-04-14
Payer: COMMERCIAL

## 2022-04-14 VITALS — DIASTOLIC BLOOD PRESSURE: 69 MMHG | WEIGHT: 133.9 LBS | SYSTOLIC BLOOD PRESSURE: 106 MMHG | BODY MASS INDEX: 25.32 KG/M2

## 2022-04-14 DIAGNOSIS — Z71.84 TRAVEL ADVICE ENCOUNTER: Primary | ICD-10-CM

## 2022-04-14 PROCEDURE — 99402 PREV MED CNSL INDIV APPRX 30: CPT | Mod: 25 | Performed by: NURSE PRACTITIONER

## 2022-04-14 PROCEDURE — 90471 IMMUNIZATION ADMIN: CPT | Performed by: NURSE PRACTITIONER

## 2022-04-14 PROCEDURE — 90691 TYPHOID VACCINE IM: CPT | Performed by: NURSE PRACTITIONER

## 2022-04-14 RX ORDER — AZITHROMYCIN 500 MG/1
500 TABLET, FILM COATED ORAL DAILY
Qty: 3 TABLET | Refills: 0 | Status: SHIPPED | OUTPATIENT
Start: 2022-04-14 | End: 2022-04-17

## 2022-04-14 NOTE — PATIENT INSTRUCTIONS
Thank you for visiting the [facility] : [phone]  Today April 14, 2022 you received the    Typhoid - injectable. This vaccine is valid for two years.     Follow up vaccine appointments can be made as a NURSE ONLY visit at the Travel Clinic, (BE PREPARED TO WAIT, ) or at designated [facility].    If you are receiving the Rabies vaccines series, it is important that you follow the exact schedule ordered.     Pre-travel     We recommend that you purchase Medical Evacuation Insurance prior to your departure.  Https://wwwnc.cdc.gov/travel/page/insurance    [place] your travel plans with the  Department of State through STEP ( Smart Traveler Enrollment Program ) https://step.state.gov.  STEP is a free service to allow [place] citizens and nationals traveling and living abroad to enroll their trip with the nearest [place] Embassy or Consulate.    Animal Exposure: Avoid all mammals even if they look healthy.  If there is a bite, scratch or even a lick, wash area immediately with soap and water for 15 minutes and seek medical care within 24 hours for evaluation of Rabies post exposure treatment.  Contact your Medical Evacuation Insurance.    COVID 19 (Sars Cov2) prevention strategies  Physical distancing: Maintain 6 foot (2m) from others.              Avoid large gatherings and public transportation.   Avoid indoor shopping malls, theaters and restaurants   Practice consistent mask wearing covering the nose, mouth and underneath the chin when unable to maintain 6 foot distance from others.  Hand washing: frequent, thorough handwashing with soap and water for 20 seconds (or using a hand  containing 60% alcohol)   Avoid touching face, nose, eyes, mouth unless you have done appropriate hand washing as above.   Clean high touch surfaces with approved disinfectant against Covid 19  (70% Ethanol ) or a bleach solution (add 20 mL (4 teaspoons) of bleach to 1 L (1 quart) of  water;)  Be careful not to breath or touch bleach.      Travel Covid 19 Testing:  updated 12/06/2021  International travelers: Pre-travel: diagnostic testing (antigen or PCR) may be required for entry:  See country specific Embassy websites or airline websites.    [place] ENTRY Requirements: Effective December 6, 2021, all international arrivals to the [place] (regardless of vaccination status or citizenship status) by air are required to have a negative predeparture COVID-19 result from a test taken no more than 1 calendar day prior to departure of the flight to the [place]. Many complex scenarios may result from the 1-day rule. For example, for a flight that arrives in the [place] on a Monday, the test must have been taken no earlier than Sunday local time in the departure city. If the itinerary contains multiple flights, the test can be taken 1 day prior to departure of the first flight or can be taken en route, as long as the connecting airport is not in the [place]. If the test is unable to be taken en route, the traveler will not be able to enter the [place], or if the test is taken en route and is positive, the traveler will have to isolate in that location. If the itinerary contains 1 or more overnight stays en route to the [place], the test must have been taken 1 calendar day before the flight that will enter the [place]; however, if the itinerary has an overnight connection due to limitations in flight availability, retesting will not be required. If the first flight within an itinerary is delayed due to severe weather, aircraft mechanical issues, or other issues outside of the traveler's control, the traveler will only need to be retested if the delay causes the original test to be 24 hours or more past the 1-day window. If a connecting flight is delayed due to any of the above issues, the traveler will only need to be retested if the delay causes the original test to be 48 hours or more past the 1-day window. If a trip of less than 1 day is  made out the [place], a viral test taken in the [place] may be used as a predeparture test as long as the test was taken no more than 1 day prior to rearrival in the [place]; however, if a delay occurs on the return trip and the predeparture test is out of the 1-day window, the traveler will need to be retested before returning to the [place]. Noncitizen nonimmigrants who are unvaccinated remain banned from entry    Post travel: CDC recommends getting tested 3-5 days after your trip AND stay home and self-quarantine for 7 days.      COVID-19 testing scheduling number for pre-travel through [facility]  [phone] (Must have an order). Available 24 hours a day.  You can also schedule through My Chart.     Post-travel illness:  Contact your provider or [facility] if you develop a fever, rash, cough, diarrhea or other symptoms for up to 1 year after travel.  Inform your healthcare provider when and where you traveled to.    Please call the [facility] with any questions [phone]  Or send your provider a 'My Chart' note.

## 2022-04-20 ENCOUNTER — THERAPY VISIT (OUTPATIENT)
Dept: PHYSICAL THERAPY | Facility: CLINIC | Age: 32
End: 2022-04-20
Attending: PODIATRIST
Payer: COMMERCIAL

## 2022-04-20 DIAGNOSIS — M25.372 ANKLE INSTABILITY, LEFT: ICD-10-CM

## 2022-04-20 DIAGNOSIS — M76.72 PERONEAL TENDONITIS, LEFT: ICD-10-CM

## 2022-04-20 DIAGNOSIS — M25.572 ANKLE PAIN, LEFT: ICD-10-CM

## 2022-04-20 PROCEDURE — 97161 PT EVAL LOW COMPLEX 20 MIN: CPT | Mod: GP | Performed by: PHYSICAL THERAPIST

## 2022-04-20 PROCEDURE — 97110 THERAPEUTIC EXERCISES: CPT | Mod: GP | Performed by: PHYSICAL THERAPIST

## 2022-04-20 NOTE — PROGRESS NOTES
"Physical Therapy Initial Evaluation  Subjective:  The history is provided by the patient. No  was used.   Patient Health History           General health as reported by patient is good.  Pertinent medical history includes: thyroid problems.   Red flags:  None as reported by patient.  Medical allergies: none.   Surgeries include:  Other. Other surgery history details: Abdominal myomectomy.    Current medications:  Anti-depressants and thyroid medication.    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   Primary job tasks include:  Computer work and prolonged sitting.                  Therapist Generated HPI Evaluation  Problem details: Patient referred for left ankle pain.  She reports starting a walking program 1-3 miles January 2022, and in the last 2 months has had onset of \"new pains\" in addition to some of her more chronic pains listed below.  New pain is lateral left ankle/foot, lateral lower leg, plantar aspect of heel up to achilles tendon area.  Pain ranges 0-9/10, describes as intermittently \"sharp\" and \"achy\".  Pain has been worse since wearing a lace-up ankle brace issued by the podiatrist about 2 weeks ago. She has history of chronic intermittent bilateral arch pain, possibly for up to 1 year, which has continued, and also has intermittent chronic history of left>right anterior hip pain/stiffness as well as history of right buttock and posterior thigh pain a few years ago.  Current foot/ankle symptoms increase with first steps after sitting (not as bad first thing in a.m.), walking >20', going down stairs, immediately with standing (heel).  Symptoms decrease with icing, rest.  She has not done any recent stretching. .                     Pain is worse in the P.M..  Since onset symptoms are gradually worsening.     Special tests included:  X-ray (left foot/ankle).    Restrictions due to condition include:  Working in " "normal job without restrictions.  Barriers include:  None as reported by patient.                        Objective:  Standing Alignment:        Lumbar:  Lateral shift L        Ankle/Foot:  Pes planus L and pes planus R    Gait:  Shortened right step length, minimal trunk rotation/arm swing, does not fully resupinate (either side) prior to heel strike   Assistive Devices:  None            Ankle/Foot Evaluation  ROM:  AROM is normal.      Strength is normal (PF testing on left feels \"stiff\" during, no weakness or pain noted).  LIGAMENT TESTING: not assessed                PALPATION: normal      Right ankle tenderness not present at:  gastroc/soleus; achilles tendon; medial calcaneal or anterior talofibular ligament  EDEMA: normal                 Lumbar/SI Evaluation  ROM:    AROM Lumbar:   Flexion:          Fingertips mid shin  Ext:                    50%-pain LB   Side Bend:        Left:     Right:   Rotation:           Left:     Right:   Side Glide:        Left:  Mod restricted - pain opposite LB    Right:  Mod restricted - pain opp LB          Lumbar Myotomes:  normal                Lumbar Dermtomes:  Lumbar dermatomes: hypersensitivity left S1 dermatome on the left, rest lumbar dermatomes symetrical.                Neural Tension/Mobility:    Left side:  Slump positive.   Right side:   Slump positive.        Spinal Segmental Conclusions: Localized pain with spring testing L3-L5, hypomobility to same                                                   Symptoms prior to test movements:  Left heel/lateral ankle pain  Correction of sitting posture with lumbar roll:  No effect  Prone:  No discomfort noted ankle/heel  REIL:  Produce central LBP, no worse, ? Increase pain ankle with walking after       Assessment/Plan:    Patient is a 31 year old female with left heel and ankle complaints with bilateral pes planus, however otherwise unremarkable ankle exam.  She has multiple areas of symptoms over the last 1+ years, no " specific ankle injury.  Patient does have positive findings with the lumbar spine, suspect possible referred symptoms from lumbar vs specific ankle issue.  Patient will do repeated lumbar extension and work on sitting posture, including putting her work computer on a rolling cart vs her lap and will reassess in 1 week.   Patient has the following significant findings with corresponding treatment plan.                Diagnosis 1:  Left ankle/foot pain    Pain -  self management, education and home program  Decreased ROM/flexibility - therapeutic exercise and home program  Impaired gait - home program  Decreased function - therapeutic activities and home program  Impaired posture - neuro re-education and home program    Cumulative Therapy Evaluation is: Low complexity.    Previous and current functional limitations:  (See Goal Flow Sheet for this information)    Short term and Long term goals: (See Goal Flow Sheet for this information)     Communication ability:  Patient appears to be able to clearly communicate and understand verbal and written communication and follow directions correctly.  Treatment Explanation - The following has been discussed with the patient:   RX ordered/plan of care  Anticipated outcomes  Possible risks and side effects  This patient would benefit from PT intervention to resume normal activities.   Rehab potential is good.    Frequency:  1 X week, once daily  Duration:  for 6 weeks  Discharge Plan:  Achieve all LTG.  Independent in home treatment program.  Reach maximal therapeutic benefit.    Please refer to the daily flowsheet for treatment today, total treatment time and time spent performing 1:1 timed codes.

## 2022-04-27 ENCOUNTER — THERAPY VISIT (OUTPATIENT)
Dept: PHYSICAL THERAPY | Facility: CLINIC | Age: 32
End: 2022-04-27
Payer: COMMERCIAL

## 2022-04-27 DIAGNOSIS — M25.572 ANKLE PAIN, LEFT: Primary | ICD-10-CM

## 2022-04-27 PROCEDURE — 97110 THERAPEUTIC EXERCISES: CPT | Mod: GP | Performed by: PHYSICAL THERAPIST

## 2022-04-27 PROCEDURE — 97140 MANUAL THERAPY 1/> REGIONS: CPT | Mod: GP | Performed by: PHYSICAL THERAPIST

## 2022-05-03 ENCOUNTER — OFFICE VISIT (OUTPATIENT)
Dept: FAMILY MEDICINE | Facility: CLINIC | Age: 32
End: 2022-05-03
Payer: COMMERCIAL

## 2022-05-03 VITALS
WEIGHT: 130.25 LBS | TEMPERATURE: 97 F | RESPIRATION RATE: 16 BRPM | BODY MASS INDEX: 24.59 KG/M2 | HEART RATE: 64 BPM | SYSTOLIC BLOOD PRESSURE: 113 MMHG | HEIGHT: 61 IN | DIASTOLIC BLOOD PRESSURE: 70 MMHG | OXYGEN SATURATION: 100 %

## 2022-05-03 DIAGNOSIS — Z13.6 CARDIOVASCULAR SCREENING; LDL GOAL LESS THAN 160: ICD-10-CM

## 2022-05-03 DIAGNOSIS — E03.9 ACQUIRED HYPOTHYROIDISM: ICD-10-CM

## 2022-05-03 DIAGNOSIS — Z00.00 ROUTINE GENERAL MEDICAL EXAMINATION AT A HEALTH CARE FACILITY: Primary | ICD-10-CM

## 2022-05-03 DIAGNOSIS — Z13.1 SCREENING FOR DIABETES MELLITUS: ICD-10-CM

## 2022-05-03 DIAGNOSIS — F41.9 ANXIETY: ICD-10-CM

## 2022-05-03 LAB — TSH SERPL DL<=0.005 MIU/L-ACNC: 0.96 MU/L (ref 0.4–4)

## 2022-05-03 PROCEDURE — 84443 ASSAY THYROID STIM HORMONE: CPT | Performed by: NURSE PRACTITIONER

## 2022-05-03 PROCEDURE — 99395 PREV VISIT EST AGE 18-39: CPT | Performed by: NURSE PRACTITIONER

## 2022-05-03 PROCEDURE — 36415 COLL VENOUS BLD VENIPUNCTURE: CPT | Performed by: NURSE PRACTITIONER

## 2022-05-03 RX ORDER — LANOLIN ALCOHOL/MO/W.PET/CERES
2 CREAM (GRAM) TOPICAL
COMMUNITY

## 2022-05-03 RX ORDER — LEVOTHYROXINE SODIUM 88 UG/1
88 TABLET ORAL DAILY
Qty: 90 TABLET | Refills: 3 | Status: SHIPPED | OUTPATIENT
Start: 2022-05-03 | End: 2023-06-12

## 2022-05-03 RX ORDER — SERTRALINE HYDROCHLORIDE 25 MG/1
25 TABLET, FILM COATED ORAL DAILY
Qty: 90 TABLET | Refills: 3 | Status: SHIPPED | OUTPATIENT
Start: 2022-05-03 | End: 2023-07-10

## 2022-05-03 RX ORDER — CETIRIZINE HYDROCHLORIDE 10 MG/1
10 TABLET ORAL DAILY
COMMUNITY

## 2022-05-03 ASSESSMENT — ENCOUNTER SYMPTOMS
COUGH: 0
PALPITATIONS: 0
MYALGIAS: 0
DYSURIA: 0
ARTHRALGIAS: 0
HEADACHES: 0
EYE PAIN: 0
CHILLS: 0
DIZZINESS: 0
CONSTIPATION: 0
HEARTBURN: 0
FREQUENCY: 0
PARESTHESIAS: 0
JOINT SWELLING: 0
FEVER: 0
NERVOUS/ANXIOUS: 0
DIARRHEA: 0
SORE THROAT: 0
ABDOMINAL PAIN: 0
WEAKNESS: 0
BREAST MASS: 0
HEMATOCHEZIA: 0
SHORTNESS OF BREATH: 0
HEMATURIA: 0
NAUSEA: 0

## 2022-05-03 ASSESSMENT — PAIN SCALES - GENERAL: PAINLEVEL: MILD PAIN (3)

## 2022-05-03 NOTE — PROGRESS NOTES
SUBJECTIVE:   CC: Corrina Beasley is an 31 year old woman who presents for preventive health visit.       Patient has been advised of split billing requirements and indicates understanding: Yes  Healthy Habits:     Getting at least 3 servings of Calcium per day:  Yes    Bi-annual eye exam:  NO    Dental care twice a year:  Yes    Sleep apnea or symptoms of sleep apnea:  None    Diet:  Regular (no restrictions)    Frequency of exercise:  2-3 days/week    Duration of exercise:  15-30 minutes    Taking medications regularly:  Yes    Medication side effects:  Other    PHQ-2 Total Score: 2    Additional concerns today:  Yes    Stopped zoloft in Aug. Restarted in Dec but only taking 25 mg  Taking for anxiety  No thoughts of harm  Feels safe    Going to [facility] and [facility] for 2 months on 5/25      Hypothyroidism Follow-up      Since last visit, patient describes the following symptoms: Weight stable, no hair loss, no skin changes, no constipation, no loose stools      Today's PHQ-2 Score:   PHQ-2 ( 1999 Pf[employer]) 5/3/2022   Q1: Little interest or pleasure in doing things 1   Q2: Feeling down, depressed or hopeless 1   PHQ-2 Score 2   PHQ-2 Total Score (12-17 Years)- Positive if 3 or more points; Administer PHQ-A if positive -   Q1: Little interest or pleasure in doing things Several days   Q2: Feeling down, depressed or hopeless Several days   PHQ-2 Score 2       Abuse: Current or Past (Physical, Sexual or Emotional) - No  Do you feel safe in your environment? Yes        Social History     Tobacco Use     Smoking status: Never Smoker     Smokeless tobacco: Never Used   Substance Use Topics     Alcohol use: Yes     Comment: 2 drinks or less per week         Alcohol Use 5/3/2022   Prescreen: >3 drinks/day or >7 drinks/week? No   Prescreen: >3 drinks/day or >7 drinks/week? -       Reviewed orders with patient.  Reviewed health maintenance and updated orders accordingly - Yes  Lab work is in process  Labs reviewed in EPIC  BP  Readings from Last 3 Encounters:   05/03/22 113/70   04/14/22 106/69   04/08/22 110/62    Wt Readings from Last 3 Encounters:   05/03/22 59.1 kg (130 lb 4 oz)   04/14/22 60.7 kg (133 lb 14.4 oz)   04/08/22 60.3 kg (133 lb)                  Patient Active Problem List   Diagnosis     Acquired hypothyroidism     CARDIOVASCULAR SCREENING; LDL GOAL LESS THAN 160     Ankle pain, left     Past Surgical History:   Procedure Laterality Date     AS MYOMECTOMY 5/>,TOT>250 GMS,ABD APPRCH  02/11/2016     HC TOOTH EXTRACTION W/FORCEP  05/2009       Social History     Tobacco Use     Smoking status: Never Smoker     Smokeless tobacco: Never Used   Substance Use Topics     Alcohol use: Yes     Comment: 2 drinks or less per week     Family History   Problem Relation Age of Onset     Hypertension Father      Alcoholism Father      Hepatitis Father         C     Cancer Maternal Grandfather      Hypothyroidism Paternal Grandmother      Cancer Paternal Grandfather      Hypothyroidism Paternal Aunt          Current Outpatient Medications   Medication Sig Dispense Refill     CALCIUM-VITAMIN D PO Take by mouth daily        cetirizine (ZYRTEC) 10 MG tablet Take 10 mg by mouth daily       levothyroxine (SYNTHROID/LEVOTHROID) 88 MCG tablet Take 1 tablet (88 mcg) by mouth daily 90 tablet 3     melatonin 3 MG tablet Take 2 mg by mouth nightly as needed for sleep       Multiple Vitamin (MULTIVITAMIN PO) Take by mouth daily        sertraline (ZOLOFT) 25 MG tablet Take 1 tablet (25 mg) by mouth daily 90 tablet 3     Allergies   Allergen Reactions     Seasonal Allergies        Breast Cancer Screening:    FHS-7:   Breast CA Risk Assessment (FHS-7) 4/19/2021 5/3/2022   Did any of your first-degree relatives have breast or ovarian cancer? No No   Did any of your relatives have bilateral breast cancer? No Unknown   Did any man in your family have breast cancer? No No   Did any woman in your family have breast and ovarian cancer? No No   Did any woman  in your family have breast cancer before age 50 y? No No   Do you have 2 or more relatives with breast and/or ovarian cancer? Yes Yes   Do you have 2 or more relatives with breast and/or bowel cancer? Yes No       Patient under 40 years of age: Routine Mammogram Screening not recommended.   Pertinent mammograms are reviewed under the imaging tab.    History of abnormal Pap smear: NO - age 30-65 PAP every 5 years with negative HPV co-testing recommended  PAP / HPV Latest Ref Rng & Units 12/12/2019   PAP (Historical) - NIL   HPV16 NEG:Negative Negative   HPV18 NEG:Negative Negative   HRHPV NEG:Negative Negative     Reviewed and updated as needed this visit by clinical staff   Tobacco  Allergies  Meds   Med Hx  Surg Hx  Fam Hx  Soc Hx          Reviewed and updated as needed this visit by Provider                   History reviewed. No pertinent past medical history.   Past Surgical History:   Procedure Laterality Date     AS MYOMECTOMY 5/>,TOT>250 GMS,ABD APPRCH  02/11/2016     HC TOOTH EXTRACTION W/FORCEP  05/2009       Review of Systems   Constitutional: Negative for chills and fever.   HENT: Negative for congestion, ear pain, hearing loss and sore throat.    Eyes: Negative for pain and visual disturbance.   Respiratory: Negative for cough and shortness of breath.    Cardiovascular: Negative for chest pain, palpitations and peripheral edema.   Gastrointestinal: Negative for abdominal pain, constipation, diarrhea, heartburn, hematochezia and nausea.   Breasts:  Negative for tenderness, breast mass and discharge.   Genitourinary: Negative for dysuria, frequency, genital sores, hematuria, pelvic pain, urgency, vaginal bleeding and vaginal discharge.   Musculoskeletal: Negative for arthralgias, joint swelling and myalgias.   Skin: Negative for rash.   Neurological: Negative for dizziness, weakness, headaches and paresthesias.   Psychiatric/Behavioral: Negative for mood changes. The patient is not nervous/anxious.   "         OBJECTIVE:   /70 (BP Location: Left arm, Patient Position: Sitting, Cuff Size: Adult Regular)   Pulse 64   Temp 97  F (36.1  C) (Tympanic)   Resp 16   Ht 1.558 m (5' 1.34\")   Wt 59.1 kg (130 lb 4 oz)   LMP 04/21/2022   SpO2 100%   BMI 24.34 kg/m    Physical Exam  GENERAL: healthy, alert and no distress  EYES: Eyes grossly normal to inspection, PERRL and conjunctivae and sclerae normal  HENT: ear canals and TM's normal, nose and mouth without ulcers or lesions  NECK: no adenopathy, no asymmetry, masses, or scars and thyroid normal to palpation  RESP: lungs clear to auscultation - no rales, rhonchi or wheezes  BREAST: normal without masses, tenderness or nipple discharge and no palpable axillary masses or adenopathy  CV: regular rate and rhythm, normal S1 S2, no S3 or S4, no murmur, click or rub, no peripheral edema and peripheral pulses strong  ABDOMEN: soft, nontender, no hepatosplenomegaly, no masses and bowel sounds normal  MS: no gross musculoskeletal defects noted, no edema  SKIN: no suspicious lesions or rashes  NEURO: Normal strength and tone, mentation intact and speech normal  PSYCH: mentation appears normal, affect normal/bright        ASSESSMENT/PLAN:   (Z00.00) Routine general medical examination at a health care facility  (primary encounter diagnosis)  Comment: she will RTC after 5/14 for Tdap. She had typhoid vaccine on 4/14 and needs to wait 1 month between.  Plan: REVIEW OF HEALTH MAINTENANCE PROTOCOL ORDERS,         TDAP VACCINE (Adacel, Boostrix)            (F41.9) Anxiety  Comment: refilled.  Plan: sertraline (ZOLOFT) 25 MG tablet            (E03.9) Acquired hypothyroidism  Comment: refilled.   Plan: TSH with free T4 reflex, levothyroxine         (SYNTHROID/LEVOTHROID) 88 MCG tablet            (Z13.6) CARDIOVASCULAR SCREENING; LDL GOAL LESS THAN 160  Comment: she will do fasting lab at later date.  Plan: Lipid panel reflex to direct LDL Fasting            (Z13.1) Screening " "for diabetes mellitus  Comment: she will do fasting lab at later date.  Plan: Glucose                  COUNSELING:  Reviewed preventive health counseling, as reflected in patient instructions       Regular exercise       Healthy diet/nutrition    Estimated body mass index is 24.34 kg/m  as calculated from the following:    Height as of this encounter: 1.558 m (5' 1.34\").    Weight as of this encounter: 59.1 kg (130 lb 4 oz).        She reports that she has never smoked. She has never used smokeless tobacco.      Counseling Resources:  ATP IV Guidelines  Pooled Cohorts Equation Calculator  Breast Cancer Risk Calculator  BRCA-Related Cancer Risk Assessment: FHS-7 Tool  FRAX Risk Assessment  ICSI Preventive Guidelines  Dietary Guidelines for Americans, 2010  USDA's MyPlate  ASA Prophylaxis  Lung CA Screening    Sonya Mckenzie, ESSENCE [facility]  "

## 2022-05-04 ENCOUNTER — THERAPY VISIT (OUTPATIENT)
Dept: PHYSICAL THERAPY | Facility: CLINIC | Age: 32
End: 2022-05-04
Payer: COMMERCIAL

## 2022-05-04 DIAGNOSIS — M25.572 ANKLE PAIN, LEFT: Primary | ICD-10-CM

## 2022-05-04 PROCEDURE — 97140 MANUAL THERAPY 1/> REGIONS: CPT | Mod: GP | Performed by: PHYSICAL THERAPIST

## 2022-05-04 PROCEDURE — 97110 THERAPEUTIC EXERCISES: CPT | Mod: GP | Performed by: PHYSICAL THERAPIST

## 2022-05-04 PROCEDURE — 97530 THERAPEUTIC ACTIVITIES: CPT | Mod: GP | Performed by: PHYSICAL THERAPIST

## 2022-05-09 ENCOUNTER — THERAPY VISIT (OUTPATIENT)
Dept: PHYSICAL THERAPY | Facility: CLINIC | Age: 32
End: 2022-05-09
Payer: COMMERCIAL

## 2022-05-09 DIAGNOSIS — M25.572 ANKLE PAIN, LEFT: Primary | ICD-10-CM

## 2022-05-09 PROCEDURE — 97110 THERAPEUTIC EXERCISES: CPT | Mod: GP | Performed by: PHYSICAL THERAPIST

## 2022-05-16 ENCOUNTER — THERAPY VISIT (OUTPATIENT)
Dept: PHYSICAL THERAPY | Facility: CLINIC | Age: 32
End: 2022-05-16
Payer: COMMERCIAL

## 2022-05-16 DIAGNOSIS — M25.572 ANKLE PAIN, LEFT: Primary | ICD-10-CM

## 2022-05-16 PROCEDURE — 97110 THERAPEUTIC EXERCISES: CPT | Mod: GP | Performed by: PHYSICAL THERAPIST

## 2022-05-19 ENCOUNTER — ALLIED HEALTH/NURSE VISIT (OUTPATIENT)
Dept: FAMILY MEDICINE | Facility: CLINIC | Age: 32
End: 2022-05-19
Payer: COMMERCIAL

## 2022-05-19 DIAGNOSIS — Z00.00 ROUTINE GENERAL MEDICAL EXAMINATION AT A HEALTH CARE FACILITY: ICD-10-CM

## 2022-05-19 PROCEDURE — 90471 IMMUNIZATION ADMIN: CPT

## 2022-05-19 PROCEDURE — 90715 TDAP VACCINE 7 YRS/> IM: CPT

## 2022-05-19 PROCEDURE — 99207 PR NO CHARGE NURSE ONLY: CPT

## 2022-05-23 ENCOUNTER — THERAPY VISIT (OUTPATIENT)
Dept: PHYSICAL THERAPY | Facility: CLINIC | Age: 32
End: 2022-05-23
Payer: COMMERCIAL

## 2022-05-23 DIAGNOSIS — M25.572 ANKLE PAIN, LEFT: Primary | ICD-10-CM

## 2022-05-23 PROCEDURE — 97110 THERAPEUTIC EXERCISES: CPT | Mod: GP | Performed by: PHYSICAL THERAPIST

## 2022-05-23 NOTE — PROGRESS NOTES
"Subjective:  HPI  Physical Exam                    Objective:  System    Physical Exam    General     ROS  12-20-22 Discharge PT as patient did not return.  See PN below for last known status.  Dawn Root PT    PROGRESS  REPORT    Progress reporting period is from 4-20-22 to 5-23-22, 6 visits.       SUBJECTIVE  Patient initially had c/o  \"new pains\" starting about February 2022 in addition to some of her more chronic pains listed below.  New pain was lateral left ankle/foot, lateral lower leg, plantar aspect of heel up to achilles tendon area.  She also had intermittent numbness in the toes on the left.  Pain ranged 0-9/10, described as intermittently \"sharp\" and \"achy\".  She had history of chronic intermittent bilateral arch pain, possibly for up to 1 year and had intermittent chronic history of left>right anterior hip pain/stiffness as well as history of right buttock and posterior thigh pain a few years ago.  At initial evaluation foot/ankle symptoms increased with first steps after sitting (not as bad first thing in a.m.), walking >20', going down stairs, immediately with standing (heel).      Patient has improved overall, but noticeable increase/return of symptoms LB as well as  the left lateral and plantar foot and left hip after not doing her lumbar exercises the last 3 days.  Pain became more frequent  intense.  Walked once last week, 1.5 miles, with onset of left hip pain during and did TRACI.  Abolished pain, but did not last.  Had some toe numbness at the end of the walk, no other numbness this week.  Pain continues lateral foot/ankle up to 7-8/10 with getting out of car/first steps, intermittently left hip (anterior, lateral and/or posterior) typically after ~30' walking, intermittent lateral and plantar foot pain.  She was no longer having pain in the achilles/calf area.  No symptoms with stairs and no longer gets pain immediately with standing (foot/heel) unless getting out of her vehicle.  Patient " is leaving in 2 days for a 3-month trip.     Current Pain level: 3/10 (lumbar).      Initial Pain level:  (0-9/10).   Changes in function:  Yes (See Goal flowsheet attached for changes in current functional level)  Adverse reaction to treatment or activity: None    OBJECTIVE  No trunk shift.  Lumbar extension AROM 75% with slight deviation to the right, flexion fingertips to mid-shin, right SG min limited and left SG WNL (both SG produce discomfort opposite side iliac crest/lumbar).    AROM bilateral hip ER symetrical/WNL, IR mod limtied left/no pain.  MMT hip IR/ER WNL, no pain.  Patient has restricted hamstring length.   Continued negative ankle exam - ROM, MMT, palpation.     ASSESSMENT/PLAN  Updated problem list and treatment plan: Diagnosis 1:  Left ankle and foot pain    Pain -  self management, education, directional preference exercise and home program  Decreased ROM/flexibility - home program  Decreased function - home program  Symptoms foot, hip and back are responsive to movement of the lumbar spine.   STG/LTGs have been met or progress has been made towards goals:  Yes (See Goal flow sheet completed today.)  Assessment of Progress: The patient's condition has potential to improve.  The patient's progress has slowed.  Self Management Plans:  Patient is independent in a home treatment program.      Recommendations:  Hold PT chart open for now due to patient leaving on trip.  Patient may use [employer] to report progress.      Please refer to the daily flowsheet for treatment today, total treatment time and time spent performing 1:1 timed codes.

## 2022-11-19 ENCOUNTER — HEALTH MAINTENANCE LETTER (OUTPATIENT)
Age: 32
End: 2022-11-19

## 2023-07-02 ENCOUNTER — HEALTH MAINTENANCE LETTER (OUTPATIENT)
Age: 33
End: 2023-07-02

## 2023-07-10 ENCOUNTER — OFFICE VISIT (OUTPATIENT)
Dept: FAMILY MEDICINE | Facility: CLINIC | Age: 33
End: 2023-07-10
Payer: COMMERCIAL

## 2023-07-10 VITALS
OXYGEN SATURATION: 98 % | HEIGHT: 61 IN | RESPIRATION RATE: 20 BRPM | SYSTOLIC BLOOD PRESSURE: 105 MMHG | BODY MASS INDEX: 25.53 KG/M2 | HEART RATE: 65 BPM | DIASTOLIC BLOOD PRESSURE: 87 MMHG | TEMPERATURE: 98 F | WEIGHT: 135.2 LBS

## 2023-07-10 DIAGNOSIS — Z11.3 ROUTINE SCREENING FOR STI (SEXUALLY TRANSMITTED INFECTION): ICD-10-CM

## 2023-07-10 DIAGNOSIS — Z00.00 ROUTINE GENERAL MEDICAL EXAMINATION AT A HEALTH CARE FACILITY: Primary | ICD-10-CM

## 2023-07-10 DIAGNOSIS — D50.9 IRON DEFICIENCY ANEMIA, UNSPECIFIED IRON DEFICIENCY ANEMIA TYPE: ICD-10-CM

## 2023-07-10 DIAGNOSIS — E03.9 ACQUIRED HYPOTHYROIDISM: ICD-10-CM

## 2023-07-10 DIAGNOSIS — R53.83 FATIGUE, UNSPECIFIED TYPE: ICD-10-CM

## 2023-07-10 LAB
BASOPHILS # BLD AUTO: 0 10E3/UL (ref 0–0.2)
BASOPHILS NFR BLD AUTO: 0 %
C TRACH DNA SPEC QL NAA+PROBE: NEGATIVE
EOSINOPHIL # BLD AUTO: 0.1 10E3/UL (ref 0–0.7)
EOSINOPHIL NFR BLD AUTO: 1 %
ERYTHROCYTE [DISTWIDTH] IN BLOOD BY AUTOMATED COUNT: 11.7 % (ref 10–15)
FERRITIN SERPL-MCNC: 55 NG/ML (ref 6–175)
HBV SURFACE AG SERPL QL IA: NONREACTIVE
HCT VFR BLD AUTO: 42.9 % (ref 35–47)
HCV AB SERPL QL IA: NONREACTIVE
HGB BLD-MCNC: 14.4 G/DL (ref 11.7–15.7)
HIV 1+2 AB+HIV1 P24 AG SERPL QL IA: NONREACTIVE
HSV1 IGG SERPL QL IA: 0.22 INDEX
HSV1 IGG SERPL QL IA: NORMAL
HSV2 IGG SERPL QL IA: 0.86 INDEX
HSV2 IGG SERPL QL IA: NORMAL
IMM GRANULOCYTES # BLD: 0 10E3/UL
IMM GRANULOCYTES NFR BLD: 0 %
IRON BINDING CAPACITY (ROCHE): 343 UG/DL (ref 240–430)
IRON SATN MFR SERPL: 55 % (ref 15–46)
IRON SERPL-MCNC: 187 UG/DL (ref 37–145)
LYMPHOCYTES # BLD AUTO: 1.6 10E3/UL (ref 0.8–5.3)
LYMPHOCYTES NFR BLD AUTO: 38 %
MCH RBC QN AUTO: 30.8 PG (ref 26.5–33)
MCHC RBC AUTO-ENTMCNC: 33.6 G/DL (ref 31.5–36.5)
MCV RBC AUTO: 92 FL (ref 78–100)
MONOCYTES # BLD AUTO: 0.3 10E3/UL (ref 0–1.3)
MONOCYTES NFR BLD AUTO: 8 %
N GONORRHOEA DNA SPEC QL NAA+PROBE: NEGATIVE
NEUTROPHILS # BLD AUTO: 2.2 10E3/UL (ref 1.6–8.3)
NEUTROPHILS NFR BLD AUTO: 53 %
PLATELET # BLD AUTO: 235 10E3/UL (ref 150–450)
RBC # BLD AUTO: 4.67 10E6/UL (ref 3.8–5.2)
T PALLIDUM AB SER QL: NONREACTIVE
TSH SERPL DL<=0.005 MIU/L-ACNC: 2.27 UIU/ML (ref 0.3–4.2)
WBC # BLD AUTO: 4.2 10E3/UL (ref 4–11)

## 2023-07-10 PROCEDURE — 86780 TREPONEMA PALLIDUM: CPT | Performed by: NURSE PRACTITIONER

## 2023-07-10 PROCEDURE — 83550 IRON BINDING TEST: CPT | Performed by: NURSE PRACTITIONER

## 2023-07-10 PROCEDURE — 87491 CHLMYD TRACH DNA AMP PROBE: CPT | Performed by: NURSE PRACTITIONER

## 2023-07-10 PROCEDURE — 87389 HIV-1 AG W/HIV-1&-2 AB AG IA: CPT | Performed by: NURSE PRACTITIONER

## 2023-07-10 PROCEDURE — 86803 HEPATITIS C AB TEST: CPT | Performed by: NURSE PRACTITIONER

## 2023-07-10 PROCEDURE — 82306 VITAMIN D 25 HYDROXY: CPT | Performed by: NURSE PRACTITIONER

## 2023-07-10 PROCEDURE — 83540 ASSAY OF IRON: CPT | Performed by: NURSE PRACTITIONER

## 2023-07-10 PROCEDURE — 87340 HEPATITIS B SURFACE AG IA: CPT | Performed by: NURSE PRACTITIONER

## 2023-07-10 PROCEDURE — 82728 ASSAY OF FERRITIN: CPT | Performed by: NURSE PRACTITIONER

## 2023-07-10 PROCEDURE — 99395 PREV VISIT EST AGE 18-39: CPT | Performed by: NURSE PRACTITIONER

## 2023-07-10 PROCEDURE — 86696 HERPES SIMPLEX TYPE 2 TEST: CPT | Performed by: NURSE PRACTITIONER

## 2023-07-10 PROCEDURE — 86695 HERPES SIMPLEX TYPE 1 TEST: CPT | Performed by: NURSE PRACTITIONER

## 2023-07-10 PROCEDURE — 36415 COLL VENOUS BLD VENIPUNCTURE: CPT | Performed by: NURSE PRACTITIONER

## 2023-07-10 PROCEDURE — 80053 COMPREHEN METABOLIC PANEL: CPT | Performed by: NURSE PRACTITIONER

## 2023-07-10 PROCEDURE — 87591 N.GONORRHOEAE DNA AMP PROB: CPT | Performed by: NURSE PRACTITIONER

## 2023-07-10 PROCEDURE — 84443 ASSAY THYROID STIM HORMONE: CPT | Performed by: NURSE PRACTITIONER

## 2023-07-10 PROCEDURE — 85025 COMPLETE CBC W/AUTO DIFF WBC: CPT | Performed by: NURSE PRACTITIONER

## 2023-07-10 ASSESSMENT — ENCOUNTER SYMPTOMS
HEADACHES: 0
CHILLS: 0
ABDOMINAL PAIN: 0
SORE THROAT: 0
PALPITATIONS: 0
WEAKNESS: 1
PARESTHESIAS: 0
ARTHRALGIAS: 0
HEMATURIA: 0
COUGH: 0
EYE PAIN: 0
SHORTNESS OF BREATH: 0
DYSURIA: 0
HEMATOCHEZIA: 0
MYALGIAS: 0
BREAST MASS: 0
FREQUENCY: 0
FEVER: 0
CONSTIPATION: 0
NERVOUS/ANXIOUS: 0
DIZZINESS: 1
NAUSEA: 0
JOINT SWELLING: 0
HEARTBURN: 0
DIARRHEA: 0

## 2023-07-10 ASSESSMENT — PAIN SCALES - GENERAL: PAINLEVEL: MILD PAIN (2)

## 2023-07-10 NOTE — PATIENT INSTRUCTIONS
At [facility], we strive to deliver an exceptional experience to you, every time we see you. If you receive a survey, please complete it as we do value your feedback.  If you have MyChart, you can expect to receive results automatically within 24 hours of their completion.  Your provider will send a note interpreting your results as well.   If you do not have MyChart, you should receive your results in about a week by mail.    Your care team:                            Family Medicine Internal Medicine   MD Ramon Hollis MD Shantel Branch-Fleming, MD Srinivasa Vaka, MD Katya Belousova, KELL MaynardHillESSENCE Brumfield CNP, MD Pediatrics   Stone Espinoza, MD Dena Ingram MD Amelia Massimini APRN CNP   Marta Vaca, APRN CNP MD Amparo Forbes MD          Clinic hours: Monday - Thursday 7 am-6 pm; Fridays 7 am-5 pm.   Urgent care: Monday - Friday 10 am- 8 pm; Saturday and Sunday 9 am-5 pm.    Clinic: [phone]       [facility]: Monday - Thursday 8 am - 7 pm; Friday 8 am - 6 pm  [facility]: [phone]     Preventive Health Recommendations  Female Ages 26 - 39  Yearly exam:   See your health care provider every year in order to    Review health changes.     Discuss preventive care.      Review your medicines if you your doctor has prescribed any.    Until age 30: Get a Pap test every three years (more often if you have had an abnormal result).    After age 30: Talk to your doctor about whether you should have a Pap test every 3 years or have a Pap test with HPV screening every 5 years.   You do not need a Pap test if your uterus was removed (hysterectomy) and you have not had cancer.  You should be tested each year for STDs (sexually transmitted diseases), if you're at risk.   Talk to your provider about how often to have your cholesterol  checked.  If you are at risk for diabetes, you should have a diabetes test (fasting glucose).  Shots: Get a flu shot each year. Get a tetanus shot every 10 years.   Nutrition:     Eat at least 5 servings of fruits and vegetables each day.    Eat whole-grain bread, whole-wheat pasta and brown rice instead of white grains and rice.    Get adequate Calcium and Vitamin D.     Lifestyle    Exercise at least 150 minutes a week (30 minutes a day, 5 days of the week). This will help you control your weight and prevent disease.    Limit alcohol to one drink per day.    No smoking.     Wear sunscreen to prevent skin cancer.    See your dentist every six months for an exam and cleaning.

## 2023-07-10 NOTE — PROGRESS NOTES
SUBJECTIVE:   CC: Corrina is an 32 year old who presents for preventive health visit.       7/10/2023     9:46 AM   Additional Questions   Roomed by rob lowery   Accompanied by none         7/10/2023     9:46 AM   Patient Reported Additional Medications   Patient reports taking the following new medications none     Healthy Habits:     Getting at least 3 servings of Calcium per day:  Yes    Bi-annual eye exam:  NO    Dental care twice a year:  Yes    Sleep apnea or symptoms of sleep apnea:  Daytime drowsiness    Diet:  Regular (no restrictions)    Frequency of exercise:  1 day/week    Duration of exercise:  Less than 15 minutes    Taking medications regularly:  Yes    Barriers to taking medications:  None    Medication side effects:  Not applicable    Additional concerns today:  Yes      Today's PHQ-2 Score:       7/10/2023     9:48 AM   PHQ-2 ( 1999 [employer])   Q1: Little interest or pleasure in doing things 0   Q2: Feeling down, depressed or hopeless 1   PHQ-2 Score 1   Q1: Little interest or pleasure in doing things Not at all   Q2: Feeling down, depressed or hopeless Several days   PHQ-2 Score 1       Donated blood in Aug and then oct  Very heavy periods  Feb 7 - low ferritin taking supplements  March 7 IUD placed - periods lighter now    Stopped sertraline 1 month ago. Going well          Hypothyroidism Follow-up      Since last visit, patient describes the following symptoms: Weight stable, no hair loss, no skin changes, no constipation, no loose stools and fatigue        Social History     Tobacco Use     Smoking status: Never     Smokeless tobacco: Never   Substance Use Topics     Alcohol use: Yes     Comment: 2 drinks or less per week             7/10/2023     9:48 AM   Alcohol Use   Prescreen: >3 drinks/day or >7 drinks/week? No     Reviewed orders with patient.  Reviewed health maintenance and updated orders accordingly - Yes  Labs reviewed in EPIC  BP Readings from Last 3 Encounters:   07/10/23 105/87    22 113/70   22 106/69    Wt Readings from Last 3 Encounters:   07/10/23 61.3 kg (135 lb 3.2 oz)   22 59.1 kg (130 lb 4 oz)   22 60.7 kg (133 lb 14.4 oz)                  Patient Active Problem List   Diagnosis     Acquired hypothyroidism     CARDIOVASCULAR SCREENING; LDL GOAL LESS THAN 160     Ankle pain, left     Past Surgical History:   Procedure Laterality Date     AS MYOMECTOMY 5/>,TOT>250 GMS,ABD APPRCH  2016     HC TOOTH EXTRACTION W/FORCEP  2009       Social History     Tobacco Use     Smoking status: Never     Smokeless tobacco: Never   Substance Use Topics     Alcohol use: Yes     Comment: 2 drinks or less per week     Family History   Problem Relation Age of Onset     Hypertension Father      Alcoholism Father      Hepatitis Father         C     Cancer Maternal Grandfather      Hypothyroidism Paternal Grandmother      Cancer Paternal Grandfather      Hypothyroidism Paternal Aunt          Current Outpatient Medications   Medication Sig Dispense Refill     CALCIUM-VITAMIN D PO Take by mouth daily        cetirizine (ZYRTEC) 10 MG tablet Take 10 mg by mouth daily       levothyroxine (SYNTHROID/LEVOTHROID) 88 MCG tablet Take 1 tablet (88 mcg) by mouth daily NEEDS VISIT 90 tablet 0     melatonin 3 MG tablet Take 2 mg by mouth nightly as needed for sleep       Multiple Vitamin (MULTIVITAMIN PO) Take by mouth daily        Allergies   Allergen Reactions     Seasonal Allergies        Breast Cancer Screenin/19/2021     9:31 PM 5/3/2022     6:54 AM 7/10/2023     9:49 AM   Breast CA Risk Assessment (FHS-7)   Do you have a family history of breast, colon, or ovarian cancer? Yes Yes No / Unknown         Patient under 40 years of age: Routine Mammogram Screening not recommended.   Pertinent mammograms are reviewed under the imaging tab.    History of abnormal Pap smear: NO - age 30-65 PAP every 5 years with negative HPV co-testing recommended      Latest Ref Rng & Units  "12/12/2019    11:14 AM 12/12/2019    10:45 AM   PAP / HPV   PAP (Historical)  NIL     HPV 16 DNA NEG^Negative  Negative    HPV 18 DNA NEG^Negative  Negative    Other HR HPV NEG^Negative  Negative      Reviewed and updated as needed this visit by clinical staff   Tobacco  Allergies  Meds  Problems  Med Hx  Surg Hx  Fam Hx          Reviewed and updated as needed this visit by Provider   Tobacco  Allergies  Meds  Problems  Med Hx  Surg Hx  Fam Hx         History reviewed. No pertinent past medical history.   Past Surgical History:   Procedure Laterality Date     AS MYOMECTOMY 5/>,TOT>250 GMS,ABD APPRCH  02/11/2016     HC TOOTH EXTRACTION W/FORCEP  05/2009     OB History   No obstetric history on file.       Review of Systems   Constitutional: Negative for chills and fever.   HENT: Negative for congestion, ear pain, hearing loss and sore throat.    Eyes: Negative for pain and visual disturbance.   Respiratory: Negative for cough and shortness of breath.    Cardiovascular: Negative for chest pain, palpitations and peripheral edema.   Gastrointestinal: Negative for abdominal pain, constipation, diarrhea, heartburn, hematochezia and nausea.   Breasts:  Negative for tenderness, breast mass and discharge.   Genitourinary: Negative for dysuria, frequency, genital sores, hematuria, pelvic pain, urgency, vaginal bleeding and vaginal discharge.   Musculoskeletal: Negative for arthralgias, joint swelling and myalgias.   Skin: Negative for rash.   Neurological: Positive for dizziness and weakness. Negative for headaches and paresthesias.   Psychiatric/Behavioral: Negative for mood changes. The patient is not nervous/anxious.           OBJECTIVE:   /87   Pulse 65   Temp 98  F (36.7  C) (Oral)   Resp 20   Ht 1.558 m (5' 1.34\")   Wt 61.3 kg (135 lb 3.2 oz)   LMP 07/06/2023 (Exact Date)   SpO2 98%   BMI 25.26 kg/m    Physical Exam  GENERAL: healthy, alert and no distress  EYES: Eyes grossly normal to " inspection, PERRL and conjunctivae and sclerae normal  HENT: ear canals and TM's normal, nose and mouth without ulcers or lesions  NECK: no adenopathy, no asymmetry, masses, or scars and thyroid normal to palpation  RESP: lungs clear to auscultation - no rales, rhonchi or wheezes  BREAST: normal without masses, tenderness or nipple discharge and no palpable axillary masses or adenopathy  CV: regular rate and rhythm, normal S1 S2, no S3 or S4, no murmur, click or rub, no peripheral edema and peripheral pulses strong  ABDOMEN: soft, nontender, no hepatosplenomegaly, no masses and bowel sounds normal  MS: no gross musculoskeletal defects noted, no edema  SKIN: no suspicious lesions or rashes  NEURO: Normal strength and tone, mentation intact and speech normal  PSYCH: mentation appears normal, affect normal/bright    Diagnostic Test Results:  Labs reviewed in Epic  Results for orders placed or performed in visit on 07/10/23   TSH WITH FREE T4 REFLEX     Status: Normal   Result Value Ref Range    TSH 2.27 0.30 - 4.20 uIU/mL   Ferritin     Status: Normal   Result Value Ref Range    Ferritin 55 6 - 175 ng/mL   Iron and iron binding capacity     Status: Abnormal   Result Value Ref Range    Iron 187 (H) 37 - 145 ug/dL    Iron Binding Capacity 343 240 - 430 ug/dL    Iron Sat Index 55 (H) 15 - 46 %   HIV Antigen Antibody Combo     Status: Normal   Result Value Ref Range    HIV Antigen Antibody Combo Nonreactive Nonreactive   Hepatitis B surface antigen     Status: Normal   Result Value Ref Range    Hepatitis B Surface Antigen Nonreactive Nonreactive   Hepatitis C Screen Reflex to HCV RNA Quant and Genotype     Status: Normal   Result Value Ref Range    Hepatitis C Antibody Nonreactive Nonreactive    Narrative    Assay performance characteristics have not been established for newborns, infants, and children.   Treponema Abs w Reflex to RPR and Titer     Status: Normal   Result Value Ref Range    Treponema Antibody Total  Nonreactive Nonreactive   Herpes Simplex Virus 1 and 2 IgG     Status: Normal   Result Value Ref Range    HSV Type 1 IgG Instrument Value 0.22 <0.90 Index    Herpes Simplex Virus Type 1 IgG Antibody No HSV-1 IgG antibodies detected. No HSV-1 IgG antibodies detected    HSV Type 2 IgG Instrument Value 0.86 <0.90 Index    Herpes Simplex Virus Type 2 IgG Antibody No HSV-2 IgG antibodies detected. No HSV-2 IgG antibodies detected   CBC with platelets and differential     Status: None   Result Value Ref Range    WBC Count 4.2 4.0 - 11.0 10e3/uL    RBC Count 4.67 3.80 - 5.20 10e6/uL    Hemoglobin 14.4 11.7 - 15.7 g/dL    Hematocrit 42.9 35.0 - 47.0 %    MCV 92 78 - 100 fL    MCH 30.8 26.5 - 33.0 pg    MCHC 33.6 31.5 - 36.5 g/dL    RDW 11.7 10.0 - 15.0 %    Platelet Count 235 150 - 450 10e3/uL    % Neutrophils 53 %    % Lymphocytes 38 %    % Monocytes 8 %    % Eosinophils 1 %    % Basophils 0 %    % Immature Granulocytes 0 %    Absolute Neutrophils 2.2 1.6 - 8.3 10e3/uL    Absolute Lymphocytes 1.6 0.8 - 5.3 10e3/uL    Absolute Monocytes 0.3 0.0 - 1.3 10e3/uL    Absolute Eosinophils 0.1 0.0 - 0.7 10e3/uL    Absolute Basophils 0.0 0.0 - 0.2 10e3/uL    Absolute Immature Granulocytes 0.0 <=0.4 10e3/uL   Chlamydia trachomatis PCR     Status: Normal    Specimen: Urine, Voided   Result Value Ref Range    Chlamydia trachomatis Negative Negative   Neisseria gonorrhoeae PCR - Clinic Collect     Status: Normal    Specimen: Urine, Voided   Result Value Ref Range    Neisseria gonorrhoeae Negative Negative   CBC with platelets and differential     Status: None    Narrative    The following orders were created for panel order CBC with platelets and differential.  Procedure                               Abnormality         Status                     ---------                               -----------         ------                     CBC with platelets and d...[[id number]]                      Final result                 Please view  results for these tests on the individual orders.       ASSESSMENT/PLAN:   (Z00.00) Routine general medical examination at a health care facility  (primary encounter diagnosis)    (E03.9) Acquired hypothyroidism  Comment: stable. Refilled.   Plan: TSH WITH FREE T4 REFLEX, levothyroxine         (SYNTHROID/LEVOTHROID) 88 MCG tablet            (D50.9) Iron deficiency anemia, unspecified iron deficiency anemia type  Comment: rechecking  Plan: CBC with platelets and differential, Ferritin,         Iron and iron binding capacity            (Z11.3) Routine screening for STI (sexually transmitted infection)  Comment: asymptomatic  Plan: Chlamydia trachomatis PCR, Neisseria         gonorrhoeae PCR - Clinic Collect, HIV Antigen         Antibody Combo, Hepatitis B surface antigen,         Hepatitis C Screen Reflex to HCV RNA Quant and         Genotype, Treponema Abs w Reflex to RPR and         Titer, Herpes Simplex Virus 1 and 2 IgG                    COUNSELING:  Reviewed preventive health counseling, as reflected in patient instructions       Regular exercise       Healthy diet/nutrition        She reports that she has never smoked. She has never used smokeless tobacco.    The benefits, risks and potential side effects were discussed in detail. Black box warnings discussed as relevant. All patient questions were answered. The patient was instructed to follow up immediately if any adverse reactions develop.    Return precautions discussed, including when to seek urgent/emergent care.    Patient verbalizes understanding and agrees with plan of care. Patient stable for discharge.          ESSENCE ODONNELL CNP  [facility]

## 2023-07-11 RX ORDER — LEVOTHYROXINE SODIUM 88 UG/1
88 TABLET ORAL DAILY
Qty: 90 TABLET | Refills: 3 | Status: SHIPPED | OUTPATIENT
Start: 2023-07-11 | End: 2023-11-14

## 2023-07-12 DIAGNOSIS — E83.52 SERUM CALCIUM ELEVATED: Primary | ICD-10-CM

## 2023-07-12 LAB
ALBUMIN SERPL BCG-MCNC: 5.4 G/DL (ref 3.5–5.2)
ALP SERPL-CCNC: 41 U/L (ref 35–104)
ALT SERPL W P-5'-P-CCNC: 14 U/L (ref 0–50)
ANION GAP SERPL CALCULATED.3IONS-SCNC: 17 MMOL/L (ref 7–15)
AST SERPL W P-5'-P-CCNC: 22 U/L (ref 0–45)
BILIRUB SERPL-MCNC: 0.9 MG/DL
BUN SERPL-MCNC: 12.4 MG/DL (ref 6–20)
CALCIUM SERPL-MCNC: 10.7 MG/DL (ref 8.6–10)
CHLORIDE SERPL-SCNC: 102 MMOL/L (ref 98–107)
CREAT SERPL-MCNC: 0.78 MG/DL (ref 0.51–0.95)
DEPRECATED HCO3 PLAS-SCNC: 22 MMOL/L (ref 22–29)
GFR SERPL CREATININE-BSD FRML MDRD: >90 ML/MIN/1.73M2
GLUCOSE SERPL-MCNC: 61 MG/DL (ref 70–99)
POTASSIUM SERPL-SCNC: 4.2 MMOL/L (ref 3.4–5.3)
PROT SERPL-MCNC: 8.1 G/DL (ref 6.4–8.3)
SODIUM SERPL-SCNC: 141 MMOL/L (ref 136–145)

## 2023-07-13 ENCOUNTER — LAB (OUTPATIENT)
Dept: LAB | Facility: CLINIC | Age: 33
End: 2023-07-13
Payer: COMMERCIAL

## 2023-07-13 DIAGNOSIS — E83.52 SERUM CALCIUM ELEVATED: ICD-10-CM

## 2023-07-13 LAB
CA-I BLD-MCNC: 5 MG/DL (ref 4.4–5.2)
CALCIUM SERPL-MCNC: 9.9 MG/DL (ref 8.6–10)
DEPRECATED CALCIDIOL+CALCIFEROL SERPL-MC: 42 UG/L (ref 20–75)
PTH-INTACT SERPL-MCNC: 38 PG/ML (ref 15–65)

## 2023-07-13 PROCEDURE — 83970 ASSAY OF PARATHORMONE: CPT | Performed by: PATHOLOGY

## 2023-07-13 PROCEDURE — 82330 ASSAY OF CALCIUM: CPT | Performed by: PATHOLOGY

## 2023-07-13 PROCEDURE — 36415 COLL VENOUS BLD VENIPUNCTURE: CPT | Performed by: PATHOLOGY

## 2023-07-13 PROCEDURE — 82310 ASSAY OF CALCIUM: CPT | Performed by: PATHOLOGY

## 2023-07-23 ENCOUNTER — ANCILLARY PROCEDURE (OUTPATIENT)
Dept: CARDIOLOGY | Facility: CLINIC | Age: 33
End: 2023-07-23
Attending: NURSE PRACTITIONER
Payer: COMMERCIAL

## 2023-07-23 DIAGNOSIS — R53.83 FATIGUE, UNSPECIFIED TYPE: ICD-10-CM

## 2023-07-23 DIAGNOSIS — R00.2 PALPITATIONS: ICD-10-CM

## 2023-07-23 LAB — LVEF ECHO: NORMAL

## 2023-07-23 PROCEDURE — 93306 TTE W/DOPPLER COMPLETE: CPT | Performed by: STUDENT IN AN ORGANIZED HEALTH CARE EDUCATION/TRAINING PROGRAM

## 2023-07-27 ENCOUNTER — ANCILLARY PROCEDURE (OUTPATIENT)
Dept: CARDIOLOGY | Facility: CLINIC | Age: 33
End: 2023-07-27
Attending: NURSE PRACTITIONER
Payer: COMMERCIAL

## 2023-07-27 DIAGNOSIS — R53.83 FATIGUE, UNSPECIFIED TYPE: ICD-10-CM

## 2023-07-27 DIAGNOSIS — R00.2 PALPITATIONS: ICD-10-CM

## 2023-07-27 PROCEDURE — 93248 EXT ECG>7D<15D REV&INTERPJ: CPT | Performed by: NURSE PRACTITIONER

## 2023-07-27 PROCEDURE — 93246 EXT ECG>7D<15D RECORDING: CPT | Performed by: NURSE PRACTITIONER

## 2023-07-27 NOTE — PATIENT INSTRUCTIONS
Patient has been prescribed a ZioPatch holter for 7 days.  Patient was instructed regarding the indication, function, care and prompt return of the ZioPatch holter monitor. The monitor, with S/N [id number],  was placed on the patient with instructions regarding care of the skin, electrodes, and monitor, as well as documentation in the patient diary. Patient demonstrated understanding of this information and agreed to call [employer] with further questions or concerns.

## 2023-08-29 NOTE — RESULT ENCOUNTER NOTE
Ms. Beasley,    Your holter monitor was normal.  You noted symptoms during normal heart function.    Please contact the clinic if you have additional questions.  Thank you.    Sincerely,    Riana Henson MD

## 2023-09-21 ENCOUNTER — TRANSCRIBE ORDERS (OUTPATIENT)
Dept: OTHER | Age: 33
End: 2023-09-21

## 2023-09-21 DIAGNOSIS — R41.89 BRAIN FOG: ICD-10-CM

## 2023-09-21 DIAGNOSIS — R26.89 IMBALANCE: Primary | ICD-10-CM

## 2023-11-14 ENCOUNTER — VIRTUAL VISIT (OUTPATIENT)
Dept: FAMILY MEDICINE | Facility: CLINIC | Age: 33
End: 2023-11-14
Payer: COMMERCIAL

## 2023-11-14 DIAGNOSIS — R42 DIZZINESS: ICD-10-CM

## 2023-11-14 DIAGNOSIS — R53.83 FATIGUE, UNSPECIFIED TYPE: Primary | ICD-10-CM

## 2023-11-14 DIAGNOSIS — E03.9 ACQUIRED HYPOTHYROIDISM: ICD-10-CM

## 2023-11-14 PROBLEM — M25.572 ANKLE PAIN, LEFT: Status: RESOLVED | Noted: 2022-04-20 | Resolved: 2023-11-14

## 2023-11-14 PROBLEM — N92.0 MENORRHAGIA WITH REGULAR CYCLE: Status: ACTIVE | Noted: 2023-02-07

## 2023-11-14 PROCEDURE — 99213 OFFICE O/P EST LOW 20 MIN: CPT | Mod: 93 | Performed by: NURSE PRACTITIONER

## 2023-11-14 NOTE — PROGRESS NOTES
Corrina is a 33 year old who is being evaluated via a billable telephone visit.    How would you like to obtain your AVS? MyChart  If the video visit is dropped, the invitation should be resent by: Text to cell phone: [phone]  Will anyone else be joining your video visit? No            ICD-10-CM    1. Fatigue, unspecified type  R53.83 Anti Nuclear Rashida IgG by IFA with Reflex     Rheumatoid factor     ESR: Erythrocyte sedimentation rate     Tissue transglutaminase rashida IgA and IgG     Adult Post Covid Clinic  Referral      2. Acquired hypothyroidism  E03.9 TSH     T4, free      3. Dizziness  R42 Adult Post Covid Clinic  Referral        We will rule out underlying causes of fatigue including autoimmune disease, inflammation, celiac.  She also has a history of hypothyroidism.  We will check TSH and T4 free.  Will refer to post-COVID clinic for further evaluation of long COVID symptoms.  She is content with the plan.    Subjective   Corrina is a 33 year old, presenting for the following health issues:  Patient presents today with concerns of lightheadedness and dizziness for 1 year.  Patient had COVID June 2022.  She has been seen numerous specialists since.  She complains of issues with balance.  She has significant fatigue which has been impacting her daily activities.  She also experiences brain fog and blurred vision.  She has been seeing specialist through [facility] including neurology, physical therapy, ENT.  She has had a brain MRI which was unremarkable.  She was told that she does not have inner ear dysfunction.  She denies any joint pain or body aches.  She has not had any unusual rashes.  She denies any abdominal pain or discomfort.  She denies constipation or diarrhea.  She has a history of iron deficiency anemia noted in February.  She did take an iron supplement until July when her labs were normal.  She is concerned she may have long COVID and would like to be referred to the long  COVID clinic.  She has a history of hypothyroidism and is not taking medication.  Last TSH was 2.27 on 7/10/2023.        No chief complaint on file.        7/10/2023     9:46 AM   Additional Questions   Roomed by rob lowery   Accompanied by none       History of Present Illness       Reason for visit:  I think i have long covid and am interested in going to a covid clinic  Symptom onset:  More than a month    She eats 2-3 servings of fruits and vegetables daily.She consumes 1 sweetened beverage(s) daily.She exercises with enough effort to increase her heart rate 9 or less minutes per day.  She exercises with enough effort to increase her heart rate 3 or less days per week.   She is taking medications regularly.         Review of Systems   Constitutional, HEENT, cardiovascular, pulmonary, gi and gu systems are negative, except as otherwise noted.      Objective           Vitals:  No vitals were obtained today due to virtual visit.    Physical Exam   GENERAL: Healthy, alert and no distress  EYES: Eyes grossly normal to inspection.  No discharge or erythema, or obvious scleral/conjunctival abnormalities.  RESP: No audible wheeze, cough, or visible cyanosis.  No visible retractions or increased work of breathing.    SKIN: Visible skin clear. No significant rash, abnormal pigmentation or lesions.  NEURO: Cranial nerves grossly intact.  Mentation and speech appropriate for age.  PSYCH: Mentation appears normal, affect normal/bright, judgement and insight intact, normal speech and appearance well-groomed.      Phone clal duration: 7 minutes    Originating Location (pt. Location): Home    Distant Location (provider location):  On-site

## 2023-11-27 ENCOUNTER — LAB (OUTPATIENT)
Dept: LAB | Facility: CLINIC | Age: 33
End: 2023-11-27
Payer: COMMERCIAL

## 2023-11-27 DIAGNOSIS — E03.9 ACQUIRED HYPOTHYROIDISM: ICD-10-CM

## 2023-11-27 DIAGNOSIS — R53.83 FATIGUE, UNSPECIFIED TYPE: ICD-10-CM

## 2023-11-27 LAB
ERYTHROCYTE [SEDIMENTATION RATE] IN BLOOD BY WESTERGREN METHOD: 8 MM/HR (ref 0–20)
RHEUMATOID FACT SERPL-ACNC: <10 IU/ML
T4 FREE SERPL-MCNC: 1.83 NG/DL (ref 0.9–1.7)
TSH SERPL DL<=0.005 MIU/L-ACNC: 0.88 UIU/ML (ref 0.3–4.2)

## 2023-11-27 PROCEDURE — 86038 ANTINUCLEAR ANTIBODIES: CPT

## 2023-11-27 PROCEDURE — 86364 TISS TRNSGLTMNASE EA IG CLAS: CPT

## 2023-11-27 PROCEDURE — 85652 RBC SED RATE AUTOMATED: CPT

## 2023-11-27 PROCEDURE — 84443 ASSAY THYROID STIM HORMONE: CPT

## 2023-11-27 PROCEDURE — 36415 COLL VENOUS BLD VENIPUNCTURE: CPT

## 2023-11-27 PROCEDURE — 86431 RHEUMATOID FACTOR QUANT: CPT

## 2023-11-27 PROCEDURE — 84439 ASSAY OF FREE THYROXINE: CPT

## 2023-11-28 LAB
ANA SER QL IF: NEGATIVE
TTG IGA SER-ACNC: <0.2 U/ML
TTG IGG SER-ACNC: <0.6 U/ML

## 2023-12-11 ASSESSMENT — ANXIETY QUESTIONNAIRES
GAD7 TOTAL SCORE: 5
3. WORRYING TOO MUCH ABOUT DIFFERENT THINGS: SEVERAL DAYS
4. TROUBLE RELAXING: SEVERAL DAYS
1. FEELING NERVOUS, ANXIOUS, OR ON EDGE: SEVERAL DAYS
7. FEELING AFRAID AS IF SOMETHING AWFUL MIGHT HAPPEN: NOT AT ALL
IF YOU CHECKED OFF ANY PROBLEMS ON THIS QUESTIONNAIRE, HOW DIFFICULT HAVE THESE PROBLEMS MADE IT FOR YOU TO DO YOUR WORK, TAKE CARE OF THINGS AT HOME, OR GET ALONG WITH OTHER PEOPLE: SOMEWHAT DIFFICULT
2. NOT BEING ABLE TO STOP OR CONTROL WORRYING: NOT AT ALL
GAD7 TOTAL SCORE: 5
5. BEING SO RESTLESS THAT IT IS HARD TO SIT STILL: NOT AT ALL
6. BECOMING EASILY ANNOYED OR IRRITABLE: MORE THAN HALF THE DAYS

## 2023-12-11 ASSESSMENT — PATIENT HEALTH QUESTIONNAIRE - PHQ9
SUM OF ALL RESPONSES TO PHQ QUESTIONS 1-9: 8
10. IF YOU CHECKED OFF ANY PROBLEMS, HOW DIFFICULT HAVE THESE PROBLEMS MADE IT FOR YOU TO DO YOUR WORK, TAKE CARE OF THINGS AT HOME, OR GET ALONG WITH OTHER PEOPLE: SOMEWHAT DIFFICULT
SUM OF ALL RESPONSES TO PHQ QUESTIONS 1-9: 8

## 2023-12-14 ENCOUNTER — VIRTUAL VISIT (OUTPATIENT)
Dept: PHYSICAL MEDICINE AND REHAB | Facility: CLINIC | Age: 33
End: 2023-12-14
Attending: NURSE PRACTITIONER
Payer: COMMERCIAL

## 2023-12-14 ENCOUNTER — LAB (OUTPATIENT)
Dept: LAB | Facility: CLINIC | Age: 33
End: 2023-12-14
Payer: COMMERCIAL

## 2023-12-14 DIAGNOSIS — Z74.09 POST-COVID CHRONIC DECREASED MOBILITY AND ENDURANCE: ICD-10-CM

## 2023-12-14 DIAGNOSIS — R41.840 POST-COVID CHRONIC CONCENTRATION DEFICIT: ICD-10-CM

## 2023-12-14 DIAGNOSIS — E03.9 ACQUIRED HYPOTHYROIDISM: ICD-10-CM

## 2023-12-14 DIAGNOSIS — U09.9 POST-COVID CHRONIC FATIGUE: Primary | ICD-10-CM

## 2023-12-14 DIAGNOSIS — R26.89 IMBALANCE: ICD-10-CM

## 2023-12-14 DIAGNOSIS — U09.9 LONG COVID: ICD-10-CM

## 2023-12-14 DIAGNOSIS — H53.8 BLURRED VISION: ICD-10-CM

## 2023-12-14 DIAGNOSIS — U09.9 POST-COVID CHRONIC CONCENTRATION DEFICIT: ICD-10-CM

## 2023-12-14 DIAGNOSIS — U09.9 POST-COVID CHRONIC DECREASED MOBILITY AND ENDURANCE: ICD-10-CM

## 2023-12-14 DIAGNOSIS — G93.32 POST-COVID CHRONIC FATIGUE: ICD-10-CM

## 2023-12-14 DIAGNOSIS — G93.32 POST-COVID CHRONIC FATIGUE: Primary | ICD-10-CM

## 2023-12-14 DIAGNOSIS — U09.9 POST-COVID CHRONIC FATIGUE: ICD-10-CM

## 2023-12-14 LAB
FERRITIN SERPL-MCNC: 51 NG/ML (ref 6–175)
IRON SERPL-MCNC: 113 UG/DL (ref 37–145)

## 2023-12-14 PROCEDURE — 86800 THYROGLOBULIN ANTIBODY: CPT

## 2023-12-14 PROCEDURE — 84432 ASSAY OF THYROGLOBULIN: CPT | Mod: 90

## 2023-12-14 PROCEDURE — 86376 MICROSOMAL ANTIBODY EACH: CPT

## 2023-12-14 PROCEDURE — 99000 SPECIMEN HANDLING OFFICE-LAB: CPT

## 2023-12-14 PROCEDURE — 84207 ASSAY OF VITAMIN B-6: CPT | Mod: 90

## 2023-12-14 PROCEDURE — 82728 ASSAY OF FERRITIN: CPT

## 2023-12-14 PROCEDURE — 36415 COLL VENOUS BLD VENIPUNCTURE: CPT

## 2023-12-14 PROCEDURE — 83540 ASSAY OF IRON: CPT

## 2023-12-14 PROCEDURE — 99205 OFFICE O/P NEW HI 60 MIN: CPT | Mod: VID | Performed by: PHYSICIAN ASSISTANT

## 2023-12-14 NOTE — NURSING NOTE
Is the patient currently in the state of [place]? YES    Visit mode:VIDEO    If the visit is dropped, the patient can be reconnected by: TELEPHONE VISIT: Phone number:   Telephone Information:   Mobile [phone]       Will anyone else be joining the visit? NO  (If patient encounters technical issues they should call [phone])    How would you like to obtain your AVS? MyChart    Are changes needed to the allergy or medication list? Pt stated no med changes    Reason for visit: Video Visit (Balance/mobility concerns - fatigue headache weakness neuro symptoms )    Alyson LOCKEF

## 2023-12-14 NOTE — LETTER
12/14/2023       RE: Corrina Beasley  [address] [facility] [address]       Dear Colleague,    Thank you for referring your patient, Corrina Beasley, to the [facility] PHYSICAL MEDICINE AND REHABILITATION CLIN[facility] [place] at [facility]. Please see a copy of my visit note below.    Corrina Beasley is a 33 year old female who presents to be evaluated for a billable video visit.      Assessment/ Impression:   1. Post-COVID chronic fatigue/Post-COVID chronic concentration deficit/Post-COVID chronic decreased mobility and endurance  Patient with significant fatigue and concentration difficulties.  Patient also with postexertional malaise.Discussed energy conservation and provided information on fatigue management.  Also discussed referral to Occupational therapy for the COVID-19 program.  As patient has symptoms that are similar to concussion discussed safe and sound protocol.  Also discussed checking for underlying reasons for fatigue/balance issues.  Will check iron, ferritin, B6, and thyroid antibodies  - Adult Post Covid Clinic  Referral  - Iron; Future  - Ferritin; Future  - Vitamin B6; Future  - Occupational Therapy Referral; Future  - Thyroid peroxidase antibody; Future  - Thyroglobulin and Antibody Reflex; Future    2. Imbalance  Patient with issues with her balance and feeling as if she is going to fall backwards or to the side when standing.  Reviewed [facility] notes from ENT audiology and neurology.  MRI and EMG were normal.  [facility] not [facility] discussed having patient see them to further evaluate her imbalance.  Discussed also checking B6 as she does have mild paresthesias.  - Adult Post Covid Clinic  Referral  - Vitamin B6; Future  - Internal Medicine Referral; Future    3. Blurred vision  Patient with intermittent blurred vision most consistent when she is trying to concentrate as well as drive  at night.  Discussed having patient see neuro-ophthalmology as well as start the safe and sound protocol.  Concerned patient might have convergence insufficiency.  - Internal Medicine Referral; Future  - Occupational Therapy Referral; Future  - Adult Eye  Referral; Future    4. Acquired hypothyroidism  Patient does have history of hypothyroidism most recent levels were stable.  Patient has not had antibodies tested and discussed due to her ongoing fatigue will check for Hashimoto's.  If antibody levels are positive we will likely refer to endocrinology.  - Thyroid peroxidase antibody; Future  - Thyroglobulin and Antibody Reflex; Future    5. Long COVID  Discussed COVID and Post COVID with patient.  Educational materials provided and all questions answered.      Plan:  I reviewed present knowledge on long-Covid.  Education was provided and question were answered.  Orders/Referrals as above  Will advised patient on test results  I will follow up with Corrina Beasley in 3 months. I will review progress and consider need for any other therapeutic interventions. If there are any questions and/or concerns she will call the clinic.      On day of encounter time spent in chart review and with patient in consultation, exam, education, coordination of care, review of outside charts/data and documentation:  60 minutes     I have attempted to proof read for major spelling errors and apologize for any minor errors I may have missed.      This note was dictated using voice recognition software. Any grammatical or context distortions are unintentional and inherent to the software.    _____________  KELL [facility] PHYSICAL MEDICINE AND REHABILITATION CLINIC [place]    Subjective   This 33 year old female presents to the [facility] Post-COVID clinic as a new consult to evaluate continuing symptoms after COVID infection initially diagnosed 6/2022.  Corrina FRANK  Ramos [place] in [place] and [place] hiking in [place]. She did get PCR there while abroad.  She was complaining of cough, headache, diarrhea, nausea, fatigue, and weakness. Treatment was symptomatic/OTC.  Corrina Beasley experienced complications of chronic fatigue.  Continuing symptoms include  imbalance, vision difficulties, fatigue, numbness, brain fog, and distractibility.   Patient has fatigue after seeing 4-5 patients.     Patient has notices trouble focusing as well.  Patient is able to complete her charts but notices it takes her longer to complete.  Patient does sometime have trouble falling asleep. Patients has notice she has more difficulties with balance and will sway backwards or side to side.   she feels more backwards. Patient has worked with both ENT and neurology through [facility]  Notes reviewed.  She has noticed her vision is blurred at times or foggy.   Patient has noticed her vision is worse when she is trying to focus or when driving at night.  Patient has seen ophthalmology and was told her vision was normal but has not seen neurology neurology.  Past medical history is significant for  Anemia, Hypothyroidism . The patient was vaccinated against COVID x5, last vaccination 10/20/23 .  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     History of COVID-19 infection: 6/2022  Date of first symptoms: 6/2022  Diagnosis: PCR- Obtained in [place]  Hospitalization: No  Treatment: Symptomatic  Current Symptoms: See subjective  Goals of Care: increase energy, improve thinking, and improve quality of life            12/11/2023    10:11 AM   PHQ Assesment Total Score(s)   PHQ-9 Score 8           12/11/2023    10:12 AM   MARTHA-7 Results   MARTHA 7 TOTAL SCORE 5 (mild anxiety)   MARTHA-7 Total Score 5         12/11/2023    10:13 AM   PTSD Screen Score   Have you ever experienced this kind of event? Yes   PTSD Screen (Score of 3 or more suggests positive screen) 0         12/11/2023     10:40 AM   PROMIS-29   PROMIS Physical Function T-Score 34 (moderate dysfunction)   PROMIS Anxiety T-Score 51 (within normal limits)   PROMIS Depression T-Score 57 (mild)   PROMIS Fatigue T-Score 69 (moderate)   PROMIS Sleep Disturbance T-Score 52 (within normal limits)   PROMIS Ability to Participate in Social Roles & Activities T-Score 29 (severe dysfunction)   PROMIS Pain Interference T-Score 42 (within normal limits)   PROMIS Pain Intensity 3       No past medical history on file.    Past Surgical History:   Procedure Laterality Date    AS MYOMECTOMY 5/>,TOT>250 GMS,ABD APPRCH  02/11/2016    HC TOOTH EXTRACTION W/FORCEP  05/2009       Family History   Problem Relation Age of Onset    Hypertension Father     Alcoholism Father     Hepatitis Father         C    Cancer Maternal Grandfather     Hypothyroidism Paternal Grandmother     Cancer Paternal Grandfather     Hypothyroidism Paternal Aunt        Social History     Tobacco Use    Smoking status: Never    Smokeless tobacco: Never   Substance Use Topics    Alcohol use: Yes     Comment: 2 drinks or less per week    Drug use: Never         Current Outpatient Medications:     CALCIUM-VITAMIN D PO, Take by mouth daily , Disp: , Rfl:     cetirizine (ZYRTEC) 10 MG tablet, Take 10 mg by mouth daily, Disp: , Rfl:     levonorgestrel (MIRENA) 52 MG (20 mcg/day) IUD, 1 each by Intrauterine route, Disp: , Rfl:     melatonin 3 MG tablet, Take 2 mg by mouth nightly as needed for sleep, Disp: , Rfl:     Multiple Vitamin (MULTIVITAMIN PO), Take by mouth daily , Disp: , Rfl:     vitamin B-12 (CYANOCOBALAMIN) 1000 MCG tablet, Take 1 tablet by mouth daily, Disp: , Rfl:     Review of Systems   Constitutional: denies any fevers, chills, any recent weight loss , + fatigue  Eyes: denies changes in visual acuity , + blurred vision  Ears, Nose, Throat: denies any difficulty swallowing   Cardiovascular: denies any exertional chest pain or palpitation   Respiratory: denies dyspnea  "  Gastrointestinal: denies any nausea, vomiting, abdominal pain, diarrhea or constipation   Genitourinary: denies any dysuria, hematuria, frequency or urgency   Musculoskeletal: denies any muscle pain, joint pain, neck pain or back pain   Neurologic: denies any headache, + changes in motor or sensory function, + loss of balance or vertigo   Psychiatric: denies mood changes; + insomnia, + concentration difficulties        Objective    Vitals:  No vitals were obtained today due to virtual visit.    Physical Exam   GENERAL: Healthy, alert and no distress  EYES: Eyes grossly normal to inspection.  No discharge or erythema, or obvious scleral/conjunctival abnormalities.  RESP: No audible wheeze, cough, or visible cyanosis.  No visible retractions or increased work of breathing.    SKIN: Visible skin clear. No significant rash, abnormal pigmentation or lesions.  NEURO: Cranial nerves grossly intact.  Mentation and speech appropriate for age.  PSYCH: Mentation appears normal, affect normal/bright, judgement and insight intact, normal speech and appearance well-groomed.            No results found for: \"CRP\"   Erythrocyte Sedimentation Rate   Date Value Ref Range Status   11/27/2023 8 0 - 20 mm/hr Final      Last Renal Panel:  Sodium   Date Value Ref Range Status   07/10/2023 141 136 - 145 mmol/L Final   06/10/2021 140 133 - 144 mmol/L Final     Potassium   Date Value Ref Range Status   07/10/2023 4.2 3.4 - 5.3 mmol/L Final   06/10/2021 3.6 3.4 - 5.3 mmol/L Final     Chloride   Date Value Ref Range Status   07/10/2023 102 98 - 107 mmol/L Final   06/10/2021 108 94 - 109 mmol/L Final     Carbon Dioxide   Date Value Ref Range Status   06/10/2021 27 20 - 32 mmol/L Final     Carbon Dioxide (CO2)   Date Value Ref Range Status   07/10/2023 22 22 - 29 mmol/L Final     Anion Gap   Date Value Ref Range Status   07/10/2023 17 (H) 7 - 15 mmol/L Final   06/10/2021 5 3 - 14 mmol/L Final     Glucose   Date Value Ref Range Status " "  07/10/2023 61 (L) 70 - 99 mg/dL Final   06/10/2021 84 70 - 99 mg/dL Final     Urea Nitrogen   Date Value Ref Range Status   07/10/2023 12.4 6.0 - 20.0 mg/dL Final   06/10/2021 11 7 - 30 mg/dL Final     Creatinine   Date Value Ref Range Status   07/10/2023 0.78 0.51 - 0.95 mg/dL Final   06/10/2021 0.75 0.52 - 1.04 mg/dL Final     GFR Estimate   Date Value Ref Range Status   07/10/2023 >90 >60 mL/min/1.73m2 Final   06/10/2021 >90 >60 mL/min/[1.73_m2] Final     Comment:     Non  GFR Calc  Starting 12/18/2018, serum creatinine based estimated GFR (eGFR) will be   calculated using the Chronic Kidney Disease Epidemiology Collaboration   (CKD-EPI) equation.       Calcium   Date Value Ref Range Status   07/13/2023 9.9 8.6 - 10.0 mg/dL Final   06/10/2021 9.2 8.5 - 10.1 mg/dL Final     Albumin   Date Value Ref Range Status   07/10/2023 5.4 (H) 3.5 - 5.2 g/dL Final   06/10/2021 4.4 3.4 - 5.0 g/dL Final      Lab Results   Component Value Date    WBC 4.2 07/10/2023    WBC 5.2 06/10/2021     Lab Results   Component Value Date    RBC 4.67 07/10/2023    RBC 4.08 06/10/2021     Lab Results   Component Value Date    HGB 14.4 07/10/2023    HGB 12.6 06/10/2021     Lab Results   Component Value Date    HCT 42.9 07/10/2023    HCT 37.4 06/10/2021     No components found for: \"MCT\"  Lab Results   Component Value Date    MCV 92 07/10/2023    MCV 92 06/10/2021     Lab Results   Component Value Date    MCH 30.8 07/10/2023    MCH 30.9 06/10/2021     Lab Results   Component Value Date    MCHC 33.6 07/10/2023    MCHC 33.7 06/10/2021     Lab Results   Component Value Date    RDW 11.7 07/10/2023    RDW 11.9 06/10/2021     Lab Results   Component Value Date     07/10/2023     06/10/2021      No results found for: \"A1C\"   TSH   Date Value Ref Range Status   11/27/2023 0.88 0.30 - 4.20 uIU/mL Final   05/03/2022 0.96 0.40 - 4.00 mU/L Final   06/10/2021 6.40 (H) 0.40 - 4.00 mU/L Final      Lab Results   Component Value " Date    VITDT 42 07/10/2023    VITDT 31 09/09/2020      Recent Labs   Lab Test 06/10/21  1759   MAG 1.9     Last Comprehensive Metabolic Panel:  Sodium   Date Value Ref Range Status   07/10/2023 141 136 - 145 mmol/L Final   06/10/2021 140 133 - 144 mmol/L Final     Potassium   Date Value Ref Range Status   07/10/2023 4.2 3.4 - 5.3 mmol/L Final   06/10/2021 3.6 3.4 - 5.3 mmol/L Final     Chloride   Date Value Ref Range Status   07/10/2023 102 98 - 107 mmol/L Final   06/10/2021 108 94 - 109 mmol/L Final     Carbon Dioxide   Date Value Ref Range Status   06/10/2021 27 20 - 32 mmol/L Final     Carbon Dioxide (CO2)   Date Value Ref Range Status   07/10/2023 22 22 - 29 mmol/L Final     Anion Gap   Date Value Ref Range Status   07/10/2023 17 (H) 7 - 15 mmol/L Final   06/10/2021 5 3 - 14 mmol/L Final     Glucose   Date Value Ref Range Status   07/10/2023 61 (L) 70 - 99 mg/dL Final   06/10/2021 84 70 - 99 mg/dL Final     Urea Nitrogen   Date Value Ref Range Status   07/10/2023 12.4 6.0 - 20.0 mg/dL Final   06/10/2021 11 7 - 30 mg/dL Final     Creatinine   Date Value Ref Range Status   07/10/2023 0.78 0.51 - 0.95 mg/dL Final   06/10/2021 0.75 0.52 - 1.04 mg/dL Final     GFR Estimate   Date Value Ref Range Status   07/10/2023 >90 >60 mL/min/1.73m2 Final   06/10/2021 >90 >60 mL/min/[1.73_m2] Final     Comment:     Non  GFR Calc  Starting 12/18/2018, serum creatinine based estimated GFR (eGFR) will be   calculated using the Chronic Kidney Disease Epidemiology Collaboration   (CKD-EPI) equation.       Calcium   Date Value Ref Range Status   07/13/2023 9.9 8.6 - 10.0 mg/dL Final   06/10/2021 9.2 8.5 - 10.1 mg/dL Final     Bilirubin Total   Date Value Ref Range Status   07/10/2023 0.9 <=1.2 mg/dL Final   06/10/2021 0.5 0.2 - 1.3 mg/dL Final     Alkaline Phosphatase   Date Value Ref Range Status   07/10/2023 41 35 - 104 U/L Final   06/10/2021 52 40 - 150 U/L Final     ALT   Date Value Ref Range Status   07/10/2023 14  0 - 50 U/L Final     Comment:     Reference intervals for this test were updated on 6/12/2023 to more accurately reflect our healthy population. There may be differences in the flagging of prior results with similar values performed with this method. Interpretation of those prior results can be made in the context of the updated reference intervals.     06/10/2021 23 0 - 50 U/L Final     AST   Date Value Ref Range Status   07/10/2023 22 0 - 45 U/L Final     Comment:     Reference intervals for this test were updated on 6/12/2023 to more accurately reflect our healthy population. There may be differences in the flagging of prior results with similar values performed with this method. Interpretation of those prior results can be made in the context of the updated reference intervals.   06/10/2021 19 0 - 45 U/L Final     Most Recent D-dimer:No lab results found.        Imaging:    I personally reviewed the following imaging results today and those on care everywhere, if indicated     CT ear Wo IV contrast 11/27/23    [facility]  CT EAR WO IV CONT  11/22/2023 7:47 AM CST    INDICATION: Right-sided conductive hearing loss, eval for otosclerosis vs superior semicircular canal dehiscence. Imbalance and brain fog x1 year.  TECHNIQUE:  Routine CT of the temporal bones including thin section helical imaging with dedicated axial and coronal reconstructed images of each temporal bone. Dose reduction techniques were used.  CONTRAST: None.  COMPARISON: MRI brain 09/16/2023      FINDINGS:  RIGHT TEMPORAL BONE: The external auditory canal is widely patent. The tympanic membrane appears thin and intact. The ossicular chain is intact and the bony scutum remains sharp. There is no middle ear cavity or mastoid effusion. The tegmen and sigmoid plate are intact. No evidence of otosclerosis. Inner ear structures including the cochlea, modiolus, semicircular canals, vestibule, and vestibular aqueduct are unremarkable. No superior  semicircular canal dehiscence. The bony carotid canal is intact. The facial nerve canal is of normal course and caliber. The internal auditory canal is of normal size.    LEFT TEMPORAL BONE: The external auditory canal is widely patent. The tympanic membrane appears thin and intact. The ossicular chain is intact and the bony scutum remains sharp. There is no middle ear cavity or mastoid effusion. The tegmen and sigmoid plate are intact. No evidence of otosclerosis. Inner ear structures including the cochlea, modiolus, semicircular canals, vestibule, and vestibular aqueduct are unremarkable. The bony carotid canal is intact. The facial nerve canal is of normal course and caliber. The internal auditory canal is of normal size.    OTHER: Mild mucosal thickening in the visualized inferior right maxillary sinus and ethmoid air cells bilaterally. The visualized intracranial compartment and orbits appear grossly unremarkable. The temporomandibular joints also appear unremarkable.      IMPRESSION:  1.  Unremarkable CT of the right temporal bone, without evidence for otosclerosis or dehiscence of the superior semicircular canal.  2.  Unremarkable CT of the left temporal bone, also without evidence for otosclerosis or dehiscence of the superior semicircular canal.  Exam End: 11/22/23  7:47 AM      MRI Brain WO/W contast 9/16/23  EXAM: MR BRAIN W/WO IV CONT    DATE: 9/16/2023    INDICATION: Imbalance, nrain fog x1 year, Other abnormalities of gait and mobility, Other symptoms and signs involving cogni  COMPARISON: None.  CONTRAST: GADOBUTROL 1 MMOL/ML IV SOLN 6.5 mL  TECHNIQUE: Routine multiplanar multisequence head MRI without and with intravenous contrast.    FINDINGS:  INTRACRANIAL CONTENTS: No acute or subacute infarct. No mass, acute hemorrhage, or extra-axial fluid collections. Normal brain parenchymal signal. Normal ventricles and sulci. Normal position of the cerebellar tonsils. No pathologic contrast  enhancement.    SELLA: No abnormality accounting for technique.    OSSEOUS STRUCTURES/SOFT TISSUES: Normal marrow signal. The major intracranial vascular flow voids are maintained.    ORBITS: No abnormality accounting for technique.    SINUSES/MASTOIDS: No paranasal sinus mucosal disease. No middle ear or mastoid effusion.    IMPRESSION:  1.  Normal head MRI.  Exam End: 09/16/23 10:21 AM     EMG 11/28/23  Summary    The motor conduction test was normal in all 4 of the tested nerves: R Median - APB, R Ulnar - ADM, R Peroneal - EDB, R Tibial - AH.    The sensory conduction test was normal in all 4 of the tested nerves: R Superficial radial - Anatomical snuff box (Forearm), R Sural - Ankle (Calf), R Median - Stim Palm - Rec Wrist Elbow (2ch), R Ulnar - Stim Palm - Rec Wrist Elbow (2-Ch).    The F wave study was normal in all 4 of the tested nerves: R Peroneal - EDB, R Tibial - AH, R Median - APB, R Ulnar - ADM.    The needle EMG study was normal in all 10 tested muscles: R. Biceps brachii, R. Triceps brachii, R. Pronator teres, R. First dorsal interosseous, R. Abductor pollicis brevis, R. Tibialis anterior, R. Gastrocnemius (Medial head), R. Vastus lateralis, R. Biceps femoris (short head), R. Peroneus longus.      Conclusion:     1. Normal study of right upper and lower extremity.    2. There is no electro diagnostic evidence at this time of a right upper or lower extremity motor radiculopathy, plexopathy, peripheral neuropathy, or isolated mono neuropathy at this time .    3. Of note EMG would not rule out small fiber neuropathy. Clinical correlation recommened.    Reed Jimenes M.D.   Reviewed imaging from [facility] sites and [facility]     Medical Records Reviewed:    Reviewed consults/documents from [facility] and [facility] including Neurology, ENT, and PT           Again, thank you for allowing me to participate in the care of your patient.      Sincerely,    Jennifer YIP  KELL Rodriguez

## 2023-12-14 NOTE — PROGRESS NOTES
Corrina Beasley is a 33 year old female who presents to be evaluated for a billable video visit.    Video-Visit Details    Video visit Start time: 7:32 am    Type of service:  Video Visit    Video End Time: 7:16 am    Originating Location (pt. Location): Home    Distant Location (provider location):  Off- Site    Platform used for Video Visit: [employer]    Assessment/ Impression:   1. Post-COVID chronic fatigue/Post-COVID chronic concentration deficit/Post-COVID chronic decreased mobility and endurance  Patient with significant fatigue and concentration difficulties.  Patient also with postexertional malaise.Discussed energy conservation and provided information on fatigue management.  Also discussed referral to Occupational therapy for the COVID-19 program.  As patient has symptoms that are similar to concussion discussed safe and sound protocol.  Also discussed checking for underlying reasons for fatigue/balance issues.  Will check iron, ferritin, B6, and thyroid antibodies  - Adult Post Covid Clinic  Referral  - Iron; Future  - Ferritin; Future  - Vitamin B6; Future  - Occupational Therapy Referral; Future  - Thyroid peroxidase antibody; Future  - Thyroglobulin and Antibody Reflex; Future    2. Imbalance  Patient with issues with her balance and feeling as if she is going to fall backwards or to the side when standing.  Reviewed [facility] notes from ENT audiology and neurology.  MRI and EMG were normal.  Patient has not seen [facility] discussed having patient see them to further evaluate her imbalance.  Discussed also checking B6 as she does have mild paresthesias.  - Adult Post Covid Clinic  Referral  - Vitamin B6; Future  - Internal Medicine Referral; Future    3. Blurred vision  Patient with intermittent blurred vision most consistent when she is trying to concentrate as well as drive at night.  Discussed having patient see neuro-ophthalmology as well as start the  safe and sound protocol.  Concerned patient might have convergence insufficiency.  - Internal Medicine Referral; Future  - Occupational Therapy Referral; Future  - Adult Eye  Referral; Future    4. Acquired hypothyroidism  Patient does have history of hypothyroidism most recent levels were stable.  Patient has not had antibodies tested and discussed due to her ongoing fatigue will check for Hashimoto's.  If antibody levels are positive we will likely refer to endocrinology.  - Thyroid peroxidase antibody; Future  - Thyroglobulin and Antibody Reflex; Future    5. Long COVID  Discussed COVID and Post COVID with patient.  Educational materials provided and all questions answered.      Plan:  I reviewed present knowledge on long-Covid.  Education was provided and question were answered.  Orders/Referrals as above  Will advised patient on test results  I will follow up with Corrina Beasley in 3 months. I will review progress and consider need for any other therapeutic interventions. If there are any questions and/or concerns she will call the clinic.      On day of encounter time spent in chart review and with patient in consultation, exam, education, coordination of care, review of outside charts/data and documentation:  60 minutes     I have attempted to proof read for major spelling errors and apologize for any minor errors I may have missed.      This note was dictated using voice recognition software. Any grammatical or context distortions are unintentional and inherent to the software.    _____________  Jennifer Rodriguez PA-C  [facility] PHYSICAL MEDICINE AND REHABILITATION CLINIC [facility]   This 33 year old female presents to the [facility] Medicine Post-COVID clinic as a new consult to evaluate continuing symptoms after COVID infection initially diagnosed 6/2022.  Corrina Beasley was in [place] and contracted COVID while hiking in [place]. She did get PCR  there while abroad.  She was complaining of cough, headache, diarrhea, nausea, fatigue, and weakness. Treatment was symptomatic/OTC.  Corrina Beasley experienced complications of chronic fatigue.  Continuing symptoms include  imbalance, vision difficulties, fatigue, numbness, brain fog, and distractibility.   Patient has fatigue after seeing 4-5 patients.     Patient has notices trouble focusing as well.  Patient is able to complete her charts but notices it takes her longer to complete.  Patient does sometime have trouble falling asleep. Patients has notice she has more difficulties with balance and will sway backwards or side to side.   she feels more backwards. Patient has worked with both ENT and neurology through [facility].  Notes reviewed.  She has noticed her vision is blurred at times or foggy.   Patient has noticed her vision is worse when she is trying to focus or when driving at night.  Patient has seen ophthalmology and was told her vision was normal but has not seen neurology neurology.  Past medical history is significant for  Anemia, Hypothyroidism . The patient was vaccinated against COVID x5, last vaccination 10/20/23 .  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     History of COVID-19 infection: 6/2022  Date of first symptoms: 6/2022  Diagnosis: PCR- Obtained in \[facility]\""  Hospitalization: No  Treatment: Symptomatic  Current Symptoms: See subjective  Goals of Care: increase energy, improve thinking, and improve quality of life            12/11/2023    10:11 AM   PHQ Assesment Total Score(s)   PHQ-9 Score 8           12/11/2023    10:12 AM   MARTHA-7 Results   MARTHA 7 TOTAL SCORE 5 (mild anxiety)   MARTHA-7 Total Score 5         12/11/2023    10:13 AM   PTSD Screen Score   Have you ever experienced this kind of event? Yes   PTSD Screen (Score of 3 or more suggests positive screen) 0         12/11/2023    10:40 AM   PROMIS-29   PROMIS Physical Function T-Score 34 (moderate dysfunction)    PROMIS Anxiety T-Score 51 (within normal limits)   PROMIS Depression T-Score 57 (mild)   PROMIS Fatigue T-Score 69 (moderate)   PROMIS Sleep Disturbance T-Score 52 (within normal limits)   PROMIS Ability to Participate in Social Roles & Activities T-Score 29 (severe dysfunction)   PROMIS Pain Interference T-Score 42 (within normal limits)   PROMIS Pain Intensity 3       No past medical history on file.    Past Surgical History:   Procedure Laterality Date    AS MYOMECTOMY 5/>,TOT>250 GMS,ABD APPRCH  02/11/2016    HC TOOTH EXTRACTION W/FORCEP  05/2009       Family History   Problem Relation Age of Onset    Hypertension Father     Alcoholism Father     Hepatitis Father         C    Cancer Maternal Grandfather     Hypothyroidism Paternal Grandmother     Cancer Paternal Grandfather     Hypothyroidism Paternal Aunt        Social History     Tobacco Use    Smoking status: Never    Smokeless tobacco: Never   Substance Use Topics    Alcohol use: Yes     Comment: 2 drinks or less per week    Drug use: Never         Current Outpatient Medications:     CALCIUM-VITAMIN D PO, Take by mouth daily , Disp: , Rfl:     cetirizine (ZYRTEC) 10 MG tablet, Take 10 mg by mouth daily, Disp: , Rfl:     levonorgestrel (MIRENA) 52 MG (20 mcg/day) IUD, 1 each by Intrauterine route, Disp: , Rfl:     melatonin 3 MG tablet, Take 2 mg by mouth nightly as needed for sleep, Disp: , Rfl:     Multiple Vitamin (MULTIVITAMIN PO), Take by mouth daily , Disp: , Rfl:     vitamin B-12 (CYANOCOBALAMIN) 1000 MCG tablet, Take 1 tablet by mouth daily, Disp: , Rfl:     Review of Systems   Constitutional: denies any fevers, chills, any recent weight loss , + fatigue  Eyes: denies changes in visual acuity , + blurred vision  Ears, Nose, Throat: denies any difficulty swallowing   Cardiovascular: denies any exertional chest pain or palpitation   Respiratory: denies dyspnea   Gastrointestinal: denies any nausea, vomiting, abdominal pain, diarrhea or constipation  "  Genitourinary: denies any dysuria, hematuria, frequency or urgency   Musculoskeletal: denies any muscle pain, joint pain, neck pain or back pain   Neurologic: denies any headache, + changes in motor or sensory function, + loss of balance or vertigo   Psychiatric: denies mood changes; + insomnia, + concentration difficulties        Objective    Vitals:  No vitals were obtained today due to virtual visit.    Physical Exam   GENERAL: Healthy, alert and no distress  EYES: Eyes grossly normal to inspection.  No discharge or erythema, or obvious scleral/conjunctival abnormalities.  RESP: No audible wheeze, cough, or visible cyanosis.  No visible retractions or increased work of breathing.    SKIN: Visible skin clear. No significant rash, abnormal pigmentation or lesions.  NEURO: Cranial nerves grossly intact.  Mentation and speech appropriate for age.  PSYCH: Mentation appears normal, affect normal/bright, judgement and insight intact, normal speech and appearance well-groomed.            No results found for: \"CRP\"   Erythrocyte Sedimentation Rate   Date Value Ref Range Status   11/27/2023 8 0 - 20 mm/hr Final      Last Renal Panel:  Sodium   Date Value Ref Range Status   07/10/2023 141 136 - 145 mmol/L Final   06/10/2021 140 133 - 144 mmol/L Final     Potassium   Date Value Ref Range Status   07/10/2023 4.2 3.4 - 5.3 mmol/L Final   06/10/2021 3.6 3.4 - 5.3 mmol/L Final     Chloride   Date Value Ref Range Status   07/10/2023 102 98 - 107 mmol/L Final   06/10/2021 108 94 - 109 mmol/L Final     Carbon Dioxide   Date Value Ref Range Status   06/10/2021 27 20 - 32 mmol/L Final     Carbon Dioxide (CO2)   Date Value Ref Range Status   07/10/2023 22 22 - 29 mmol/L Final     Anion Gap   Date Value Ref Range Status   07/10/2023 17 (H) 7 - 15 mmol/L Final   06/10/2021 5 3 - 14 mmol/L Final     Glucose   Date Value Ref Range Status   07/10/2023 61 (L) 70 - 99 mg/dL Final   06/10/2021 84 70 - 99 mg/dL Final     Urea Nitrogen " "  Date Value Ref Range Status   07/10/2023 12.4 6.0 - 20.0 mg/dL Final   06/10/2021 11 7 - 30 mg/dL Final     Creatinine   Date Value Ref Range Status   07/10/2023 0.78 0.51 - 0.95 mg/dL Final   06/10/2021 0.75 0.52 - 1.04 mg/dL Final     GFR Estimate   Date Value Ref Range Status   07/10/2023 >90 >60 mL/min/1.73m2 Final   06/10/2021 >90 >60 mL/min/[1.73_m2] Final     Comment:     Non  GFR Calc  Starting 12/18/2018, serum creatinine based estimated GFR (eGFR) will be   calculated using the Chronic Kidney Disease Epidemiology Collaboration   (CKD-EPI) equation.       Calcium   Date Value Ref Range Status   07/13/2023 9.9 8.6 - 10.0 mg/dL Final   06/10/2021 9.2 8.5 - 10.1 mg/dL Final     Albumin   Date Value Ref Range Status   07/10/2023 5.4 (H) 3.5 - 5.2 g/dL Final   06/10/2021 4.4 3.4 - 5.0 g/dL Final      Lab Results   Component Value Date    WBC 4.2 07/10/2023    WBC 5.2 06/10/2021     Lab Results   Component Value Date    RBC 4.67 07/10/2023    RBC 4.08 06/10/2021     Lab Results   Component Value Date    HGB 14.4 07/10/2023    HGB 12.6 06/10/2021     Lab Results   Component Value Date    HCT 42.9 07/10/2023    HCT 37.4 06/10/2021     No components found for: \"MCT\"  Lab Results   Component Value Date    MCV 92 07/10/2023    MCV 92 06/10/2021     Lab Results   Component Value Date    MCH 30.8 07/10/2023    MCH 30.9 06/10/2021     Lab Results   Component Value Date    MCHC 33.6 07/10/2023    MCHC 33.7 06/10/2021     Lab Results   Component Value Date    RDW 11.7 07/10/2023    RDW 11.9 06/10/2021     Lab Results   Component Value Date     07/10/2023     06/10/2021      No results found for: \"A1C\"   TSH   Date Value Ref Range Status   11/27/2023 0.88 0.30 - 4.20 uIU/mL Final   05/03/2022 0.96 0.40 - 4.00 mU/L Final   06/10/2021 6.40 (H) 0.40 - 4.00 mU/L Final      Lab Results   Component Value Date    VITDT 42 07/10/2023    VITDT 31 09/09/2020      Recent Labs   Lab Test 06/10/21  1759 "   MAG 1.9     Last Comprehensive Metabolic Panel:  Sodium   Date Value Ref Range Status   07/10/2023 141 136 - 145 mmol/L Final   06/10/2021 140 133 - 144 mmol/L Final     Potassium   Date Value Ref Range Status   07/10/2023 4.2 3.4 - 5.3 mmol/L Final   06/10/2021 3.6 3.4 - 5.3 mmol/L Final     Chloride   Date Value Ref Range Status   07/10/2023 102 98 - 107 mmol/L Final   06/10/2021 108 94 - 109 mmol/L Final     Carbon Dioxide   Date Value Ref Range Status   06/10/2021 27 20 - 32 mmol/L Final     Carbon Dioxide (CO2)   Date Value Ref Range Status   07/10/2023 22 22 - 29 mmol/L Final     Anion Gap   Date Value Ref Range Status   07/10/2023 17 (H) 7 - 15 mmol/L Final   06/10/2021 5 3 - 14 mmol/L Final     Glucose   Date Value Ref Range Status   07/10/2023 61 (L) 70 - 99 mg/dL Final   06/10/2021 84 70 - 99 mg/dL Final     Urea Nitrogen   Date Value Ref Range Status   07/10/2023 12.4 6.0 - 20.0 mg/dL Final   06/10/2021 11 7 - 30 mg/dL Final     Creatinine   Date Value Ref Range Status   07/10/2023 0.78 0.51 - 0.95 mg/dL Final   06/10/2021 0.75 0.52 - 1.04 mg/dL Final     GFR Estimate   Date Value Ref Range Status   07/10/2023 >90 >60 mL/min/1.73m2 Final   06/10/2021 >90 >60 mL/min/[1.73_m2] Final     Comment:     Non  GFR Calc  Starting 12/18/2018, serum creatinine based estimated GFR (eGFR) will be   calculated using the Chronic Kidney Disease Epidemiology Collaboration   (CKD-EPI) equation.       Calcium   Date Value Ref Range Status   07/13/2023 9.9 8.6 - 10.0 mg/dL Final   06/10/2021 9.2 8.5 - 10.1 mg/dL Final     Bilirubin Total   Date Value Ref Range Status   07/10/2023 0.9 <=1.2 mg/dL Final   06/10/2021 0.5 0.2 - 1.3 mg/dL Final     Alkaline Phosphatase   Date Value Ref Range Status   07/10/2023 41 35 - 104 U/L Final   06/10/2021 52 40 - 150 U/L Final     ALT   Date Value Ref Range Status   07/10/2023 14 0 - 50 U/L Final     Comment:     Reference intervals for this test were updated on 6/12/2023  to more accurately reflect our healthy population. There may be differences in the flagging of prior results with similar values performed with this method. Interpretation of those prior results can be made in the context of the updated reference intervals.     06/10/2021 23 0 - 50 U/L Final     AST   Date Value Ref Range Status   07/10/2023 22 0 - 45 U/L Final     Comment:     Reference intervals for this test were updated on 6/12/2023 to more accurately reflect our healthy population. There may be differences in the flagging of prior results with similar values performed with this method. Interpretation of those prior results can be made in the context of the updated reference intervals.   06/10/2021 19 0 - 45 U/L Final     Most Recent D-dimer:No lab results found.        Imaging:    I personally reviewed the following imaging results today and those on care everywhere, if indicated     CT ear Wo IV contrast 11/27/23     [facility]  CT EAR WO IV CONT  11/22/2023 7:47 AM CST    INDICATION: Right-sided conductive hearing loss, eval for otosclerosis vs superior semicircular canal dehiscence. Imbalance and brain fog x1 year.  TECHNIQUE:  Routine CT of the temporal bones including thin section helical imaging with dedicated axial and coronal reconstructed images of each temporal bone. Dose reduction techniques were used.  CONTRAST: None.  COMPARISON: MRI brain 09/16/2023      FINDINGS:  RIGHT TEMPORAL BONE: The external auditory canal is widely patent. The tympanic membrane appears thin and intact. The ossicular chain is intact and the bony scutum remains sharp. There is no middle ear cavity or mastoid effusion. The tegmen and sigmoid plate are intact. No evidence of otosclerosis. Inner ear structures including the cochlea, modiolus, semicircular canals, vestibule, and vestibular aqueduct are unremarkable. No superior semicircular canal dehiscence. The bony carotid canal is intact. The facial nerve canal is of  normal course and caliber. The internal auditory canal is of normal size.    LEFT TEMPORAL BONE: The external auditory canal is widely patent. The tympanic membrane appears thin and intact. The ossicular chain is intact and the bony scutum remains sharp. There is no middle ear cavity or mastoid effusion. The tegmen and sigmoid plate are intact. No evidence of otosclerosis. Inner ear structures including the cochlea, modiolus, semicircular canals, vestibule, and vestibular aqueduct are unremarkable. The bony carotid canal is intact. The facial nerve canal is of normal course and caliber. The internal auditory canal is of normal size.    OTHER: Mild mucosal thickening in the visualized inferior right maxillary sinus and ethmoid air cells bilaterally. The visualized intracranial compartment and orbits appear grossly unremarkable. The temporomandibular joints also appear unremarkable.      IMPRESSION:  1.  Unremarkable CT of the right temporal bone, without evidence for otosclerosis or dehiscence of the superior semicircular canal.  2.  Unremarkable CT of the left temporal bone, also without evidence for otosclerosis or dehiscence of the superior semicircular canal.  Exam End: 11/22/23  7:47 AM      MRI Brain WO/W contast 9/16/23  EXAM: MR BRAIN W/WO IV CONT    DATE: 9/16/2023    INDICATION: Imbalance, nrain fog x1 year, Other abnormalities of gait and mobility, Other symptoms and signs involving cogni  COMPARISON: None.  CONTRAST: GADOBUTROL 1 MMOL/ML IV SOLN 6.5 mL  TECHNIQUE: Routine multiplanar multisequence head MRI without and with intravenous contrast.    FINDINGS:  INTRACRANIAL CONTENTS: No acute or subacute infarct. No mass, acute hemorrhage, or extra-axial fluid collections. Normal brain parenchymal signal. Normal ventricles and sulci. Normal position of the cerebellar tonsils. No pathologic contrast enhancement.    SELLA: No abnormality accounting for technique.    OSSEOUS STRUCTURES/SOFT TISSUES: Normal  marrow signal. The major intracranial vascular flow voids are maintained.    ORBITS: No abnormality accounting for technique.    SINUSES/MASTOIDS: No paranasal sinus mucosal disease. No middle ear or mastoid effusion.    IMPRESSION:  1.  Normal head MRI.  Exam End: 09/16/23 10:21 AM     EMG 11/28/23  Summary    The motor conduction test was normal in all 4 of the tested nerves: R Median - APB, R Ulnar - ADM, R Peroneal - EDB, R Tibial - AH.    The sensory conduction test was normal in all 4 of the tested nerves: R Superficial radial - Anatomical snuff box (Forearm), R Sural - Ankle (Calf), R Median - Stim Palm - Rec Wrist Elbow (2ch), R Ulnar - Stim Palm - Rec Wrist Elbow (2-Ch).    The F wave study was normal in all 4 of the tested nerves: R Peroneal - EDB, R Tibial - AH, R Median - APB, R Ulnar - ADM.    The needle EMG study was normal in all 10 tested muscles: R. Biceps brachii, R. Triceps brachii, R. Pronator teres, R. First dorsal interosseous, R. Abductor pollicis brevis, R. Tibialis anterior, R. Gastrocnemius (Medial head), R. Vastus lateralis, R. Biceps femoris (short head), R. Peroneus longus.      Conclusion:     1. Normal study of right upper and lower extremity.    2. There is no electro diagnostic evidence at this time of a right upper or lower extremity motor radiculopathy, plexopathy, peripheral neuropathy, or isolated mono neuropathy at this time .    3. Of note EMG would not rule out small fiber neuropathy. Clinical correlation recommened.    Reed Jimenes M.D.   Reviewed imaging from [facility] sites and [facility]     Medical Records Reviewed:    Reviewed consults/documents from [facility] and [facility] including Neurology, ENT, and PT

## 2023-12-14 NOTE — PATIENT INSTRUCTIONS
N- Acetylcysteine 600mg    Dr. Iron Hummel for Neuro-ophthalmology'    TO DO:   OT - safe and sound   Labs  Neuro-ophthalmology  [facility]  Follow-up 3 months    Post COVID Self Care Suggestions:     Fatigue Management:       https://www.archives-pmr.org/action/showPdf?ngs=-[email or web address]%2819%5232831-8       Self Care:      [email or web address]  Recovery World Health Organization:    https://apps.who.int/iris/bitstream/handle/26566/038614/ZVF-XKXU-0058-114-34085-42242-eng.pdf  Breathing exercises:    [email or web address]

## 2023-12-15 LAB
THYROGLOB AB SERPL IA-ACNC: 98 IU/ML
THYROPEROXIDASE AB SERPL-ACNC: 257 IU/ML

## 2023-12-17 LAB — PYRIDOXAL PHOS SERPL-SCNC: 114.5 NMOL/L

## 2023-12-19 LAB — THYROGLOB SERPL-MCNC: 0.6 NG/ML

## 2024-01-04 DIAGNOSIS — E03.9 ACQUIRED HYPOTHYROIDISM: Primary | ICD-10-CM

## 2024-01-05 ENCOUNTER — LAB (OUTPATIENT)
Dept: LAB | Facility: CLINIC | Age: 34
End: 2024-01-05
Payer: COMMERCIAL

## 2024-01-05 DIAGNOSIS — E03.9 ACQUIRED HYPOTHYROIDISM: ICD-10-CM

## 2024-01-05 LAB
T3 SERPL-MCNC: 77 NG/DL (ref 85–202)
T4 FREE SERPL-MCNC: 1.53 NG/DL (ref 0.9–1.7)
TSH SERPL DL<=0.005 MIU/L-ACNC: 3.39 UIU/ML (ref 0.3–4.2)

## 2024-01-05 PROCEDURE — 36415 COLL VENOUS BLD VENIPUNCTURE: CPT

## 2024-01-05 PROCEDURE — 84439 ASSAY OF FREE THYROXINE: CPT

## 2024-01-05 PROCEDURE — 84443 ASSAY THYROID STIM HORMONE: CPT

## 2024-01-05 PROCEDURE — 84480 ASSAY TRIIODOTHYRONINE (T3): CPT

## 2024-01-09 ENCOUNTER — THERAPY VISIT (OUTPATIENT)
Dept: OCCUPATIONAL THERAPY | Facility: CLINIC | Age: 34
End: 2024-01-09
Attending: PHYSICIAN ASSISTANT
Payer: COMMERCIAL

## 2024-01-09 DIAGNOSIS — U09.9 POST-COVID CHRONIC DECREASED MOBILITY AND ENDURANCE: ICD-10-CM

## 2024-01-09 DIAGNOSIS — U09.9 POST-COVID CHRONIC FATIGUE: ICD-10-CM

## 2024-01-09 DIAGNOSIS — U09.9 POST-COVID CHRONIC CONCENTRATION DEFICIT: ICD-10-CM

## 2024-01-09 DIAGNOSIS — Z74.09 POST-COVID CHRONIC DECREASED MOBILITY AND ENDURANCE: ICD-10-CM

## 2024-01-09 DIAGNOSIS — G93.32 POST-COVID CHRONIC FATIGUE: ICD-10-CM

## 2024-01-09 DIAGNOSIS — R41.840 POST-COVID CHRONIC CONCENTRATION DEFICIT: ICD-10-CM

## 2024-01-09 DIAGNOSIS — H53.8 BLURRED VISION: ICD-10-CM

## 2024-01-09 PROCEDURE — 97165 OT EVAL LOW COMPLEX 30 MIN: CPT | Mod: GO | Performed by: OCCUPATIONAL THERAPIST

## 2024-01-09 PROCEDURE — 97535 SELF CARE MNGMENT TRAINING: CPT | Mod: GO | Performed by: OCCUPATIONAL THERAPIST

## 2024-01-09 NOTE — PROGRESS NOTES
OCCUPATIONAL THERAPY EVALUATION  Type of Visit: Evaluation    See electronic medical record for Abuse and Falls Screening details.    Subjective  Patient is a 33yr old female with Post-COVID chronic fatigue [G93.32, U09.9], Blurred vision [H53.8], Post-COVID chronic concentration deficit [R41.840, U09.9], Post-COVID chronic decreased mobility and endurance [Z74.09, U09.9] with referral to outpatient occupational therapy for fatigue management and autonomic nervous system regulation to improve ease, efficiency, and participation with ADLs/IADLs.     Presenting condition or subjective complaint: Long covid  Date of onset: 01/04/24    Prior diagnostic imaging/testing results: MRI; EMG     Prior therapy history for the same diagnosis, illness or injury: Yes PT started december 2023    Prior Level of Function  Transfers: Independent  Ambulation: Independent  ADL: Independent  IADL: Driving, Finances, Housekeeping, Laundry, Meal preparation, Medication management, Work    Living Environment  Social support: Alone   Type of home: House   Stairs to enter the home: Yes 4 Is there a railing: Yes   Ramp: No   Stairs inside the home: Yes 10 Is there a railing: Yes   Help at home: None  Equipment owned:       Employment: Yes Speech therapist  Hobbies/Interests: Reading, biking, hiking, camping    Patient goals for therapy: Exercise, concentrate, do household chores with less fatigue    Pain assessment: Pain denied     Objective     Cognitive Status Examination  Orientation: Oriented to person, place and time   Level of Consciousness: Alert  Follows Commands and Answers Questions: 100% of the time  Personal Safety and Judgement: Intact  Memory: Intact  Attention: Sustained attention impaired, Difficulty with dual tasking   Organization/Problem Solving: No deficits identified  Executive Function:  Reports decreased efficiency with organization, multitasking, and attention in relation to higher complexity IADLs and work related  "tasks due to cognitive fatigue.    VISUAL SKILLS  Visual Acuity:  Blurred vision \"foggy\". Does not wear corrective lenses.  Visual Field: Appears normal  Visual Attention: Appears normal.  Oculomotor: Recommend further assessment.   Ophthalmology note reports vision WNL. Has a neuro ophthalmology note. Plans to see OP OT [facility].     SENSATION: UE Sensation WNL  [facility] Sensory Scales: Auditory: 19/56 =34% symptomatic; visual 14/40=34% symptomatic; Tactile: 11/52=21% symptomatic; Digestion: 5/52= 9% symptomatic. Primarily reporting discomfort and distraction with louder voices/louder environments. Reports blurry vision and visual fatigue, difficulties with extended screen time on phone/work computer.     POSTURE: WNL  RANGE OF MOTION: UE AROM WNL  STRENGTH:  General deconditioning noted due to increased sedentary activity outside high priority tasks.  MUSCLE TONE: WNL  COORDINATION: WNL  BALANCE:  Reports impaired balance. Per PT noting a postural sway with dynamic balance tasks.   Feeling deconditioning  Impaired balance    FUNCTIONAL MOBILITY  Assistive Device(s): None  Ambulation: IND household and community distance mobility.    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 Reports significantly difficulties completing household tasks, significantly increased sedentary activity, has " significantly decreased her social participation and leisure activities. Pt currently seeing PT.     INSTRUMENTAL ACTIVITIES OF DAILY LIVING (IADL): Significant decrease   Meal Planning/Prep: Completes meal prep 1x every 3 days. Has significantly decreased complexity of meal preparation, prefers to order out.   Home/Financial Management: Independent.  Communication/Computer Use: Uses a computer at work, uses a smart phone, reports visual fatigue with extended activity. Mild visual sensivities.  Community Mobility: Driving independently.   Care of Others:- N/A    Assessment & Plan   CLINICAL IMPRESSIONS  Medical Diagnosis: Post-COVID chronic fatigue (G93.32, U09.9), Blurred vision (H53.8), Post-COVID chronic concentration deficit (R41.840, U09.9), Post-COVID chronic decreased mobility and endurance (Z74.09, U09.9)    Treatment Diagnosis: Impaired ease, efficiency, and independence ADLs/IADLs    Impression/Assessment: Pt is a 33 year old female presenting to Occupational Therapy due to Long COVID fatigue, visual processing deficits, and autonomic dysfunction.  The following significant findings have been identified: Impaired activity tolerance, Impaired balance, Impaired sensory feedback, and Impaired visual perception.  These identified deficits interfere with their ability to perform self care tasks, work tasks, recreational activities, household chores, meal planning and preparation, and community or volunteer activities as compared to previous level of function.     Clinical Decision Making (Complexity):  Assessment of Occupational Performance: 3-5 Performance Deficits  Occupational Performance Limitations: health management and maintenance, home establishment and management, meal preparation and cleanup, work, leisure activities, and social participation  Clinical Decision Making (Complexity): Low complexity    PLAN OF CARE  Treatment Interventions:  Interventions: Self-Care/Home Management, Therapeutic Activity,  Therapeutic Exercise, Neuromuscular Re-education    Long Term Goals   OT Goal 1  Goal Identifier: Fatigue  Goal Description: Client will verbalize 2-3 energy conservation strategies (e.g. pacing, planning, prioritizing, etc.) to facilitate fatigue management with I/ADL and community tasks (e.g. laundry, cooking, socialization/meetings, etc.) as measured by score on FACIT of 35+ as indication of improved fatigue management.  Rationale: In order to maximize safety and independence with performance of self-care activities;In order to maximize safety and independence with functional transfers and functional mobility within the home or community;In order to maximize independence with tasks requiring functional cognition/executive function for school, work, medication or financial mgmt  Target Date: 04/08/24  OT Goal 2  Goal Identifier: Symptom Management  Goal Description: Client will identify and independently demonstrate 3 strategies (computer adaptations, self-awareness and self-management of symptoms, etc.) to decrease post-COVID 19 symptoms (such as visual sensitivity, fatigue, forgetfulness, decreased focus, sensitivity to noise, upset stomach, dizziness, mental fog, increased processing time, and headache).  Rationale: In order to maximize safety and independence with performance of self-care activities;In order to maximize safety and independence with cognitive function within the home or community;In order to maximize independence with tasks requiring functional cognition/executive function for school, work, medication or financial mgmt  Target Date: 04/08/24  OT Goal 3  Goal Identifier: Sensory Processing  Goal Description: Pt will complete the Safe and Sound Protocol and report at least 10% reduction in auditory processing hypersensitivities according the BBCSS outcome measures in order to tolerate moderate noise in community and work environments.  Rationale: In order to maximize safety and independence with  cognitive function within the home or community;In order to maximize independence with tasks requiring functional cognition/executive function for school, work, medication or financial mgmt  Target Date: 04/08/24      Frequency of Treatment: 1x/wk  Duration of Treatment: up to 90 days     Recommended Referrals to Other Professionals:  Already seeing OP PT; plans to transition to OP OT at [facility] for visual rehabilitation.  Education Assessment: Learner/Method: Patient  Education Comments: Provided education on role of OT in outpatient setting, goals, and the plan of care.     Risks and benefits of evaluation/treatment have been explained.   Patient/Family/caregiver agrees with Plan of Care.     Evaluation Time:    OT Vanessa Low Complexity Minutes (30802): 30    Signing Clinician: PAOLO Delgado

## 2024-01-16 ENCOUNTER — THERAPY VISIT (OUTPATIENT)
Dept: OCCUPATIONAL THERAPY | Facility: CLINIC | Age: 34
End: 2024-01-16
Attending: PHYSICIAN ASSISTANT
Payer: COMMERCIAL

## 2024-01-16 DIAGNOSIS — U09.9 POST-COVID CHRONIC CONCENTRATION DEFICIT: ICD-10-CM

## 2024-01-16 DIAGNOSIS — H53.8 BLURRED VISION: ICD-10-CM

## 2024-01-16 DIAGNOSIS — U09.9 POST-COVID CHRONIC DECREASED MOBILITY AND ENDURANCE: ICD-10-CM

## 2024-01-16 DIAGNOSIS — R41.840 POST-COVID CHRONIC CONCENTRATION DEFICIT: ICD-10-CM

## 2024-01-16 DIAGNOSIS — G93.32 POST-COVID CHRONIC FATIGUE: Primary | ICD-10-CM

## 2024-01-16 DIAGNOSIS — Z74.09 POST-COVID CHRONIC DECREASED MOBILITY AND ENDURANCE: ICD-10-CM

## 2024-01-16 DIAGNOSIS — U09.9 POST-COVID CHRONIC FATIGUE: Primary | ICD-10-CM

## 2024-01-16 PROCEDURE — 97535 SELF CARE MNGMENT TRAINING: CPT | Mod: GO | Performed by: OCCUPATIONAL THERAPIST

## 2024-01-24 ENCOUNTER — THERAPY VISIT (OUTPATIENT)
Dept: OCCUPATIONAL THERAPY | Facility: CLINIC | Age: 34
End: 2024-01-24
Attending: PHYSICIAN ASSISTANT
Payer: COMMERCIAL

## 2024-01-24 DIAGNOSIS — U09.9 POST-COVID CHRONIC FATIGUE: Primary | ICD-10-CM

## 2024-01-24 DIAGNOSIS — H53.8 BLURRED VISION: ICD-10-CM

## 2024-01-24 DIAGNOSIS — U09.9 POST-COVID CHRONIC DECREASED MOBILITY AND ENDURANCE: ICD-10-CM

## 2024-01-24 DIAGNOSIS — R41.840 POST-COVID CHRONIC CONCENTRATION DEFICIT: ICD-10-CM

## 2024-01-24 DIAGNOSIS — Z74.09 POST-COVID CHRONIC DECREASED MOBILITY AND ENDURANCE: ICD-10-CM

## 2024-01-24 DIAGNOSIS — U09.9 POST-COVID CHRONIC CONCENTRATION DEFICIT: ICD-10-CM

## 2024-01-24 DIAGNOSIS — G93.32 POST-COVID CHRONIC FATIGUE: Primary | ICD-10-CM

## 2024-01-24 PROCEDURE — 97535 SELF CARE MNGMENT TRAINING: CPT | Mod: GO | Performed by: OCCUPATIONAL THERAPIST

## 2024-02-16 ENCOUNTER — THERAPY VISIT (OUTPATIENT)
Dept: OCCUPATIONAL THERAPY | Facility: CLINIC | Age: 34
End: 2024-02-16
Attending: PHYSICIAN ASSISTANT
Payer: COMMERCIAL

## 2024-02-16 DIAGNOSIS — U09.9 POST-COVID CHRONIC DECREASED MOBILITY AND ENDURANCE: ICD-10-CM

## 2024-02-16 DIAGNOSIS — U09.9 POST-COVID CHRONIC CONCENTRATION DEFICIT: ICD-10-CM

## 2024-02-16 DIAGNOSIS — Z74.09 POST-COVID CHRONIC DECREASED MOBILITY AND ENDURANCE: ICD-10-CM

## 2024-02-16 DIAGNOSIS — H53.8 BLURRED VISION: ICD-10-CM

## 2024-02-16 DIAGNOSIS — G93.32 POST-COVID CHRONIC FATIGUE: Primary | ICD-10-CM

## 2024-02-16 DIAGNOSIS — R41.840 POST-COVID CHRONIC CONCENTRATION DEFICIT: ICD-10-CM

## 2024-02-16 DIAGNOSIS — U09.9 POST-COVID CHRONIC FATIGUE: Primary | ICD-10-CM

## 2024-02-16 PROCEDURE — 97535 SELF CARE MNGMENT TRAINING: CPT | Mod: GO | Performed by: OCCUPATIONAL THERAPIST

## 2024-02-16 NOTE — PROGRESS NOTES
DISCHARGE  Reason for Discharge: Patient has met all goals.    Equipment Issued: SSP Core/Balance, fatigue management strategies, post covid rehab protocol    Discharge Plan: Patient to continue home program.  Pt will continue OP PT/OT at [facility].     Referring Provider:  Jennifer Rodriguez     Outcome measure update 2/16/24  FACIT 26/52  [facility] Sensory Scales:  Auditory: 13/56 =23% symptomatic; visual 12/40=30% symptomatic; Tactile: 3/52=5% symptomatic; Digestion: 0/52= 0% symptomatic.     02/16/24 0500   Appointment Info   Treating Provider Sil Gomez OTR/L   Total/Authorized Visits [facility] OPEN ACCESS   Visits Used 4   Medical Diagnosis Post-COVID chronic fatigue (G93.32, U09.9), Blurred vision (H53.8), Post-COVID chronic concentration deficit (R41.840, U09.9), Post-COVID chronic decreased mobility and endurance (Z74.09, U09.9)   OT Tx Diagnosis Impaired ease, efficiency, and independence ADLs/IADLs   Precautions/Limitations None   Other pertinent information No coverage for sensory integration, cognitive rehab, community reintegration.   Progress Note/Certification   Onset of Illness/Injury or Date of Surgery 01/04/24   Therapy Frequency 1x/wk   Predicted Duration up to 90 days   Progress Note Due Date 04/08/24       Present No   Goals   OT Goals 1;2;3   OT Goal 1   Goal Identifier Fatigue   Goal Description Client will verbalize 2-3 energy conservation strategies (e.g. pacing, planning, prioritizing, etc.) to facilitate fatigue management with I/ADL and community tasks (e.g. laundry, cooking, socialization/meetings, etc.) as measured by score on FACIT of 35+ as indication of improved fatigue management.   Rationale In order to maximize safety and independence with performance of self-care activities;In order to maximize safety and independence with functional transfers and functional mobility within the home or community;In order  to maximize independence with tasks requiring functional cognition/executive function for school, work, medication or financial mgmt   Goal Progress Goal not met. FACIT scores not significantly improved. However, pt provided with several energy conservation strategies for fatigue management. Pt able to verbalize 5+ strategies for energy conservation specific to daily life/routine.   Target Date 04/08/24   OT Goal 2   Goal Identifier Symptom Management   Goal Description Client will identify and independently demonstrate 3 strategies (computer adaptations, self-awareness and self-management of symptoms, etc.) to decrease post-COVID 19 symptoms (such as visual sensitivity, fatigue, forgetfulness, decreased focus, sensitivity to noise, upset stomach, dizziness, mental fog, increased processing time, and headache).   Rationale In order to maximize safety and independence with performance of self-care activities;In order to maximize safety and independence with cognitive function within the home or community;In order to maximize independence with tasks requiring functional cognition/executive function for school, work, medication or financial mgmt   Goal Progress Goal met. Pt provided with strategies for symptom management in regards to fatigue management, visual sensitivities, and brain fog. Pt will continue with OP PT and OT at the [facility].   Target Date 04/08/24   Date Met 02/16/24   OT Goal 3   Goal Identifier Sensory Processing   Goal Description Pt will complete the Safe and Sound Protocol and report at least 10% reduction in auditory processing hypersensitivities according the BBCSS outcome measures in order to tolerate moderate noise in community and work environments.   Rationale In order to maximize safety and independence with cognitive function within the home or community;In order to maximize independence with tasks requiring functional cognition/executive function for school,  "work, medication or financial mgmt   Goal Progress Goal met. SSP Core completed. SSP balance initiated. BBCSS scores slightly improving. Pt reporting improved fatigue, otherwise limited changes in other sensory symptoms.   Target Date 04/08/24   Date Met 02/16/24   Subjective Report   Subjective Report Pt finished safe and sound core program and completed BBCSS. Pt reports continuing OP PT and OT at [facility]; thinks this is helping her symptoms of visual fatigue and dizziness. Pt reports that she spoke with her rehab supervisor regarding work accomodations and was given a variable response. She follows up with Post COVID provider within the month; will request a letter for fml 1 day weekly pending progression of symptoms and remaining PTO balance. Regarding fatigue management, pt reports that her boyfriend made modifications to her upstairs bathroom set up, so she no longer needs to climb stairs multiple times a day. Feels longer strings of \"good days\".   Objective Measures   Objective Measures Objective Measure 1;Objective Measure 2   Objective Measure 1   Objective Measure FACIT - Functional Assessment of Chronic Illness Therapy   Details 26/52 (35+ indicates a higher quality of life in relation to the impact of fatigue on daily life).   Objective Measure 2   Objective Measure [facility] Sensory Scales   Details [facility] Sensory Scales:  Auditory: 19/56 =34% symptomatic; visual 14/40=34% symptomatic; Tactile: 11/52=21% symptomatic; Digestion: 5/52= 9% symptomatic. Primarily reporting discomfort and distraction with louder voices/louder environments. Reports blurry vision and visual fatigue, difficulties with extended screen time on phone/work computer.   Treatment Interventions (OT)   Interventions Self Care/Home Management   Self Care/Home Management   Self-Care/Home Mgmt/ADL, Compensatory, Meal Prep Minutes (72733) 30 Minutes   Self Care 1 Symptom Management and " Fatigue Management   Self Care 1 - Details Reviewed results of SSP Core and BBCSS. Set up pt dashboard for SSP balance - reinforcing purpose and future use. Administered FACIT. Reinforced EC/WS and importance of incorporating into daily life. Pt. Instructed verbally in appropriate strategies modified as needed for pt.'s discharge location/assistance. Portions discussed this session include ;Rearranging Environment, home modifications, Eliminating Unnecessary Effort,planing Ahead, and Prioritizing. Pt. receptive of information providing appropriate conversational insight. Pt. Provided with hand out highlighting important techniques to utilize upon discharge for safety. 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   Also reinforced symptom management resources for vision. Education and training in managing vision symptoms: visual accommodations.  Stressed importance of resting brain as time and rest with allow it to heal.  Training in strategies to assist with visual accommodations: Light sensitivity- tinted glasses- medium plum (had patient trial all kinds to determine what they liked best and issued handout as  Reference), filters or overlays to reduce glare on printed pages, lighting (tasking lighting, floor lamps, types of lighting), dim lights, and use colored paper for written communication.  Minimize visual clutter- clean work areas, everything has a place, avoid horizontal blinds and patterned surfaces.  Block out distractions when reading- using a blank piece of paper as a guide for typoscope.  Consider large print- magnification or increase print size.  Changes in technology- decrease brightness, use f.lux lizzie, and use larger print or monitors.   Skilled Intervention Skilled education and training in fatigue management strategies in relation to Long COVID symptoms to improve ease, efficiency, and participation  in ADLs/IADLs.   Patient Response/Progress Patient engaged, receptive to recommendations and education. Pt in agreement with plan for discharge from OP OT at [facility] and continuation of PT/OT at the [facility]. Verbalizing understanding of recommendations for future referral as needed.   Education   Learner/Method Patient   Education Comments Provided education on role of OT in outpatient setting, goals, and the plan of care.   Plan   Home program SSP balance, fatigue management modules, symptom management (visual and auditory)   Plan for next session Discharge OT Rx.   Total Session Time   Timed Code Treatment Minutes 30   Total Treatment Time (sum of timed and untimed services) 30

## 2024-03-07 ENCOUNTER — APPOINTMENT (OUTPATIENT)
Dept: LAB | Facility: CLINIC | Age: 34
End: 2024-03-07
Payer: COMMERCIAL

## 2024-03-07 SDOH — SOCIAL STABILITY: SOCIAL NETWORK: I HAVE TROUBLE DOING ALL OF THE ACTIVITIES WITH FRIENDS THAT I WANT TO DO: USUALLY

## 2024-03-07 SDOH — SOCIAL STABILITY: SOCIAL NETWORK

## 2024-03-07 SDOH — SOCIAL STABILITY: SOCIAL NETWORK: I HAVE TROUBLE DOING ALL OF MY USUAL WORK (INCLUDE WORK AT HOME): USUALLY

## 2024-03-07 SDOH — SOCIAL STABILITY: SOCIAL NETWORK: I HAVE TROUBLE DOING ALL OF MY REGULAR LEISURE ACTIVITIES WITH OTHERS: USUALLY

## 2024-03-07 SDOH — SOCIAL STABILITY: SOCIAL NETWORK: PROMIS ABILITY TO PARTICIPATE IN SOCIAL ROLES & ACTIVITIES T-SCORE: 39

## 2024-03-07 SDOH — SOCIAL STABILITY: SOCIAL NETWORK: I HAVE TROUBLE DOING ALL OF THE FAMILY ACTIVITIES THAT I WANT TO DO: USUALLY

## 2024-03-07 ASSESSMENT — ANXIETY QUESTIONNAIRES
GAD7 TOTAL SCORE: 2
4. TROUBLE RELAXING: NOT AT ALL
5. BEING SO RESTLESS THAT IT IS HARD TO SIT STILL: NOT AT ALL
3. WORRYING TOO MUCH ABOUT DIFFERENT THINGS: NOT AT ALL
1. FEELING NERVOUS, ANXIOUS, OR ON EDGE: SEVERAL DAYS
2. NOT BEING ABLE TO STOP OR CONTROL WORRYING: NOT AT ALL
GAD7 TOTAL SCORE: 2
6. BECOMING EASILY ANNOYED OR IRRITABLE: SEVERAL DAYS
7. FEELING AFRAID AS IF SOMETHING AWFUL MIGHT HAPPEN: NOT AT ALL
7. FEELING AFRAID AS IF SOMETHING AWFUL MIGHT HAPPEN: NOT AT ALL
GAD7 TOTAL SCORE: 2

## 2024-03-07 ASSESSMENT — PATIENT HEALTH QUESTIONNAIRE - PHQ9
SUM OF ALL RESPONSES TO PHQ QUESTIONS 1-9: 5
SUM OF ALL RESPONSES TO PHQ QUESTIONS 1-9: 5
10. IF YOU CHECKED OFF ANY PROBLEMS, HOW DIFFICULT HAVE THESE PROBLEMS MADE IT FOR YOU TO DO YOUR WORK, TAKE CARE OF THINGS AT HOME, OR GET ALONG WITH OTHER PEOPLE: SOMEWHAT DIFFICULT

## 2024-03-14 ENCOUNTER — VIRTUAL VISIT (OUTPATIENT)
Dept: PHYSICAL MEDICINE AND REHAB | Facility: CLINIC | Age: 34
End: 2024-03-14
Payer: COMMERCIAL

## 2024-03-14 ENCOUNTER — TELEPHONE (OUTPATIENT)
Dept: PHYSICAL MEDICINE AND REHAB | Facility: CLINIC | Age: 34
End: 2024-03-14

## 2024-03-14 VITALS — BODY MASS INDEX: 24.84 KG/M2 | WEIGHT: 135 LBS | HEIGHT: 62 IN

## 2024-03-14 DIAGNOSIS — U09.9 POST-COVID CHRONIC CONCENTRATION DEFICIT: ICD-10-CM

## 2024-03-14 DIAGNOSIS — U09.9 POST-COVID CHRONIC FATIGUE: Primary | ICD-10-CM

## 2024-03-14 DIAGNOSIS — G93.32 POST-COVID CHRONIC FATIGUE: Primary | ICD-10-CM

## 2024-03-14 DIAGNOSIS — U09.9 LONG COVID: ICD-10-CM

## 2024-03-14 DIAGNOSIS — Z74.09 POST-COVID CHRONIC DECREASED MOBILITY AND ENDURANCE: ICD-10-CM

## 2024-03-14 DIAGNOSIS — H53.8 BLURRED VISION: ICD-10-CM

## 2024-03-14 DIAGNOSIS — U09.9 POST-COVID CHRONIC DECREASED MOBILITY AND ENDURANCE: ICD-10-CM

## 2024-03-14 DIAGNOSIS — R41.840 POST-COVID CHRONIC CONCENTRATION DEFICIT: ICD-10-CM

## 2024-03-14 DIAGNOSIS — E03.9 ACQUIRED HYPOTHYROIDISM: ICD-10-CM

## 2024-03-14 DIAGNOSIS — R26.89 IMBALANCE: ICD-10-CM

## 2024-03-14 PROCEDURE — 99214 OFFICE O/P EST MOD 30 MIN: CPT | Mod: 95 | Performed by: PHYSICIAN ASSISTANT

## 2024-03-14 ASSESSMENT — PAIN SCALES - GENERAL: PAINLEVEL: MILD PAIN (2)

## 2024-03-14 NOTE — LETTER
3/14/2024       RE: Corrina Beasley  [address] [facility] [address]       Dear Colleague,    Thank you for referring your patient, Corrina Beasley, to the [facility] PHYSICAL MEDICINE AND REHABILITATION CLINIC [place] at [facility]. Please see a copy of my visit note below.    Corrina Beasley is a 33 year old female who presents to be evaluated for a billable video visit.      Assessment/Impression   1. Post-COVID chronic fatigue/Post-COVID chronic concentration deficit/Post-COVID chronic decreased mobility and endurance  Patient with significant fatigue and concentration difficulties.  Patient also with postexertional malaise.Discussed energy conservation and provided information on fatigue management.   Patient is working on Pacing and has completed Safe and sound. She does also have thyroid dysfunction and will recheck levels as well as look at B12 for possible ongoing fatigue. Also discussed fatigue study and will contact provider who is directing the study directly.   - TSH; Future  - T4 free; Future  - T3 total; Future  - Thyroid peroxidase antibody; Future  - Thyroglobulin and Antibody Reflex; Future  - Vitamin B12; Future     2. Imbalance  Patient with issues with her balance and feeling as if she is going to fall backwards or to the side when standing. She is working with physical therapy at [place] [facility] [place] for her ongoing imbalance.      3. Blurred vision  Patient with intermittent blurred vision most consistent when she is trying to concentrate as well as drive at night.  Discussed having patient see neuro-ophthalmology again as well as [facility].      4. Acquired hypothyroidism  Patient does have history of hypothyroidism most recent levels were stable.  Patient has not had antibodies tested and discussed due to her ongoing fatigue will recheck as she adjusted her  dose.    - TSH; Future  - T4 free; Future  - T3 total; Future  - Thyroid peroxidase antibody; Future  - Thyroglobulin and Antibody Reflex; Future    5. Long COVID  Discussed COVID and Post COVID with patient.  Educational materials provided and all questions answered.    Plan:  I reviewed present knowledge on long-Covid.  Education was provided and question were answered.  Orders/Referrals as above  I will advised patient on test results  I will follow up with Corrnia Beasley in 3 Months. I will review progress and consider need for any other therapeutic interventions. If there are any questions and/or concerns she will call the clinic.      On day of encounter time spent in chart review and with patient in consultation, exam, education,coordination of care,  review of outside charts/documentation and documentation:  35 minutes     I have attempted to proof read for major spelling errors and apologize for any minor errors I may have missed.     This note was dictated using voice recognition software. Any grammatical or context distortions are unintentional and inherent to the software.  _________________________________  Jennifer Rodriguez PA-C  [facility] PHYSICAL MEDICINE AND REHABILITATION CLINIC [facility]     Patient returns for a follow up. She completed the safe and sound protocol and started Vision OT.  Patient is doing PT and vision therapy with [facility].  Patient is having more energy currently but is in the middle of being sick.  She still has post exertional malaise.  She struggles with fatigue and working and is trying to decrease her hours or have accomodation at work.  Patients brain fog is improved.  Overall patient is stable and slightly improved.     Current concerns: Job Concerns and Health Concerns      3/7/2024     6:56 PM   PHQ Assesment Total Score(s)   PHQ-9 Score 5           3/7/2024     6:56 PM   MARTHA-7 Results   MARTHA 7 TOTAL SCORE 2  (minimal anxiety)   MARTHA-7 Total Score 2         3/7/2024     6:57 PM   PTSD Screen Score   Have you ever experienced this kind of event? Yes   PTSD Screen (Score of 3 or more suggests positive screen) 0         3/7/2024     7:02 PM   PROMIS-29   PROMIS Physical Function T-Score 37 (moderate dysfunction)   PROMIS Anxiety T-Score 48 (within normal limits)   PROMIS Depression T-Score 54 (within normal limits)   PROMIS Fatigue T-Score 59 (mild)   PROMIS Sleep Disturbance T-Score 51 (within normal limits)   PROMIS Ability to Participate in Social Roles & Activities T-Score 39 (moderate dysfunction)   PROMIS Pain Interference T-Score 61 (moderate)   PROMIS Pain Intensity 4       No past medical history on file.    Past Surgical History:   Procedure Laterality Date    AS MYOMECTOMY 5/>,TOT>250 GMS,ABD APPRCH  02/11/2016    HC TOOTH EXTRACTION W/FORCEP  05/2009       Family History   Problem Relation Age of Onset    Hypertension Father     Alcoholism Father     Hepatitis Father         C    Cancer Maternal Grandfather     Hypothyroidism Paternal Grandmother     Cancer Paternal Grandfather     Hypothyroidism Paternal Aunt        Social History     Tobacco Use    Smoking status: Never    Smokeless tobacco: Never   Substance Use Topics    Alcohol use: Yes     Comment: 2 drinks or less per week    Drug use: Never         Current Outpatient Medications:     CALCIUM-VITAMIN D PO, Take by mouth daily , Disp: , Rfl:     cetirizine (ZYRTEC) 10 MG tablet, Take 10 mg by mouth daily, Disp: , Rfl:     levonorgestrel (MIRENA) 52 MG (20 mcg/day) IUD, 1 each by Intrauterine route, Disp: , Rfl:     melatonin 3 MG tablet, Take 2 mg by mouth nightly as needed for sleep, Disp: , Rfl:     Multiple Vitamin (MULTIVITAMIN PO), Take by mouth daily , Disp: , Rfl:     vitamin B-12 (CYANOCOBALAMIN) 1000 MCG tablet, Take 1 tablet by mouth daily, Disp: , Rfl:     Review of Systems   Constitutional, HEENT, cardiovascular, pulmonary, GI, ,  musculoskeletal, neuro, skin, endocrine and psych systems are negative, except as otherwise noted.      Objective         Vitals:  No vitals were obtained today due to virtual visit.    Physical Exam   EYES: Eyes grossly normal to inspection.  No discharge or erythema, or obvious scleral/conjunctival abnormalities.  SKIN: Visible skin clear. No significant rash, abnormal pigmentation or lesions.  NEURO: Cranial nerves grossly intact.  Mentation and speech appropriate for age.  GENERAL: Healthy, alert and no distress  RESP: No audible wheeze, cough, or visible cyanosis.  No visible retractions or increased work of breathing.    PSYCH: Mentation appears normal, affect normal/bright, judgement and insight intact, normal speech and appearance well-groomed.        Labs:   Lab on 01/05/2024   Component Date Value Ref Range Status    TSH 01/05/2024 3.39  0.30 - 4.20 uIU/mL Final    Free T4 01/05/2024 1.53  0.90 - 1.70 ng/dL Final    T3 Total 01/05/2024 77 (L)  85 - 202 ng/dL Final   Lab on 12/14/2023   Component Date Value Ref Range Status    Iron 12/14/2023 113  37 - 145 ug/dL Final    Ferritin 12/14/2023 51  6 - 175 ng/mL Final    Vitamin B6 12/14/2023 114.5  20.0 - 125.0 nmol/L Final    INTERPRETIVE INFORMATION: Vitamin B6 (Pyridoxal 5-Phosphate)    Pyridoxal 5'-phosphate measured in a specimen collected   following an 8-hour or overnight fast accurately indicates   vitamin B6 nutritional status. Non-fasting specimen   concentration reflects recent vitamin intake.    This test was developed and its performance characteristics   determined by [facility]. It has not been cleared or   approved by the US Food and Drug Administration. This test   was performed in a CLIA certified laboratory and is   intended for clinical purposes.  Performed By: [facility]  [address] [place] [id number]  : Kaushik Mercer MD, PhD  CLIA Number: [id number]    Thyroid Peroxidase Antibody  12/14/2023 257 (H)  <35 IU/mL Final    Thyroglobulin Antibody 12/14/2023 98 (H)  <40 IU/mL Final    Thyroglobulin by LC-MS/MS 12/14/2023 0.6 (L)  1.3 - 31.8 ng/mL Final      Results obtained with different test methods or kits cannot   be used interchangeably. Thyroglobulin results, regardless   of concentration, should not be interpreted as absolute   evidence for the presence or absence of papillary or   follicular thyroid cancer. Thyroglobulin testing is not   recommended for use as a screening procedure to detect the   presence of thyroid cancer in the general population.  INTERPRETIVE INFORMATION: Thyroglobulin by LC-MS/MS,   Serum/Plasma    Lower limit of detection for Thyroglobulin by LC-MS/MS is   0.5 ng/mL.    This test was developed and its performance characteristics   determined by [facility]. It has not been cleared or   approved by the US Food and Drug Administration. This test   was performed in a CLIA certified laboratory and is   intended for clinical purposes.  Performed By: [facility]  [address] [place] [id number]  : Kaushik Mercer MD, PhD  CLIA Number: [id number]   Lab on 11/27/2023   Component Date Value Ref Range Status    JOSÉ LUIS interpretation 11/27/2023 Negative  Negative Final      Negative:              <1:40  Borderline Positive:   1:40 - 1:80  Positive:              >1:80    Rheumatoid Factor 11/27/2023 <10  <14 IU/mL Final    Erythrocyte Sedimentation Rate 11/27/2023 8  0 - 20 mm/hr Final    Tissue Transglutaminase Antibody I* 11/27/2023 <0.2  <7.0 U/mL Final    Negative- The tTG-IgA assay has limited utility for patients with decreased levels of IgA. Screening for celiac disease should include IgA testing to rule out selective IgA deficiency and to guide selection and interpretation of serological testing. tTG-IgG testing may be positive in celiac disease patients with IgA deficiency.    Tissue Transglutaminase Antibody I* 11/27/2023 <0.6   <7.0 U/mL Final    Negative    TSH 11/27/2023 0.88  0.30 - 4.20 uIU/mL Final    Free T4 11/27/2023 1.83 (H)  0.90 - 1.70 ng/dL Final         Imaging:    I personally reviewed the following imaging results today and those on care everywhere, if indicated     Nothing new to review    Reviewed imaging from [facility] sites     Medical Records Reviewed:    Reviewed consults/documents from [facility] including Neurology and OT         Again, thank you for allowing me to participate in the care of your patient.      Sincerely,    Jennifer Rodriguez PA-C

## 2024-03-14 NOTE — TELEPHONE ENCOUNTER
Left Voicemail (1st Attempt) and Sent Mychart (1st Attempt) for the patient to call back and schedule the following:    Appointment type: Post-Covid Return Video Visit  Provider: Jennifer Rodriguez PA-C  Return date: Around 6/14/2024  Specialty phone number: [phone]  Additional appointment(s) needed: N/A  Additional Notes: N/A    Arron Tang on 3/14/2024 at 1:20 PM

## 2024-03-14 NOTE — NURSING NOTE
Is the patient currently in the state of [place]? YES    Visit mode:VIDEO    If the visit is dropped, the patient can be reconnected by: VIDEO VISIT: Text to cell phone:   Telephone Information:   Mobile [phone]       Will anyone else be joining the visit? NO  (If patient encounters technical issues they should call [phone]    How would you like to obtain your AVS? MyChart    Are changes needed to the allergy or medication list? Pt stated no changes to allergies and Pt stated no med changes    Reason for visit: LIBRADO LOCKEF

## 2024-03-14 NOTE — PROGRESS NOTES
Corrina Beasley is a 33 year old female who presents to be evaluated for a billable video visit.    Video-Visit Details    Video visit Start time:7:17 AM    Type of service:  Video Visit    Video End Time: 7:42 AM    Originating Location (pt. Location): Home    Distant Location (provider location):  Off- Site    Platform used for Video Visit: [facility]    Assessment/Impression   1. Post-COVID chronic fatigue/Post-COVID chronic concentration deficit/Post-COVID chronic decreased mobility and endurance  Patient with significant fatigue and concentration difficulties.  Patient also with postexertional malaise.Discussed energy conservation and provided information on fatigue management.   Patient is working on Pacing and has completed Safe and sound. She does also have thyroid dysfunction and will recheck levels as well as look at B12 for possible ongoing fatigue. Also discussed fatigue study and will contact provider who is directing the study directly.   - TSH; Future  - T4 free; Future  - T3 total; Future  - Thyroid peroxidase antibody; Future  - Thyroglobulin and Antibody Reflex; Future  - Vitamin B12; Future     2. Imbalance  Patient with issues with her balance and feeling as if she is going to fall backwards or to the side when standing. She is working with physical therapy at [facility] for her ongoing imbalance.      3. Blurred vision  Patient with intermittent blurred vision most consistent when she is trying to concentrate as well as drive at night.  Discussed having patient see neuro-ophthalmology again as well as continue working with [facility].      4. Acquired hypothyroidism  Patient does have history of hypothyroidism most recent levels were stable.  Patient has not had antibodies tested and discussed due to her ongoing fatigue will recheck as she adjusted her dose.    - TSH; Future  - T4 free; Future  - T3 total; Future  - Thyroid peroxidase  antibody; Future  - Thyroglobulin and Antibody Reflex; Future    5. Long COVID  Discussed COVID and Post COVID with patient.  Educational materials provided and all questions answered.    Plan:  I reviewed present knowledge on long-Covid.  Education was provided and question were answered.  Orders/Referrals as above  I will advised patient on test results  I will follow up with Corrina Beasley in 3 Months. I will review progress and consider need for any other therapeutic interventions. If there are any questions and/or concerns she will call the clinic.      On day of encounter time spent in chart review and with patient in consultation, exam, education,coordination of care,  review of outside charts/documentation and documentation:  35 minutes     I have attempted to proof read for major spelling errors and apologize for any minor errors I may have missed.     This note was dictated using voice recognition software. Any grammatical or context distortions are unintentional and inherent to the software.  _________________________________  Jennifer Rodriguez PA-C  [facility] PHYSICAL MEDICINE AND REHABILITATION CLINIC [facility]     Patient returns for a follow up. She completed the safe and sound protocol and started Vision OT.  Patient is doing PT and vision therapy with [facility].  Patient is having more energy currently but is in the middle of being sick.  She still has post exertional malaise.  She struggles with fatigue and working and is trying to decrease her hours or have accomodation at work.  Patients brain fog is improved.  Overall patient is stable and slightly improved.     Current concerns: Job Concerns and Health Concerns      3/7/2024     6:56 PM   PHQ Assesment Total Score(s)   PHQ-9 Score 5           3/7/2024     6:56 PM   MARTHA-7 Results   MARTHA 7 TOTAL SCORE 2 (minimal anxiety)   MARTHA-7 Total Score 2         3/7/2024     6:57 PM   PTSD Screen Score    Have you ever experienced this kind of event? Yes   PTSD Screen (Score of 3 or more suggests positive screen) 0         3/7/2024     7:02 PM   PROMIS-29   PROMIS Physical Function T-Score 37 (moderate dysfunction)   PROMIS Anxiety T-Score 48 (within normal limits)   PROMIS Depression T-Score 54 (within normal limits)   PROMIS Fatigue T-Score 59 (mild)   PROMIS Sleep Disturbance T-Score 51 (within normal limits)   PROMIS Ability to Participate in Social Roles & Activities T-Score 39 (moderate dysfunction)   PROMIS Pain Interference T-Score 61 (moderate)   PROMIS Pain Intensity 4       No past medical history on file.    Past Surgical History:   Procedure Laterality Date    AS MYOMECTOMY 5/>,TOT>250 GMS,ABD APPRCH  02/11/2016    HC TOOTH EXTRACTION W/FORCEP  05/2009       Family History   Problem Relation Age of Onset    Hypertension Father     Alcoholism Father     Hepatitis Father         C    Cancer Maternal Grandfather     Hypothyroidism Paternal Grandmother     Cancer Paternal Grandfather     Hypothyroidism Paternal Aunt        Social History     Tobacco Use    Smoking status: Never    Smokeless tobacco: Never   Substance Use Topics    Alcohol use: Yes     Comment: 2 drinks or less per week    Drug use: Never         Current Outpatient Medications:     CALCIUM-VITAMIN D PO, Take by mouth daily , Disp: , Rfl:     cetirizine (ZYRTEC) 10 MG tablet, Take 10 mg by mouth daily, Disp: , Rfl:     levonorgestrel (MIRENA) 52 MG (20 mcg/day) IUD, 1 each by Intrauterine route, Disp: , Rfl:     melatonin 3 MG tablet, Take 2 mg by mouth nightly as needed for sleep, Disp: , Rfl:     Multiple Vitamin (MULTIVITAMIN PO), Take by mouth daily , Disp: , Rfl:     vitamin B-12 (CYANOCOBALAMIN) 1000 MCG tablet, Take 1 tablet by mouth daily, Disp: , Rfl:     Review of Systems   Constitutional, HEENT, cardiovascular, pulmonary, GI, , musculoskeletal, neuro, skin, endocrine and psych systems are negative, except as otherwise noted.       Objective         Vitals:  No vitals were obtained today due to virtual visit.    Physical Exam   EYES: Eyes grossly normal to inspection.  No discharge or erythema, or obvious scleral/conjunctival abnormalities.  SKIN: Visible skin clear. No significant rash, abnormal pigmentation or lesions.  NEURO: Cranial nerves grossly intact.  Mentation and speech appropriate for age.  GENERAL: Healthy, alert and no distress  RESP: No audible wheeze, cough, or visible cyanosis.  No visible retractions or increased work of breathing.    PSYCH: Mentation appears normal, affect normal/bright, judgement and insight intact, normal speech and appearance well-groomed.        Labs:   Lab on 01/05/2024   Component Date Value Ref Range Status    TSH 01/05/2024 3.39  0.30 - 4.20 uIU/mL Final    Free T4 01/05/2024 1.53  0.90 - 1.70 ng/dL Final    T3 Total 01/05/2024 77 (L)  85 - 202 ng/dL Final   Lab on 12/14/2023   Component Date Value Ref Range Status    Iron 12/14/2023 113  37 - 145 ug/dL Final    Ferritin 12/14/2023 51  6 - 175 ng/mL Final    Vitamin B6 12/14/2023 114.5  20.0 - 125.0 nmol/L Final    INTERPRETIVE INFORMATION: Vitamin B6 (Pyridoxal 5-Phosphate)    Pyridoxal 5'-phosphate measured in a specimen collected   following an 8-hour or overnight fast accurately indicates   vitamin B6 nutritional status. Non-fasting specimen   concentration reflects recent vitamin intake.    This test was developed and its performance characteristics   determined by [facility]. It has not been cleared or   approved by the US Food and Drug Administration. This test   was performed in a CLIA certified laboratory and is   intended for clinical purposes.  Performed By: [facility]  [address] [place] [id number]  : Kaushik Mercer MD, PhD  CLIA Number: [id number]    Thyroid Peroxidase Antibody 12/14/2023 257 (H)  <35 IU/mL Final    Thyroglobulin Antibody 12/14/2023 98 (H)  <40 IU/mL Final     Thyroglobulin by LC-MS/MS 12/14/2023 0.6 (L)  1.3 - 31.8 ng/mL Final      Results obtained with different test methods or kits cannot   be used interchangeably. Thyroglobulin results, regardless   of concentration, should not be interpreted as absolute   evidence for the presence or absence of papillary or   follicular thyroid cancer. Thyroglobulin testing is not   recommended for use as a screening procedure to detect the   presence of thyroid cancer in the general population.  INTERPRETIVE INFORMATION: Thyroglobulin by LC-MS/MS,   Serum/Plasma    Lower limit of detection for Thyroglobulin by LC-MS/MS is   0.5 ng/mL.    This test was developed and its performance characteristics   determined by [facility]. It has not been cleared or   approved by the US Food and Drug Administration. This test   was performed in a CLIA certified laboratory and is   intended for clinical purposes.  Performed By: [facility]  [address] [place] [id number]  : Kaushik Mercer MD, PhD  CLIA Number: [id number]   Lab on 11/27/2023   Component Date Value Ref Range Status    JOSÉ LUIS interpretation 11/27/2023 Negative  Negative Final      Negative:              <1:40  Borderline Positive:   1:40 - 1:80  Positive:              >1:80    Rheumatoid Factor 11/27/2023 <10  <14 IU/mL Final    Erythrocyte Sedimentation Rate 11/27/2023 8  0 - 20 mm/hr Final    Tissue Transglutaminase Antibody I* 11/27/2023 <0.2  <7.0 U/mL Final    Negative- The tTG-IgA assay has limited utility for patients with decreased levels of IgA. Screening for celiac disease should include IgA testing to rule out selective IgA deficiency and to guide selection and interpretation of serological testing. tTG-IgG testing may be positive in celiac disease patients with IgA deficiency.    Tissue Transglutaminase Antibody I* 11/27/2023 <0.6  <7.0 U/mL Final    Negative    TSH 11/27/2023 0.88  0.30 - 4.20 uIU/mL Final    Free T4 11/27/2023  1.83 (H)  0.90 - 1.70 ng/dL Final         Imaging:    I personally reviewed the following imaging results today and those on care everywhere, if indicated     Nothing new to review    Reviewed imaging from [facility] sites     Medical Records Reviewed:    Reviewed consults/documents from [facility] including Neurology and OT

## 2024-03-18 ENCOUNTER — LAB (OUTPATIENT)
Dept: LAB | Facility: CLINIC | Age: 34
End: 2024-03-18
Payer: COMMERCIAL

## 2024-03-18 DIAGNOSIS — E03.9 ACQUIRED HYPOTHYROIDISM: ICD-10-CM

## 2024-03-18 DIAGNOSIS — G93.32 POST-COVID CHRONIC FATIGUE: ICD-10-CM

## 2024-03-18 DIAGNOSIS — U09.9 POST-COVID CHRONIC FATIGUE: ICD-10-CM

## 2024-03-18 LAB
T3 SERPL-MCNC: 88 NG/DL (ref 85–202)
T4 FREE SERPL-MCNC: 1.39 NG/DL (ref 0.9–1.7)
TSH SERPL DL<=0.005 MIU/L-ACNC: 1.42 UIU/ML (ref 0.3–4.2)
VIT B12 SERPL-MCNC: 701 PG/ML (ref 232–1245)

## 2024-03-18 PROCEDURE — 86800 THYROGLOBULIN ANTIBODY: CPT

## 2024-03-18 PROCEDURE — 86376 MICROSOMAL ANTIBODY EACH: CPT

## 2024-03-18 PROCEDURE — 36415 COLL VENOUS BLD VENIPUNCTURE: CPT

## 2024-03-18 PROCEDURE — 82607 VITAMIN B-12: CPT

## 2024-03-18 PROCEDURE — 84432 ASSAY OF THYROGLOBULIN: CPT | Mod: 90

## 2024-03-18 PROCEDURE — 84480 ASSAY TRIIODOTHYRONINE (T3): CPT

## 2024-03-18 PROCEDURE — 84443 ASSAY THYROID STIM HORMONE: CPT

## 2024-03-18 PROCEDURE — 99000 SPECIMEN HANDLING OFFICE-LAB: CPT

## 2024-03-18 PROCEDURE — 84439 ASSAY OF FREE THYROXINE: CPT

## 2024-03-19 LAB
THYROGLOB AB SERPL IA-ACNC: 112 IU/ML
THYROPEROXIDASE AB SERPL-ACNC: 339 IU/ML

## 2024-03-23 LAB — THYROGLOB SERPL-MCNC: <0.5 NG/ML

## 2024-04-17 ENCOUNTER — TELEPHONE (OUTPATIENT)
Dept: PHYSICAL MEDICINE AND REHAB | Facility: CLINIC | Age: 34
End: 2024-04-17
Payer: COMMERCIAL

## 2024-04-17 NOTE — TELEPHONE ENCOUNTER
[facility] Call Center    Phone Message    May a detailed message be left on voicemail: yes     Reason for Call: Other: Patient tested positive on Monday for covid. She is wondering if there is any guidelines to follow. Please call back when available.      Action Taken: Other: PMR    Travel Screening: Not Applicable

## 2024-04-18 NOTE — TELEPHONE ENCOUNTER
Reached out and spoke to pt, relayed the message from Jennifer COLMENARES stating; Patient will need to se her PCP for the Paxlovid. Pt agreed with this plan.

## 2024-06-12 SDOH — SOCIAL STABILITY: SOCIAL NETWORK: I HAVE TROUBLE DOING ALL OF MY USUAL WORK (INCLUDE WORK AT HOME): USUALLY

## 2024-06-12 SDOH — SOCIAL STABILITY: SOCIAL NETWORK: PROMIS ABILITY TO PARTICIPATE IN SOCIAL ROLES & ACTIVITIES T-SCORE: 37

## 2024-06-12 SDOH — SOCIAL STABILITY: SOCIAL NETWORK: I HAVE TROUBLE DOING ALL OF MY REGULAR LEISURE ACTIVITIES WITH OTHERS: USUALLY

## 2024-06-12 SDOH — SOCIAL STABILITY: SOCIAL NETWORK: I HAVE TROUBLE DOING ALL OF THE FAMILY ACTIVITIES THAT I WANT TO DO: ALWAYS

## 2024-06-12 SDOH — SOCIAL STABILITY: SOCIAL NETWORK

## 2024-06-12 SDOH — SOCIAL STABILITY: SOCIAL NETWORK: I HAVE TROUBLE DOING ALL OF THE ACTIVITIES WITH FRIENDS THAT I WANT TO DO: USUALLY

## 2024-06-12 ASSESSMENT — ANXIETY QUESTIONNAIRES
3. WORRYING TOO MUCH ABOUT DIFFERENT THINGS: NOT AT ALL
5. BEING SO RESTLESS THAT IT IS HARD TO SIT STILL: NOT AT ALL
7. FEELING AFRAID AS IF SOMETHING AWFUL MIGHT HAPPEN: NOT AT ALL
2. NOT BEING ABLE TO STOP OR CONTROL WORRYING: NOT AT ALL
4. TROUBLE RELAXING: NOT AT ALL
8. IF YOU CHECKED OFF ANY PROBLEMS, HOW DIFFICULT HAVE THESE MADE IT FOR YOU TO DO YOUR WORK, TAKE CARE OF THINGS AT HOME, OR GET ALONG WITH OTHER PEOPLE?: SOMEWHAT DIFFICULT
7. FEELING AFRAID AS IF SOMETHING AWFUL MIGHT HAPPEN: NOT AT ALL
GAD7 TOTAL SCORE: 2
1. FEELING NERVOUS, ANXIOUS, OR ON EDGE: SEVERAL DAYS
6. BECOMING EASILY ANNOYED OR IRRITABLE: SEVERAL DAYS
GAD7 TOTAL SCORE: 2
GAD7 TOTAL SCORE: 2
IF YOU CHECKED OFF ANY PROBLEMS ON THIS QUESTIONNAIRE, HOW DIFFICULT HAVE THESE PROBLEMS MADE IT FOR YOU TO DO YOUR WORK, TAKE CARE OF THINGS AT HOME, OR GET ALONG WITH OTHER PEOPLE: SOMEWHAT DIFFICULT

## 2024-06-12 ASSESSMENT — PATIENT HEALTH QUESTIONNAIRE - PHQ9
SUM OF ALL RESPONSES TO PHQ QUESTIONS 1-9: 9
SUM OF ALL RESPONSES TO PHQ QUESTIONS 1-9: 9
10. IF YOU CHECKED OFF ANY PROBLEMS, HOW DIFFICULT HAVE THESE PROBLEMS MADE IT FOR YOU TO DO YOUR WORK, TAKE CARE OF THINGS AT HOME, OR GET ALONG WITH OTHER PEOPLE: SOMEWHAT DIFFICULT

## 2024-06-13 ENCOUNTER — VIRTUAL VISIT (OUTPATIENT)
Dept: PHYSICAL MEDICINE AND REHAB | Facility: CLINIC | Age: 34
End: 2024-06-13
Payer: COMMERCIAL

## 2024-06-13 VITALS — HEIGHT: 62 IN | BODY MASS INDEX: 24.84 KG/M2 | WEIGHT: 135 LBS

## 2024-06-13 DIAGNOSIS — Z74.09 POST-COVID CHRONIC DECREASED MOBILITY AND ENDURANCE: ICD-10-CM

## 2024-06-13 DIAGNOSIS — U09.9 LONG COVID: ICD-10-CM

## 2024-06-13 DIAGNOSIS — H53.8 BLURRED VISION: ICD-10-CM

## 2024-06-13 DIAGNOSIS — U09.9 POST-COVID CHRONIC DECREASED MOBILITY AND ENDURANCE: ICD-10-CM

## 2024-06-13 DIAGNOSIS — U09.9 POST-COVID CHRONIC FATIGUE: Primary | ICD-10-CM

## 2024-06-13 DIAGNOSIS — R41.840 POST-COVID CHRONIC CONCENTRATION DEFICIT: ICD-10-CM

## 2024-06-13 DIAGNOSIS — E03.9 ACQUIRED HYPOTHYROIDISM: ICD-10-CM

## 2024-06-13 DIAGNOSIS — G93.32 POST-COVID CHRONIC FATIGUE: Primary | ICD-10-CM

## 2024-06-13 DIAGNOSIS — R26.89 IMBALANCE: ICD-10-CM

## 2024-06-13 DIAGNOSIS — U09.9 POST-COVID CHRONIC CONCENTRATION DEFICIT: ICD-10-CM

## 2024-06-13 PROCEDURE — 99213 OFFICE O/P EST LOW 20 MIN: CPT | Mod: 95 | Performed by: PHYSICIAN ASSISTANT

## 2024-06-13 RX ORDER — SERTRALINE HYDROCHLORIDE 25 MG/1
1 TABLET, FILM COATED ORAL DAILY
COMMUNITY
Start: 2024-05-31 | End: 2025-05-31

## 2024-06-13 ASSESSMENT — PAIN SCALES - GENERAL: PAINLEVEL: MILD PAIN (2)

## 2024-06-13 NOTE — NURSING NOTE
Is the patient currently in the state of [place]? YES    Visit mode:VIDEO    If the visit is dropped, the patient can be reconnected by: VIDEO VISIT: Send to e-mail at: [email or web address]    Will anyone else be joining the visit? NO  (If patient encounters technical issues they should call [phone]    How would you like to obtain your AVS? MyChart    Are changes needed to the allergy or medication list? Pt stated no changes to allergies and Pt stated no med changes    Are refills needed on medications prescribed by this physician? NO    Reason for visit: LIBRADO LOCKEF

## 2024-06-13 NOTE — PROGRESS NOTES
Corrina Beasley is a 33 year old female who presents to be evaluated for a billable video visit.    Video-Visit Details    Video visit Start time:7:16 AM    Type of service:  Video Visit    Video End Time: 7:31    Originating Location (pt. Location): Home    Distant Location (provider location):  Off- Site    Platform used for Video Visit: [employer]ouch    Assessment/Impression   1. Post-COVID chronic fatigue/Post-COVID chronic concentration deficit/Post-COVID chronic decreased mobility and endurance  Patient with slowly improving fatigue and concentration difficulties.  Patient also with postexertional malaise.Discussed energy conservation and provided information on fatigue management.   Patient is working on pacing and has completed Safe and sound. She does also have thyroid dysfunction and likely hashimoto's thyroiditis. She is already on synthroid but will reach out to Endocrinology directly. Patient reports increased fatigue around her cycle and is wondering if there is other hormone dysfunction.     2. Imbalance  Patient with improving balance .  She is working with physical therapy at [facility] for her ongoing balance disturbance and feels this is helping. Encouraged to continue. .      3. Blurred vision  Patient with improving  blurred vision after getting glasses.    Encouraged to continue working with occupational therapy for her vision therapy.      4. Acquired hypothyroidism  Patient does have history of hypothyroidism most recent levels were stable.  As above encouraged to continue working with primary care and will reach out to endocrinology to see if she needs formal assessment.      5. Long COVID  Discussed COVID and Post COVID with patient.  Educational materials provided and all questions answered.    Plan:  I reviewed present knowledge on long-Covid.  Education was provided and question were answered.  Orders/Referrals as above  I will advised patient on test results  I will  follow up with Corrina Beasley in 3 months. I will review progress and consider need for any other therapeutic interventions. If there are any questions and/or concerns he will call the clinic.      On day of encounter time spent in chart review and with patient in consultation, exam, education,coordination of care,  review of outside charts/documentation and documentation:  25 minutes     I have attempted to proof read for major spelling errors and apologize for any minor errors I may have missed.     This note was dictated using voice recognition software. Any grammatical or context distortions are unintentional and inherent to the software.  _________________________________  Jennifer Rodriguez PA-C  [facility] PHYSICAL MEDICINE AND REHABILITATION CLINIC [facility]   Patient contracted COVID again April 17th, 2024 but was unable to get Paxlovid.  Patient felt the month after she was ill she had a decline in her symptoms. She felt fatigue and concentration are back to where they were preinfection.  She started Zoloft. She got glasses 3 weeks ago and is adjusting to them. Patient is doing PT for balance and OT for vision.  Patient started intermittent FL[employer] for work and is working M-W and is of Thursday to Sunday. Patient does get fairly severe fatigue prior to her cycle.     Current concerns: Health Concerns      6/12/2024     7:05 PM   PHQ Assesment Total Score(s)   PHQ-9 Score 9           6/12/2024     7:05 PM   MARTHA-7 Results   MARTHA 7 TOTAL SCORE 2 (minimal anxiety)   MARTHA-7 Total Score 2         6/12/2024     7:06 PM   PTSD Screen Score   Have you ever experienced this kind of event? Yes   PTSD Screen (Score of 3 or more suggests positive screen) 0         6/12/2024     7:11 PM   PROMIS-29   PROMIS Physical Function T-Score 38 (moderate dysfunction)   PROMIS Anxiety T-Score 48 (within normal limits)   PROMIS Depression T-Score 59 (mild)   PROMIS Fatigue T-Score 63 (moderate)   PROMIS  Sleep Disturbance T-Score 48 (within normal limits)   PROMIS Ability to Participate in Social Roles & Activities T-Score 37 (moderate dysfunction)   PROMIS Pain Interference T-Score 56 (mild)   PROMIS Pain Intensity 3       No past medical history on file.    Past Surgical History:   Procedure Laterality Date    AS MYOMECTOMY 5/>,TOT>250 GMS,ABD APPH  02/11/2016    HC TOOTH EXTRACTION W/FORCEP  05/2009       Family History   Problem Relation Age of Onset    Hypertension Father     Alcoholism Father     Hepatitis Father         C    Cancer Maternal Grandfather     Hypothyroidism Paternal Grandmother     Cancer Paternal Grandfather     Hypothyroidism Paternal Aunt        Social History     Tobacco Use    Smoking status: Never    Smokeless tobacco: Never   Substance Use Topics    Alcohol use: Yes     Comment: 2 drinks or less per week    Drug use: Never         Current Outpatient Medications:     CALCIUM-VITAMIN D PO, Take by mouth daily , Disp: , Rfl:     cetirizine (ZYRTEC) 10 MG tablet, Take 10 mg by mouth daily, Disp: , Rfl:     levonorgestrel (MIRENA) 52 MG (20 mcg/day) IUD, 1 each by Intrauterine route, Disp: , Rfl:     melatonin 3 MG tablet, Take 2 mg by mouth nightly as needed for sleep, Disp: , Rfl:     Multiple Vitamin (MULTIVITAMIN PO), Take by mouth daily , Disp: , Rfl:     sertraline (ZOLOFT) 25 MG tablet, Take 1 tablet by mouth daily, Disp: , Rfl:     vitamin B-12 (CYANOCOBALAMIN) 1000 MCG tablet, Take 1 tablet by mouth daily, Disp: , Rfl:     Review of Systems   Constitutional, HEENT, cardiovascular, pulmonary, GI, , musculoskeletal, neuro, skin, endocrine and psych systems are negative, except as otherwise noted.      Objective         Vitals:  No vitals were obtained today due to virtual visit.    Physical Exam   EYES: Eyes grossly normal to inspection.  No discharge or erythema, or obvious scleral/conjunctival abnormalities.  SKIN: Visible skin clear. No significant rash, abnormal pigmentation or  lesions.  NEURO: Cranial nerves grossly intact.  Mentation and speech appropriate for age.  GENERAL: Healthy, alert and no distress  RESP: No audible wheeze, cough, or visible cyanosis.  No visible retractions or increased work of breathing.    PSYCH: Mentation appears normal, affect normal/bright, judgement and insight intact, normal speech and appearance well-groomed.      Labs:   Lab on 03/18/2024   Component Date Value Ref Range Status    TSH 03/18/2024 1.42  0.30 - 4.20 uIU/mL Final    Free T4 03/18/2024 1.39  0.90 - 1.70 ng/dL Final    T3 Total 03/18/2024 88  85 - 202 ng/dL Final    Thyroid Peroxidase Antibody 03/18/2024 339 (H)  <35 IU/mL Final    Thyroglobulin Antibody 03/18/2024 112 (H)  <40 IU/mL Final    Vitamin B12 03/18/2024 701  232 - 1,245 pg/mL Final    Thyroglobulin by LC-MS/MS 03/18/2024 <0.5 (L)  1.3 - 31.8 ng/mL Final      Results obtained with different test methods or kits cannot   be used interchangeably. Thyroglobulin results, regardless   of concentration, should not be interpreted as absolute   evidence for the presence or absence of papillary or   follicular thyroid cancer. Thyroglobulin testing is not   recommended for use as a screening procedure to detect the   presence of thyroid cancer in the general population.  INTERPRETIVE INFORMATION: Thyroglobulin by LC-MS/MS,   Serum/Plasma    Lower limit of detection for Thyroglobulin by LC-MS/MS is   0.5 ng/mL.    This test was developed and its performance characteristics   determined by [facility]. It has not been cleared or   approved by the US Food and Drug Administration. This test   was performed in a CLIA certified laboratory and is   intended for clinical purposes.  Performed By: [facility]  [address] [place] [id number]  : Kaushik Mercer MD, PhD  CLIA Number: [id number]         Imaging:    I personally reviewed the following imaging results today and those on care everywhere, if  indicated     Nothing new to review    Reviewed imaging from [facility] sites     Medical Records Reviewed:    Reviewed consults/documents from [facility] including  Family practice

## 2024-06-13 NOTE — LETTER
6/13/2024       RE: Corrina Beasley  [address] [address]       Dear Colleague,    Thank you for referring your patient, Corrina Beasley, to the [facility] PHYSICAL MEDICINE AND REHABILITATION CLINIC [place] [facility]. Please see a copy of my visit note below.    Corrina Beasley is a 33 year old female who presents to be evaluated for a billable video visit.      Assessment/Impression   1. Post-COVID chronic fatigue/Post-COVID chronic concentration deficit/Post-COVID chronic decreased mobility and endurance  Patient with slowly improving fatigue and concentration difficulties.  Patient also with postexertional malaise.Discussed energy conservation and provided information on fatigue management.   Patient is working on pacing and has completed Safe and sound. She does also have thyroid dysfunction and likely hashimoto's thyroiditis. She is already on synthroid but will reach out to Endocrinology directly. Patient reports increased fatigue around her cycle and is wondering if there is other hormone dysfunction.     2. Imbalance  Patient with improving balance .  She is working with physical therapy at [place] [facility] for her ongoing balance disturbance and feels this is helping. Encouraged to continue. .      3. Blurred vision  Patient with improving  blurred vision after getting glasses.    Encouraged to continue working with occupational therapy for her vision therapy.      4. Acquired hypothyroidism  Patient does have history of hypothyroidism most recent levels were stable.  As above encouraged to continue working with primary care and will reach out to endocrinology to see if she needs formal assessment.      5. Long COVID  Discussed COVID and Post COVID with patient.  Educational materials provided and all questions answered.    Plan:  I reviewed present knowledge on long-Covid.  Education was provided and question were  answered.  Orders/Referrals as above  I will advised patient on test results  I will follow up with Corrina Beasley in 3 months. I will review progress and consider need for any other therapeutic interventions. If there are any questions and/or concerns he will call the clinic.      On day of encounter time spent in chart review and with patient in consultation, exam, education,coordination of care,  review of outside charts/documentation and documentation:  25 minutes     I have attempted to proof read for major spelling errors and apologize for any minor errors I may have missed.     This note was dictated using voice recognition software. Any grammatical or context distortions are unintentional and inherent to the software.  _________________________________  Jennifer Rodriguez PA-C  [facility] PHYSICAL MEDICINE AND REHABILITATION CLINIC [facility]   Patient contracted COVID again April 17th, 2024 but was unable to get Paxlovid.  Patient felt the month after she was ill she had a decline in her symptoms. She felt fatigue and concentration are back to where they were preinfection.  She started Zoloft. She got glasses 3 weeks ago and is adjusting to them. Patient is doing PT for balance and OT for vision.  Patient started intermittent [employer] for work and is working M-W and is of Thursday to Sunday. Patient does get fairly severe fatigue prior to her cycle.     Current concerns: Health Concerns      6/12/2024     7:05 PM   PHQ Assesment Total Score(s)   PHQ-9 Score 9           6/12/2024     7:05 PM   MARTHA-7 Results   MARTHA 7 TOTAL SCORE 2 (minimal anxiety)   MARTAH-7 Total Score 2         6/12/2024     7:06 PM   PTSD Screen Score   Have you ever experienced this kind of event? Yes   PTSD Screen (Score of 3 or more suggests positive screen) 0         6/12/2024     7:11 PM   PROMIS-29   PROMIS Physical Function T-Score 38 (moderate dysfunction)   PROMIS Anxiety T-Score 48 (within normal limits)    PROMIS Depression T-Score 59 (mild)   PROMIS Fatigue T-Score 63 (moderate)   PROMIS Sleep Disturbance T-Score 48 (within normal limits)   PROMIS Ability to Participate in Social Roles & Activities T-Score 37 (moderate dysfunction)   PROMIS Pain Interference T-Score 56 (mild)   PROMIS Pain Intensity 3       No past medical history on file.    Past Surgical History:   Procedure Laterality Date    AS MYOMECTOMY 5/>,TOT>250 GMS,ABD APPRCH  02/11/2016    HC TOOTH EXTRACTION W/FORCEP  05/2009       Family History   Problem Relation Age of Onset    Hypertension Father     Alcoholism Father     Hepatitis Father         C    Cancer Maternal Grandfather     Hypothyroidism Paternal Grandmother     Cancer Paternal Grandfather     Hypothyroidism Paternal Aunt        Social History     Tobacco Use    Smoking status: Never    Smokeless tobacco: Never   Substance Use Topics    Alcohol use: Yes     Comment: 2 drinks or less per week    Drug use: Never         Current Outpatient Medications:     CALCIUM-VITAMIN D PO, Take by mouth daily , Disp: , Rfl:     cetirizine (ZYRTEC) 10 MG tablet, Take 10 mg by mouth daily, Disp: , Rfl:     levonorgestrel (MIRENA) 52 MG (20 mcg/day) IUD, 1 each by Intrauterine route, Disp: , Rfl:     melatonin 3 MG tablet, Take 2 mg by mouth nightly as needed for sleep, Disp: , Rfl:     Multiple Vitamin (MULTIVITAMIN PO), Take by mouth daily , Disp: , Rfl:     sertraline (ZOLOFT) 25 MG tablet, Take 1 tablet by mouth daily, Disp: , Rfl:     vitamin B-12 (CYANOCOBALAMIN) 1000 MCG tablet, Take 1 tablet by mouth daily, Disp: , Rfl:     Review of Systems   Constitutional, HEENT, cardiovascular, pulmonary, GI, , musculoskeletal, neuro, skin, endocrine and psych systems are negative, except as otherwise noted.      Objective         Vitals:  No vitals were obtained today due to virtual visit.    Physical Exam   EYES: Eyes grossly normal to inspection.  No discharge or erythema, or obvious scleral/conjunctival  abnormalities.  SKIN: Visible skin clear. No significant rash, abnormal pigmentation or lesions.  NEURO: Cranial nerves grossly intact.  Mentation and speech appropriate for age.  GENERAL: Healthy, alert and no distress  RESP: No audible wheeze, cough, or visible cyanosis.  No visible retractions or increased work of breathing.    PSYCH: Mentation appears normal, affect normal/bright, judgement and insight intact, normal speech and appearance well-groomed.      Labs:   Lab on 03/18/2024   Component Date Value Ref Range Status    TSH 03/18/2024 1.42  0.30 - 4.20 uIU/mL Final    Free T4 03/18/2024 1.39  0.90 - 1.70 ng/dL Final    T3 Total 03/18/2024 88  85 - 202 ng/dL Final    Thyroid Peroxidase Antibody 03/18/2024 339 (H)  <35 IU/mL Final    Thyroglobulin Antibody 03/18/2024 112 (H)  <40 IU/mL Final    Vitamin B12 03/18/2024 701  232 - 1,245 pg/mL Final    Thyroglobulin by LC-MS/MS 03/18/2024 <0.5 (L)  1.3 - 31.8 ng/mL Final      Results obtained with different test methods or kits cannot   be used interchangeably. Thyroglobulin results, regardless   of concentration, should not be interpreted as absolute   evidence for the presence or absence of papillary or   follicular thyroid cancer. Thyroglobulin testing is not   recommended for use as a screening procedure to detect the   presence of thyroid cancer in the general population.  INTERPRETIVE INFORMATION: Thyroglobulin by LC-MS/MS,   Serum/Plasma    Lower limit of detection for Thyroglobulin by LC-MS/MS is   0.5 ng/mL.    This test was developed and its performance characteristics   determined by [facility]. It has not been cleared or   approved by the US Food and Drug Administration. This test   was performed in a CLIA certified laboratory and is   intended for clinical purposes.  Performed By: [facility]  [address] [place] [id number]  : Kaushik Mercer MD, PhD  CLIA Number: [id number]         Imaging:    I  personally reviewed the following imaging results today and those on care everywhere, if indicated     Nothing new to review    Reviewed imaging from [facility] sites     Medical Records Reviewed:    Reviewed consults/documents from [facility] including  Family practice          Again, thank you for allowing me to participate in the care of your patient.      Sincerely,    Jennifer Rodriguez PA-C

## 2024-06-13 NOTE — Clinical Note
Dr. Reynoso, She has increasing thyroid antibodies and continued significant fatigue around her cycle. Should I put in a referral?  Jennifer Rodriguez PA-C

## 2024-08-25 ENCOUNTER — HEALTH MAINTENANCE LETTER (OUTPATIENT)
Age: 34
End: 2024-08-25

## 2024-09-08 SDOH — SOCIAL STABILITY: SOCIAL NETWORK

## 2024-09-08 SDOH — SOCIAL STABILITY: SOCIAL NETWORK: I HAVE TROUBLE DOING ALL OF THE ACTIVITIES WITH FRIENDS THAT I WANT TO DO: ALWAYS

## 2024-09-08 SDOH — SOCIAL STABILITY: SOCIAL NETWORK: I HAVE TROUBLE DOING ALL OF THE FAMILY ACTIVITIES THAT I WANT TO DO: USUALLY

## 2024-09-08 SDOH — SOCIAL STABILITY: SOCIAL NETWORK: I HAVE TROUBLE DOING ALL OF MY USUAL WORK (INCLUDE WORK AT HOME): ALWAYS

## 2024-09-08 SDOH — SOCIAL STABILITY: SOCIAL NETWORK: PROMIS ABILITY TO PARTICIPATE IN SOCIAL ROLES & ACTIVITIES T-SCORE: 36

## 2024-09-08 SDOH — SOCIAL STABILITY: SOCIAL NETWORK: I HAVE TROUBLE DOING ALL OF MY REGULAR LEISURE ACTIVITIES WITH OTHERS: USUALLY

## 2024-09-08 ASSESSMENT — ANXIETY QUESTIONNAIRES
GAD7 TOTAL SCORE: 2
7. FEELING AFRAID AS IF SOMETHING AWFUL MIGHT HAPPEN: NOT AT ALL
GAD7 TOTAL SCORE: 2
8. IF YOU CHECKED OFF ANY PROBLEMS, HOW DIFFICULT HAVE THESE MADE IT FOR YOU TO DO YOUR WORK, TAKE CARE OF THINGS AT HOME, OR GET ALONG WITH OTHER PEOPLE?: NOT DIFFICULT AT ALL
GAD7 TOTAL SCORE: 2

## 2024-09-08 ASSESSMENT — PATIENT HEALTH QUESTIONNAIRE - PHQ9
SUM OF ALL RESPONSES TO PHQ QUESTIONS 1-9: 6
10. IF YOU CHECKED OFF ANY PROBLEMS, HOW DIFFICULT HAVE THESE PROBLEMS MADE IT FOR YOU TO DO YOUR WORK, TAKE CARE OF THINGS AT HOME, OR GET ALONG WITH OTHER PEOPLE: SOMEWHAT DIFFICULT
SUM OF ALL RESPONSES TO PHQ QUESTIONS 1-9: 6

## 2024-09-13 ENCOUNTER — VIRTUAL VISIT (OUTPATIENT)
Dept: PHYSICAL MEDICINE AND REHAB | Facility: CLINIC | Age: 34
End: 2024-09-13
Payer: COMMERCIAL

## 2024-09-13 VITALS — BODY MASS INDEX: 25.4 KG/M2 | HEIGHT: 62 IN | WEIGHT: 138 LBS

## 2024-09-13 DIAGNOSIS — U09.9 POST-COVID CHRONIC FATIGUE: Primary | ICD-10-CM

## 2024-09-13 DIAGNOSIS — H53.8 BLURRED VISION: ICD-10-CM

## 2024-09-13 DIAGNOSIS — R26.89 IMBALANCE: ICD-10-CM

## 2024-09-13 DIAGNOSIS — R41.840 POST-COVID CHRONIC CONCENTRATION DEFICIT: ICD-10-CM

## 2024-09-13 DIAGNOSIS — U09.9 LONG COVID: ICD-10-CM

## 2024-09-13 DIAGNOSIS — G93.32 POST-COVID CHRONIC FATIGUE: Primary | ICD-10-CM

## 2024-09-13 DIAGNOSIS — U09.9 POST-COVID CHRONIC CONCENTRATION DEFICIT: ICD-10-CM

## 2024-09-13 PROCEDURE — 99213 OFFICE O/P EST LOW 20 MIN: CPT | Mod: 95 | Performed by: PHYSICIAN ASSISTANT

## 2024-09-13 ASSESSMENT — PAIN SCALES - GENERAL: PAINLEVEL: MILD PAIN (2)

## 2024-09-13 NOTE — NURSING NOTE
Current patient location:  Work    Is the patient currently in the state of [place]? YES    Visit mode:VIDEO    If the visit is dropped, the patient can be reconnected by: VIDEO VISIT: Send to e-mail at: [email or web address]    Will anyone else be joining the visit? NO  (If patient encounters technical issues they should call [phone]    How would you like to obtain your AVS? MyChart    Are changes needed to the allergy or medication list? Pt stated no changes to allergies and Pt stated no med changes    Are refills needed on medications prescribed by this physician? NO    Rooming Documentation:  Questionnaire(s) completed      Reason for visit: LIBRADO LOCKEF

## 2024-09-13 NOTE — LETTER
9/13/2024       RE: Corrina Beasley  [address] [facility] [address] [facility] [address]     Dear Colleague,    Thank you for referring your patient, Corrina Beasley, to the [facility] [place] [facility]. Please see a copy of my visit note below.    Corrina Beasley is a 34 year old female who presents to be evaluated for a billable video visit.    Video-Visit Details    Video visit Start time:11:39 AM    Type of service:  Video Visit    Video End Time:11:58 AM    Originating Location (pt. Location): Home    Distant Location (provider location):  Off- Site    Platform used for Video Visit: SnapHealth    Assessment/Impression   1. Post-COVID chronic fatigue/Post-COVID chronic concentration deficit  Patient with slowly improving fatigue and concentration difficulties.  Discussed energy conservation and provided information on fatigue management.   Patient is working on pacing and has completed Safe and sound.  Patient reports increased fatigue around her cycle and is wondering if there is other hormone dysfunction. Discussed possible smitha technique if still not improving in future. Will start acupuncture with Rosario Carrillo.   -Other specialty referral; future     2. Imbalance  Patient with improving balance.  She is working with physical therapy at [facility] for her ongoing balance disturbance and feels this is helping. Encouraged to continue. .     3. Blurred vision  Patient with improving  blurred vision after getting glasses.    Encouraged to continue working with occupational therapy for her vision therapy.       4. Long COVID  Discussed COVID and Post COVID with patient.  Educational materials provided and all questions answered.       Plan:  I reviewed present knowledge on long-Covid.  Education was provided and question were answered.  Orders/Referrals as above  I will advised patient on test results  I will  follow up with Corrina Beasley in 6 months. I will review progress and consider need for any other therapeutic interventions. If there are any questions and/or concerns she will call the clinic.      On day of encounter time spent in chart review and with patient in consultation, exam, education,coordination of care,  review of outside charts/documentation and documentation:  25 minutes     I have attempted to proof read for major spelling errors and apologize for any minor errors I may have missed.     This note was dictated using voice recognition software. Any grammatical or context distortions are unintentional and inherent to the software.  _________________________________  Jennifer Rodriguez PA-C  [facility] PHYSICAL MEDICINE AND REHABILITATION CLINIC [facility]   Patient returns for a follow up. She is working 5x a week and is tolerating this ok right now.  Her fatigue varies with menstrual cycles.  She was discussing PMDD with her PCP.  Patient is back down to 88mcg of Thyroid.  Patient feels her blurred vision is improving. She is still doing PT/OT.  She still has off days with her balance. Patient did get a SGB while in [place] and feels this helped.  Overall improving but still with some imbalance, fatigue and concentration difficulties.      Current concerns: Health Concerns      9/8/2024     5:21 PM   PHQ Assesment Total Score(s)   PHQ-9 Score 6           9/8/2024     5:22 PM   MARTHA-7 Results   MARTHA 7 TOTAL SCORE 2 (minimal anxiety)   MARTHA-7 Total Score 2         9/8/2024     5:22 PM   PTSD Screen Score   Have you ever experienced this kind of event? Yes   PTSD Screen (Score of 3 or more suggests positive screen) 0         9/8/2024     5:27 PM   PROMIS-29   PROMIS Physical Function T-Score 39 (moderate dysfunction)   PROMIS Anxiety T-Score 54 (within normal limits)   PROMIS Depression T-Score 59 (mild)   PROMIS Fatigue T-Score 59 (mild)   PROMIS Sleep Disturbance T-Score 54  (within normal limits)   PROMIS Ability to Participate in Social Roles & Activities T-Score 36 (moderate dysfunction)   PROMIS Pain Interference T-Score 52 (within normal limits)   PROMIS Pain Intensity 3     No past medical history on file.    Past Surgical History:   Procedure Laterality Date     [facility] 5[facility]     HC TOOTH EXTRACTION W/FORCEP  05/2009       Family History   Problem Relation Age of Onset     Hypertension Father      Alcoholism Father      Hepatitis Father         C     Cancer Maternal Grandfather      Hypothyroidism Paternal Grandmother      Cancer Paternal Grandfather      Hypothyroidism Paternal Aunt        Social History     Tobacco Use     Smoking status: Never     Smokeless tobacco: Never   Substance Use Topics     Alcohol use: Yes     Comment: 2 drinks or less per week     Drug use: Never         Current Outpatient Medications:      CALCIUM-VITAMIN D PO, Take by mouth daily , Disp: , Rfl:      cetirizine (ZYRTEC) 10 MG tablet, Take 10 mg by mouth daily, Disp: , Rfl:      levonorgestrel (MIRENA) 52 MG (20 mcg/day) IUD, 1 each by Intrauterine route, Disp: , Rfl:      melatonin 3 MG tablet, Take 2 mg by mouth nightly as needed for sleep, Disp: , Rfl:      Multiple Vitamin (MULTIVITAMIN PO), Take by mouth daily , Disp: , Rfl:      sertraline (ZOLOFT) 25 MG tablet, Take 1 tablet by mouth daily, Disp: , Rfl:      vitamin B-12 (CYANOCOBALAMIN) 1000 MCG tablet, Take 1 tablet by mouth daily, Disp: , Rfl:     Review of Systems   Constitutional, HEENT, cardiovascular, pulmonary, gi and gu systems are negative, except as otherwise noted.      Objective         Vitals:  No vitals were obtained today due to virtual visit.    Physical Exam   EYES: Eyes grossly normal to inspection.  No discharge or erythema, or obvious scleral/conjunctival abnormalities.  SKIN: Visible skin clear. No significant rash, abnormal pigmentation or lesions.  NEURO: Cranial nerves grossly  intact.  Mentation and speech appropriate for age.  GENERAL: Healthy, alert and no distress  RESP: No audible wheeze, cough, or visible cyanosis.  No visible retractions or increased work of breathing.    PSYCH: Mentation appears normal, affect normal/bright, judgement and insight intact, normal speech and appearance well-groomed.    Labs :  TSH  Order: [id number]  Component  Ref Range & Units 2 mo ago   TSH, Sensitive  0.30 - 4.50 uIU/mL 0.66   Resulting [facility] C[facility]TRAL LAB       Specimen Collected: 07/11/24  3:54 PM       Imaging:    I personally reviewed the following imaging results today and those on care everywhere, if indicated     Nothing new to review    Reviewed imaging from [facility] sites     Medical Records Reviewed:    Reviewed consults/documents from [facility] including  Family Practice,  and PT       Again, thank you for allowing me to participate in the care of your patient.      Sincerely,    Jennifer Rodriguez PA-C

## 2024-09-13 NOTE — PATIENT INSTRUCTIONS
Hydroxyzine 10 mg  (Vistaril/ Atarax)    Post COVID Self Care Suggestions:     Fatigue Management:       https://www.archives-pmr.org/action/showPdf?yyt=-5296%2819%1586151-4       Self Care:      [email or web address].[facility]/pain-care/self-care/  Recovery World Health Organization:    https://apps.who.int/iris/bitstream/handle/94980/832929/EXX-YEBO-1532-356-40780-06414-eng.pdf  Breathing exercises:    https://www.[facility].org/health/conditions-and-diseases/coronavirus/coronavirus-recovery-breathing-exercises      Assessment of sense of smell and taste    Smell training:  Flowery - Shu  Fruity - Lemon  Spicy - Cloves  Resinous - Eucalyptus      1 - Pour a few droplets of one of the oils on to a cotton pad or ball  2 - Do not try to sniff the pad immediately; leave it for a few minutes for the fragrance to develop  3 - Hold the first stick/pad up to your nose, about an inch away.  The order in which you test the oils does not matter  4 - Relax and try to inhale naturally through the nose - sniffing too quickly and deeply is likely to result in you not being able to detect anything  5 - Try this a couple more times, then rest for five minutes  6 - Move on to the next oil and repeat as above.    After three months switch to a new set of odors: menthol, thyme, tangerine, and shanice, training with them twice daily.    After another three months, switch to a third new set of odors: green tea, bergamot, rosemary, and jared, again training with them twice daily.    Studies:   [place] Paxlovid [place]  https://medicine.sana.edu/cii/research/paxlc-study/?mibextid=Zxz2cZ    Cognitive Post-COVID study  z.[facility]/covid-ema    Supplements:  Fatigue- COQ10 100mg 3x day  ( side effects GI)  Brain fog-N-acetylcysteine 600 mg daily (side effects GI)

## 2024-09-13 NOTE — PROGRESS NOTES
Corrina Beasley is a 34 year old female who presents to be evaluated for a billable video visit.    Video-Visit Details    Video visit Start time:11:39 AM    Type of service:  Video Visit    Video End Time:11:58 AM    Originating Location (pt. Location): Home    Distant Location (provider location):  Off- Site    Platform used for Video Visit: BBOXX    Assessment/Impression   1. Post-COVID chronic fatigue/Post-COVID chronic concentration deficit  Patient with slowly improving fatigue and concentration difficulties.  Discussed energy conservation and provided information on fatigue management.   Patient is working on pacing and has completed Safe and sound.  Patient reports increased fatigue around her cycle and is wondering if there is other hormone dysfunction. Discussed possible smitha technique if still not improving in future. Will start acupuncture with Rosario Carrillo.   -Other specialty referral; future     2. Imbalance  Patient with improving balance.  She is working with physical therapy at [facility] for her ongoing balance disturbance and feels this is helping. Encouraged to continue. .     3. Blurred vision  Patient with improving  blurred vision after getting glasses.    Encouraged to continue working with occupational therapy for her vision therapy.       4. Long COVID  Discussed COVID and Post COVID with patient.  Educational materials provided and all questions answered.       Plan:  I reviewed present knowledge on long-Covid.  Education was provided and question were answered.  Orders/Referrals as above  I will advised patient on test results  I will follow up with Corrina Beasley in 6 months. I will review progress and consider need for any other therapeutic interventions. If there are any questions and/or concerns she will call the clinic.      On day of encounter time spent in chart review and with patient in consultation, exam, education,coordination of care,  review of  outside charts/documentation and documentation:  25 minutes     I have attempted to proof read for major spelling errors and apologize for any minor errors I may have missed.     This note was dictated using voice recognition software. Any grammatical or context distortions are unintentional and inherent to the software.  _________________________________  Jennifer Rodriguez PA-C  [facility] PHYSICAL MEDICINE AND REHABILITATION CLINIC [place]    Subjective   HPI   Patient returns for a follow up. She is working 5x a week and is tolerating this ok right now.  Her fatigue varies with menstrual cycles.  She was discussing PMDD with her PCP.  Patient is back down to 88mcg of Thyroid.  Patient feels her blurred vision is improving. She is still doing PT/OT.  She still has off days with her balance. Patient did get a SGB while in [place] and feels this helped.  Overall improving but still with some imbalance, fatigue and concentration difficulties.      Current concerns: Health Concerns      9/8/2024     5:21 PM   PHQ Assesment Total Score(s)   PHQ-9 Score 6           9/8/2024     5:22 PM   MARTHA-7 Results   MARTHA 7 TOTAL SCORE 2 (minimal anxiety)   MARTHA-7 Total Score 2         9/8/2024     5:22 PM   PTSD Screen Score   Have you ever experienced this kind of event? Yes   PTSD Screen (Score of 3 or more suggests positive screen) 0         9/8/2024     5:27 PM   PROMIS-29   PROMIS Physical Function T-Score 39 (moderate dysfunction)   PROMIS Anxiety T-Score 54 (within normal limits)   PROMIS Depression T-Score 59 (mild)   PROMIS Fatigue T-Score 59 (mild)   PROMIS Sleep Disturbance T-Score 54 (within normal limits)   PROMIS Ability to Participate in Social Roles & Activities T-Score 36 (moderate dysfunction)   PROMIS Pain Interference T-Score 52 (within normal limits)   PROMIS Pain Intensity 3     No past medical history on file.    Past Surgical History:   Procedure Laterality Date    AS MYOMECTOMY 5/>,TOT>250 GMS,ABD  [facility]  02/11/2016    HC TOOTH EXTRACTION W/FORCEP  05/2009       Family History   Problem Relation Age of Onset    Hypertension Father     Alcoholism Father     Hepatitis Father         C    Cancer Maternal Grandfather     Hypothyroidism Paternal Grandmother     Cancer Paternal Grandfather     Hypothyroidism Paternal Aunt        Social History     Tobacco Use    Smoking status: Never    Smokeless tobacco: Never   Substance Use Topics    Alcohol use: Yes     Comment: 2 drinks or less per week    Drug use: Never         Current Outpatient Medications:     CALCIUM-VITAMIN D PO, Take by mouth daily , Disp: , Rfl:     cetirizine (ZYRTEC) 10 MG tablet, Take 10 mg by mouth daily, Disp: , Rfl:     levonorgestrel (MIRENA) 52 MG (20 mcg/day) IUD, 1 each by Intrauterine route, Disp: , Rfl:     melatonin 3 MG tablet, Take 2 mg by mouth nightly as needed for sleep, Disp: , Rfl:     Multiple Vitamin (MULTIVITAMIN PO), Take by mouth daily , Disp: , Rfl:     sertraline (ZOLOFT) 25 MG tablet, Take 1 tablet by mouth daily, Disp: , Rfl:     vitamin B-12 (CYANOCOBALAMIN) 1000 MCG tablet, Take 1 tablet by mouth daily, Disp: , Rfl:     Review of Systems   Constitutional, HEENT, cardiovascular, pulmonary, gi and gu systems are negative, except as otherwise noted.      Objective         Vitals:  No vitals were obtained today due to virtual visit.    Physical Exam   EYES: Eyes grossly normal to inspection.  No discharge or erythema, or obvious scleral/conjunctival abnormalities.  SKIN: Visible skin clear. No significant rash, abnormal pigmentation or lesions.  NEURO: Cranial nerves grossly intact.  Mentation and speech appropriate for age.  GENERAL: Healthy, alert and no distress  RESP: No audible wheeze, cough, or visible cyanosis.  No visible retractions or increased work of breathing.    PSYCH: Mentation appears normal, affect normal/bright, judgement and insight intact, normal speech and appearance well-groomed.    Labs :  TSH  Order:  [id number]  Component  Ref Range & Units 2 mo ago   TSH, Sensitive  0.30 - 4.50 uIU/mL 0.66   Resulting Agency [facility] CENTRAL LAB       Specimen Collected: 07/11/24  3:54 PM       Imaging:    I personally reviewed the following imaging results today and those on care everywhere, if indicated     Nothing new to review    Reviewed imaging from [facility] sites     Medical Records Reviewed:    Reviewed consults/documents from [facility] including  Family Practice,  and PT

## 2024-09-25 NOTE — PATIENT INSTRUCTIONS
Post COVID Self Care Suggestions:     Fatigue Management:       https://www.archives-pmr.org/action/showPdf?xid=-3702%2819%9437601-8       Self Care:      [email or web address]://[email or web address].med.[facility]/pain-care/self-care/  Recovery World Health Organization:    https://apps.who.int/iris/Redbeacontream/handle/85210/281982/CCS-XGRT-4718-944-31007-50559-eng.pdf  Breathing exercises:    https://www.[facility]/health/conditions-and-diseases/coronavirus/coronavirus-recovery-breathing-exercises      Assessment of sense of smell and taste    Smell training:  Flowery - Shu  Fruity - Lemon  Spicy - Cloves  Resinous - Eucalyptus      1 - Pour a few droplets of one of the oils on to a cotton pad or ball  2 - Do not try to sniff the pad immediately; leave it for a few minutes for the fragrance to develop  3 - Hold the first stick/pad up to your nose, about an inch away.  The order in which you test the oils does not matter  4 - Relax and try to inhale naturally through the nose - sniffing too quickly and deeply is likely to result in you not being able to detect anything  5 - Try this a couple more times, then rest for five minutes  6 - Move on to the next oil and repeat as above.    After three months switch to a new set of odors: menthol, thyme, tangerine, and shanice, training with them twice daily.    After another three months, switch to a third new set of odors: green tea, bergamot, rosemary, and jared, again training with them twice daily.    Studies:   [place] Paxlovid [place]  https://medicine.[employer].edu/cii/research/paxlc-study/?mibextid=Zxz2cZ    Cognitive Post-COVID study  z.[facility]/covid-ema    Supplements:  Fatigue- COQ10 100mg 3x day  ( side effects GI)  Brain fog-N-acetylcysteine 600 mg daily (side effects GI)       
No antibiotics or any antibiotics < 2 hrs prior to birth

## 2024-10-10 ENCOUNTER — TELEPHONE (OUTPATIENT)
Dept: PHYSICAL MEDICINE AND REHAB | Facility: CLINIC | Age: 34
End: 2024-10-10
Payer: COMMERCIAL

## 2024-10-10 NOTE — TELEPHONE ENCOUNTER
Left Voicemail (1st Attempt) and Sent Mychart (1st Attempt) for the patient to call back and schedule the following:    Appointment type: Return  Provider: Dr. Rodriguez  Return date: March 2025  Specialty phone number: [phone]  Additional appointment(s) needed:   Additonal Notes:

## 2024-10-14 ENCOUNTER — TELEPHONE (OUTPATIENT)
Dept: PHYSICAL MEDICINE AND REHAB | Facility: CLINIC | Age: 34
End: 2024-10-14
Payer: COMMERCIAL

## 2024-10-14 NOTE — TELEPHONE ENCOUNTER
Sent Mychart (1st Attempt) and Left Voicemail (2nd Attempt) for the patient to call back and schedule the following:    Appointment type: Return  Provider: Dr. Rodriguez  Return date: March 2025  Specialty phone number: [phone]  Additional appointment(s) needed:   Additonal Notes:

## 2025-06-12 NOTE — NURSING NOTE
Prior to immunization administration, verified patients identity using patient s name and date of birth. Please see Immunization Activity for additional information.     Screening Questionnaire for Adult Immunization    Are you sick today?   No   Do you have allergies to medications, food, a vaccine component or latex?   No   Have you ever had a serious reaction after receiving a vaccination?   No   Do you have a long-term health problem with heart, lung, kidney, or metabolic disease (e.g., diabetes), asthma, a blood disorder, no spleen, complement component deficiency, a cochlear implant, or a spinal fluid leak?  Are you on long-term aspirin therapy?   No   Do you have cancer, leukemia, HIV/AIDS, or any other immune system problem?   No   Do you have a parent, brother, or sister with an immune system problem?   No   In the past 3 months, have you taken medications that affect  your immune system, such as prednisone, other steroids, or anticancer drugs; drugs for the treatment of rheumatoid arthritis, Crohn s disease, or psoriasis; or have you had radiation treatments?   No   Have you had a seizure, or a brain or other nervous system problem?   No   During the past year, have you received a transfusion of blood or blood    products, or been given immune (gamma) globulin or antiviral drug?   No   For women: Are you pregnant or is there a chance you could become       pregnant during the next month?   No   Have you received any vaccinations in the past 4 weeks?   No     Immunization questionnaire answers were all negative.        Per orders of Sonya Mckenzie, injection of tdap given by Kay Toro RN. Patient instructed to remain in clinic for 15 minutes afterwards, and to report any adverse reaction to me immediately.       Screening performed by Kay Toro RN on 5/19/2022 at 4:08 PM.     Unit RN to OR RN

## 2025-07-14 SDOH — SOCIAL STABILITY: SOCIAL NETWORK: I HAVE TROUBLE DOING ALL OF MY USUAL WORK (INCLUDE WORK AT HOME): ALWAYS

## 2025-07-14 SDOH — SOCIAL STABILITY: SOCIAL NETWORK: I HAVE TROUBLE DOING ALL OF THE ACTIVITIES WITH FRIENDS THAT I WANT TO DO: ALWAYS

## 2025-07-14 SDOH — SOCIAL STABILITY: SOCIAL NETWORK: PROMIS ABILITY TO PARTICIPATE IN SOCIAL ROLES & ACTIVITIES T-SCORE: 29

## 2025-07-14 SDOH — SOCIAL STABILITY: SOCIAL NETWORK

## 2025-07-14 SDOH — SOCIAL STABILITY: SOCIAL NETWORK: I HAVE TROUBLE DOING ALL OF THE FAMILY ACTIVITIES THAT I WANT TO DO: ALWAYS

## 2025-07-14 SDOH — SOCIAL STABILITY: SOCIAL NETWORK: I HAVE TROUBLE DOING ALL OF MY REGULAR LEISURE ACTIVITIES WITH OTHERS: ALWAYS

## 2025-07-14 ASSESSMENT — ANXIETY QUESTIONNAIRES
5. BEING SO RESTLESS THAT IT IS HARD TO SIT STILL: NOT AT ALL
GAD7 TOTAL SCORE: 5
GAD7 TOTAL SCORE: 5
7. FEELING AFRAID AS IF SOMETHING AWFUL MIGHT HAPPEN: SEVERAL DAYS
GAD7 TOTAL SCORE: 5
2. NOT BEING ABLE TO STOP OR CONTROL WORRYING: NOT AT ALL
4. TROUBLE RELAXING: SEVERAL DAYS
IF YOU CHECKED OFF ANY PROBLEMS ON THIS QUESTIONNAIRE, HOW DIFFICULT HAVE THESE PROBLEMS MADE IT FOR YOU TO DO YOUR WORK, TAKE CARE OF THINGS AT HOME, OR GET ALONG WITH OTHER PEOPLE: SOMEWHAT DIFFICULT
7. FEELING AFRAID AS IF SOMETHING AWFUL MIGHT HAPPEN: SEVERAL DAYS
8. IF YOU CHECKED OFF ANY PROBLEMS, HOW DIFFICULT HAVE THESE MADE IT FOR YOU TO DO YOUR WORK, TAKE CARE OF THINGS AT HOME, OR GET ALONG WITH OTHER PEOPLE?: SOMEWHAT DIFFICULT
3. WORRYING TOO MUCH ABOUT DIFFERENT THINGS: NOT AT ALL
1. FEELING NERVOUS, ANXIOUS, OR ON EDGE: SEVERAL DAYS
6. BECOMING EASILY ANNOYED OR IRRITABLE: MORE THAN HALF THE DAYS

## 2025-07-14 ASSESSMENT — PATIENT HEALTH QUESTIONNAIRE - PHQ9
SUM OF ALL RESPONSES TO PHQ QUESTIONS 1-9: 7
SUM OF ALL RESPONSES TO PHQ QUESTIONS 1-9: 7
10. IF YOU CHECKED OFF ANY PROBLEMS, HOW DIFFICULT HAVE THESE PROBLEMS MADE IT FOR YOU TO DO YOUR WORK, TAKE CARE OF THINGS AT HOME, OR GET ALONG WITH OTHER PEOPLE: SOMEWHAT DIFFICULT

## 2025-07-15 ENCOUNTER — TELEPHONE (OUTPATIENT)
Dept: PHYSICAL MEDICINE AND REHAB | Facility: CLINIC | Age: 35
End: 2025-07-15

## 2025-07-15 ENCOUNTER — VIRTUAL VISIT (OUTPATIENT)
Dept: PHYSICAL MEDICINE AND REHAB | Facility: CLINIC | Age: 35
End: 2025-07-15
Payer: COMMERCIAL

## 2025-07-15 ENCOUNTER — MYC MEDICAL ADVICE (OUTPATIENT)
Dept: PHYSICAL MEDICINE AND REHAB | Facility: CLINIC | Age: 35
End: 2025-07-15

## 2025-07-15 DIAGNOSIS — R41.840 POST-COVID CHRONIC CONCENTRATION DEFICIT: ICD-10-CM

## 2025-07-15 DIAGNOSIS — R10.2 PELVIC PAIN IN FEMALE: ICD-10-CM

## 2025-07-15 DIAGNOSIS — G93.32 POST-COVID CHRONIC FATIGUE: Primary | ICD-10-CM

## 2025-07-15 DIAGNOSIS — U09.9 POST-COVID CHRONIC FATIGUE: Primary | ICD-10-CM

## 2025-07-15 DIAGNOSIS — N94.10 DYSPAREUNIA, FEMALE: ICD-10-CM

## 2025-07-15 DIAGNOSIS — U09.9 POST-COVID CHRONIC CONCENTRATION DEFICIT: ICD-10-CM

## 2025-07-15 DIAGNOSIS — H53.8 BLURRED VISION: ICD-10-CM

## 2025-07-15 ASSESSMENT — PAIN SCALES - GENERAL: PAINLEVEL_OUTOF10: NO PAIN (0)

## 2025-07-15 NOTE — NURSING NOTE
Current patient location: [address] [place] [id number]    Is the patient currently in the state of [place]? YES    Visit mode: VIDEO    If the visit is dropped, the patient can be reconnected by:VIDEO VISIT: Send to e-mail at: [email or web address]    Will anyone else be joining the visit? NO  (If patient encounters technical issues they should call [phone]    Are changes needed to the allergy or medication list? No    Are refills needed on medications prescribed by this physician? NO    Rooming Documentation:  Questionnaire(s) completed    Reason for visit: Follow Up    Ana BATRES

## 2025-07-15 NOTE — PROGRESS NOTES
Corrina Beasley is a 34 year old female who presents to be evaluated for a billable video visit.    Video-Visit Details    Video visit Start time:7:56 AM    Type of service:  Video Visit    Video End Time:8:12 AM    Originating Location (pt. Location): Home    Distant Location (provider location):  Off- Site    Platform used for Video Visit: [facility]    Assessment/Impression   1. Post-COVID chronic fatigue/Post-COVID chronic concentration deficit/ Pelvic pain/ Dyspareunia  Patient with slowly improving fatigue and concentration difficulties.  Discussed energy conservation and provided information on fatigue management.   Patient is continuing to practice techniques by occupational therapy.  Discussed she likely has mild post viral syndrome/fatigue after virus in April.  Discussed due to her pelvic pain this is likely contributing to her fatigue.  Will start LDN at 1mg and discussed side effects with patient.   - Ob/Gyn  Referral; Future  - COMPOUNDED NON-CONTROLLED SUBSTANCE (CMPD RX) - PHARMACY TO MIX COMPOUNDED MEDICATION; Take 1 mg tablet  of Low dose naltrexone daily.  Dispense: 30 tablet; Refill: 4    2. Blurred vision  Patient with improving  blurred vision after getting glasses.    Encouraged to continue working with occupational therapy for her vision therapy.  She follows up with Neuro-optometry in August and encouraged to keep appointment.       3. Long COVID  Discussed COVID and Post COVID with patient.  Educational materials provided and all questions answered.       Plan:  I reviewed present knowledge on long-Covid.  Education was provided and question were answered.  Orders/Referrals as above  I will advised patient on test results  I will follow up with Corrina Beasley in 3 months. I will review progress and consider need for any other therapeutic interventions. If there are any questions and/or concerns she will call the clinic.      On day of encounter time spent in chart review and with  patient in consultation, exam, education,coordination of care,  review of outside charts/documentation and documentation:  25 minutes     I have attempted to proof read for major spelling errors and apologize for any minor errors I may have missed.     This note was dictated using voice recognition software. Any grammatical or context distortions are unintentional and inherent to the software.  _________________________________  Jennifer Rodriguez PA-C  [facility] PHYSICAL MEDICINE AND REHABILITATION CLINIC [facility]   Patient returns for a follow up.  Patient was last seen 9/2024. She had fatigue and dizziness return in April.  She does endorse only have a sore throat at this time but no other symptoms. She is working 5x a week and is tolerating this ok right now.  Patient feels her blurred vision is improving.  She still has off days with her balance.  Feel she is better then she was in may with her flare.    Overall improving but still with some imbalance, fatigue, and concentration difficulties.      Current concerns: Health Concerns      7/14/2025     9:35 PM   PHQ Assesment Total Score(s)   PHQ-9 Score 7        Patient-reported           7/14/2025     9:35 PM   MARTHA-7 Results   MARTHA 7 TOTAL SCORE 5 (mild anxiety)   MARTHA-7 Total Score 5        Patient-reported         7/14/2025     9:36 PM   PTSD Screen Score   Have you ever experienced this kind of event? Yes   PTSD Screen (Score of 3 or more suggests positive screen) 0        Patient-reported         7/14/2025     9:39 PM   PROMIS-29   PROMIS Physical Function T-Score 40 (mild dysfunction)   PROMIS Anxiety T-Score 51 (within normal limits)   PROMIS Depression T-Score 59 (mild)   PROMIS Fatigue T-Score 65 (moderate)   PROMIS Sleep Disturbance T-Score 48 (within normal limits)   PROMIS Ability to Participate in Social Roles & Activities T-Score 29 (severe dysfunction)   PROMIS Pain Interference T-Score 42 (within normal limits)    PROMIS Pain Intensity 3     No past medical history on file.    Past Surgical History:   Procedure Laterality Date    AS MYOMECTOMY 5/>,TOT>250 GMS,ABD APPRCH  02/11/2016    HC TOOTH EXTRACTION W/FORCEP  05/2009       Family History   Problem Relation Age of Onset    Hypertension Father     Alcoholism Father     Hepatitis Father         C    Cancer Maternal Grandfather     Hypothyroidism Paternal Grandmother     Cancer Paternal Grandfather     Hypothyroidism Paternal Aunt        Social History     Tobacco Use    Smoking status: Never    Smokeless tobacco: Never   Substance Use Topics    Alcohol use: Yes     Comment: 2 drinks or less per week    Drug use: Never         Current Outpatient Medications:     CALCIUM-VITAMIN D PO, Take by mouth daily , Disp: , Rfl:     cetirizine (ZYRTEC) 10 MG tablet, Take 10 mg by mouth daily, Disp: , Rfl:     levonorgestrel (MIRENA) 52 MG (20 mcg/day) IUD, 1 each by Intrauterine route, Disp: , Rfl:     melatonin 3 MG tablet, Take 2 mg by mouth nightly as needed for sleep, Disp: , Rfl:     Multiple Vitamin (MULTIVITAMIN PO), Take by mouth daily , Disp: , Rfl:     vitamin B-12 (CYANOCOBALAMIN) 1000 MCG tablet, Take 1 tablet by mouth daily, Disp: , Rfl:     Review of Systems   Constitutional, HEENT, cardiovascular, pulmonary, gi and gu systems are negative, except as otherwise noted.      Objective         Vitals:  No vitals were obtained today due to virtual visit.    Physical Exam   GENERAL: Healthy, alert and no distress  EYES: Eyes grossly normal to inspection.  No discharge or erythema, or obvious scleral/conjunctival abnormalities.  NEURO: Cranial nerves grossly intact.  Mentation and speech appropriate for age.  RESP: No audible wheeze, cough, or visible cyanosis.  No visible retractions or increased work of breathing.    PSYCH: Mentation appears normal, affect normal/bright, judgement and insight intact, normal speech and appearance well-groomed.  SKIN: Visible skin clear. No  significant rash, abnormal pigmentation or lesions.    Labs :  TSH  Order: [id number]  Component  Ref Range & Units 2 mo ago   TSH, Sensitive  0.30 - 4.50 uIU/mL 1.47   Resulting Agency [facility]     Specimen Collected: 04/24/25  4:08 PM       Imaging:    I personally reviewed the following imaging results today and those on care everywhere, if indicated     Nothing new to review    Reviewed imaging from [facility] sites     Medical Records Reviewed:    Reviewed consults/documents from [facility] including Family Practice,  and PT

## 2025-07-15 NOTE — TELEPHONE ENCOUNTER
Writer called and notified patient via voicemail message that the low dose naltrexone prescription cannot be filled at the [facility], so Jennifer Rodriguez PA-C, sent this to [facility], who will be reaching out to patient to coordinate sending the prescription to her. Writer will also notify her via [employer] message.    Elizabeth Argueta RN on 7/15/2025 at 1:48 PM

## 2025-07-15 NOTE — LETTER
7/15/2025       RE: Corrina Beasley  [address] [address]     Dear Colleague,    Thank you for referring your patient, Corrina Beasley, to the [facility] PHYSICAL MEDICINE AND REHABILITATION CLINIC [place] [facility]. Please see a copy of my visit note below.    Corrina Beasley is a 34 year old female who presents to be evaluated for a billable video visit.    Video-Visit Details    Video visit Start time:7:56 AM    Type of service:  Video Visit    Video End Time:8:12 AM    Originating Location (pt. Location): Home    Distant Location (provider location):  Off- Site    Platform used for Video Visit: [facility]    Assessment/Impression   1. Post-COVID chronic fatigue/Post-COVID chronic concentration deficit/ Pelvic pain/ Dyspareunia  Patient with slowly improving fatigue and concentration difficulties.  Discussed energy conservation and provided information on fatigue management.   Patient is continuing to practice techniques by occupational therapy.  Discussed she likely has mild post viral syndrome/fatigue after virus in April.  Discussed due to her pelvic pain this is likely contributing to her fatigue.  Will start LDN at 1mg and discussed side effects with patient.   - Ob/Gyn  Referral; Future  - COMPOUNDED NON-CONTROLLED SUBSTANCE (CMPD RX) - PHARMACY TO MIX COMPOUNDED MEDICATION; Take 1 mg tablet  of Low dose naltrexone daily.  Dispense: 30 tablet; Refill: 4    2. Blurred vision  Patient with improving  blurred vision after getting glasses.    Encouraged to continue working with occupational therapy for her vision therapy.  She follows up with Neuro-optometry in August and encouraged to keep appointment.       3. Long COVID  Discussed COVID and Post COVID with patient.  Educational materials provided and all questions answered.       Plan:  I reviewed present knowledge on long-Covid.  Education was provided and question were  answered.  Orders/Referrals as above  I will advised patient on test results  I will follow up with Corrina Beasley in 3 months. I will review progress and consider need for any other therapeutic interventions. If there are any questions and/or concerns she will call the clinic.      On day of encounter time spent in chart review and with patient in consultation, exam, education,coordination of care,  review of outside charts/documentation and documentation:  25 minutes     I have attempted to proof read for major spelling errors and apologize for any minor errors I may have missed.     This note was dictated using voice recognition software. Any grammatical or context distortions are unintentional and inherent to the software.  _________________________________  Jennifer Rodriguez PA-C  [facility] PHYSICAL MEDICINE AND REHABILITATION CLINIC [facility]   Patient returns for a follow up.  Patient was last seen 9/2024. She had fatigue and dizziness return in April.  She does endorse only have a sore throat at this time but no other symptoms. She is working 5x a week and is tolerating this ok right now.  Patient feels her blurred vision is improving.  She still has off days with her balance.  Feel she is better then she was in may with her flare.    Overall improving but still with some imbalance, fatigue, and concentration difficulties.      Current concerns: Health Concerns      7/14/2025     9:35 PM   PHQ Assesment Total Score(s)   PHQ-9 Score 7        Patient-reported           7/14/2025     9:35 PM   MARTHA-7 Results   MARTHA 7 TOTAL SCORE 5 (mild anxiety)   MARTHA-7 Total Score 5        Patient-reported         7/14/2025     9:36 PM   PTSD Screen Score   Have you ever experienced this kind of event? Yes   PTSD Screen (Score of 3 or more suggests positive screen) 0        Patient-reported         7/14/2025     9:39 PM   PROMIS-29   PROMIS Physical Function T-Score 40 (mild dysfunction)   PROMIS  Anxiety T-Score 51 (within normal limits)   PROMIS Depression T-Score 59 (mild)   PROMIS Fatigue T-Score 65 (moderate)   PROMIS Sleep Disturbance T-Score 48 (within normal limits)   PROMIS Ability to Participate in Social Roles & Activities T-Score 29 (severe dysfunction)   PROMIS Pain Interference T-Score 42 (within normal limits)   PROMIS Pain Intensity 3     No past medical history on file.    Past Surgical History:   Procedure Laterality Date     AS MYOMECTOMY 5/>,TOT>250 GMS,ABD APPRCH  02/11/2016     HC TOOTH EXTRACTION W/FORCEP  05/2009       Family History   Problem Relation Age of Onset     Hypertension Father      Alcoholism Father      Hepatitis Father         C     Cancer Maternal Grandfather      Hypothyroidism Paternal Grandmother      Cancer Paternal Grandfather      Hypothyroidism Paternal Aunt        Social History     Tobacco Use     Smoking status: Never     Smokeless tobacco: Never   Substance Use Topics     Alcohol use: Yes     Comment: 2 drinks or less per week     Drug use: Never         Current Outpatient Medications:      CALCIUM-VITAMIN D PO, Take by mouth daily , Disp: , Rfl:      cetirizine (ZYRTEC) 10 MG tablet, Take 10 mg by mouth daily, Disp: , Rfl:      levonorgestrel (MIRENA) 52 MG (20 mcg/day) IUD, 1 each by Intrauterine route, Disp: , Rfl:      melatonin 3 MG tablet, Take 2 mg by mouth nightly as needed for sleep, Disp: , Rfl:      Multiple Vitamin (MULTIVITAMIN PO), Take by mouth daily , Disp: , Rfl:      vitamin B-12 (CYANOCOBALAMIN) 1000 MCG tablet, Take 1 tablet by mouth daily, Disp: , Rfl:     Review of Systems   Constitutional, HEENT, cardiovascular, pulmonary, gi and gu systems are negative, except as otherwise noted.      Objective         Vitals:  No vitals were obtained today due to virtual visit.    Physical Exam   GENERAL: Healthy, alert and no distress  EYES: Eyes grossly normal to inspection.  No discharge or erythema, or obvious scleral/conjunctival  abnormalities.  NEURO: Cranial nerves grossly intact.  Mentation and speech appropriate for age.  RESP: No audible wheeze, cough, or visible cyanosis.  No visible retractions or increased work of breathing.    PSYCH: Mentation appears normal, affect normal/bright, judgement and insight intact, normal speech and appearance well-groomed.  SKIN: Visible skin clear. No significant rash, abnormal pigmentation or lesions.    Labs :  TSH  Order: [id number]  Component  Ref Range & Units 2 mo ago   TSH, Sensitive  0.30 - 4.50 uIU/mL 1.47   Resulting Agency [facility]     Specimen Collected: 04/24/25  4:08 PM       Imaging:    I personally reviewed the following imaging results today and those on care everywhere, if indicated     Nothing new to review    Reviewed imaging from [facility] sites     Medical Records Reviewed:    Reviewed consults/documents from [facility] including Family Practice,  and PT       Again, thank you for allowing me to participate in the care of your patient.      Sincerely,    Jennifer Rodriguez PA-C

## 2025-07-15 NOTE — TELEPHONE ENCOUNTER
Health Call Center    Phone Message    May a detailed message be left on voicemail: yes     Reason for Call: Medication Question or concern regarding medication   Prescription Clarification  Name of Medication: COMPOUNDED NON-CONTROLLED SUBSTANCE (CMPD RX) - PHARMACY TO MIX COMPOUNDED MEDICATION   Prescribing Provider: Jennifer Rodriguez   Pharmacy: [facility]   What on the order needs clarification? Stone from Pharmacy called to let us know that they are not able to compound this medication and wants to let us know this needs to be sent to a different pharmacy.

## 2025-07-16 ENCOUNTER — PATIENT OUTREACH (OUTPATIENT)
Dept: CARE COORDINATION | Facility: CLINIC | Age: 35
End: 2025-07-16
Payer: COMMERCIAL